# Patient Record
Sex: FEMALE | Race: WHITE | NOT HISPANIC OR LATINO | Employment: OTHER | URBAN - METROPOLITAN AREA
[De-identification: names, ages, dates, MRNs, and addresses within clinical notes are randomized per-mention and may not be internally consistent; named-entity substitution may affect disease eponyms.]

---

## 2017-08-07 ENCOUNTER — HOSPITAL ENCOUNTER (EMERGENCY)
Facility: HOSPITAL | Age: 82
Discharge: HOME | End: 2017-08-07
Attending: EMERGENCY MEDICINE | Admitting: EMERGENCY MEDICINE

## 2017-08-07 ENCOUNTER — APPOINTMENT (OUTPATIENT)
Dept: GENERAL RADIOLOGY | Facility: HOSPITAL | Age: 82
End: 2017-08-07

## 2017-08-07 ENCOUNTER — APPOINTMENT (OUTPATIENT)
Dept: CARDIOLOGY | Facility: HOSPITAL | Age: 82
End: 2017-08-07
Attending: EMERGENCY MEDICINE

## 2017-08-07 VITALS
RESPIRATION RATE: 16 BRPM | DIASTOLIC BLOOD PRESSURE: 74 MMHG | TEMPERATURE: 98.7 F | WEIGHT: 115 LBS | OXYGEN SATURATION: 95 % | SYSTOLIC BLOOD PRESSURE: 179 MMHG | BODY MASS INDEX: 21.71 KG/M2 | HEIGHT: 61 IN | HEART RATE: 44 BPM

## 2017-08-07 DIAGNOSIS — G89.29 CHRONIC PAIN OF BOTH KNEES: ICD-10-CM

## 2017-08-07 DIAGNOSIS — I49.1 PAC (PREMATURE ATRIAL CONTRACTION): ICD-10-CM

## 2017-08-07 DIAGNOSIS — M25.562 CHRONIC PAIN OF BOTH KNEES: ICD-10-CM

## 2017-08-07 DIAGNOSIS — R00.2 PALPITATIONS: Primary | ICD-10-CM

## 2017-08-07 DIAGNOSIS — M25.561 CHRONIC PAIN OF BOTH KNEES: ICD-10-CM

## 2017-08-07 DIAGNOSIS — I10 ESSENTIAL HYPERTENSION: ICD-10-CM

## 2017-08-07 LAB
ALBUMIN SERPL-MCNC: 4 G/DL (ref 3.5–5.2)
ALBUMIN/GLOB SERPL: 1.1 G/DL
ALP SERPL-CCNC: 41 U/L (ref 39–117)
ALT SERPL W P-5'-P-CCNC: 15 U/L (ref 1–33)
ANION GAP SERPL CALCULATED.3IONS-SCNC: 15.6 MMOL/L
AST SERPL-CCNC: 19 U/L (ref 1–32)
BASOPHILS # BLD AUTO: 0.05 10*3/MM3 (ref 0–0.2)
BASOPHILS NFR BLD AUTO: 0.5 % (ref 0–1.5)
BILIRUB SERPL-MCNC: 0.4 MG/DL (ref 0.1–1.2)
BUN BLD-MCNC: 15 MG/DL (ref 8–23)
BUN/CREAT SERPL: 30.6 (ref 7–25)
CALCIUM SPEC-SCNC: 9.4 MG/DL (ref 8.6–10.5)
CHLORIDE SERPL-SCNC: 102 MMOL/L (ref 98–107)
CO2 SERPL-SCNC: 23.4 MMOL/L (ref 22–29)
CREAT BLD-MCNC: 0.49 MG/DL (ref 0.57–1)
DEPRECATED RDW RBC AUTO: 52.8 FL (ref 37–54)
EOSINOPHIL # BLD AUTO: 0.07 10*3/MM3 (ref 0–0.7)
EOSINOPHIL NFR BLD AUTO: 0.7 % (ref 0.3–6.2)
ERYTHROCYTE [DISTWIDTH] IN BLOOD BY AUTOMATED COUNT: 14.6 % (ref 11.7–13)
GFR SERPL CREATININE-BSD FRML MDRD: 120 ML/MIN/1.73
GLOBULIN UR ELPH-MCNC: 3.6 GM/DL
GLUCOSE BLD-MCNC: 115 MG/DL (ref 65–99)
HCT VFR BLD AUTO: 43.7 % (ref 35.6–45.5)
HGB BLD-MCNC: 14.3 G/DL (ref 11.9–15.5)
HOLD SPECIMEN: NORMAL
HOLD SPECIMEN: NORMAL
IMM GRANULOCYTES # BLD: 0.03 10*3/MM3 (ref 0–0.03)
IMM GRANULOCYTES NFR BLD: 0.3 % (ref 0–0.5)
LYMPHOCYTES # BLD AUTO: 1.01 10*3/MM3 (ref 0.9–4.8)
LYMPHOCYTES NFR BLD AUTO: 10.7 % (ref 19.6–45.3)
MCH RBC QN AUTO: 32.1 PG (ref 26.9–32)
MCHC RBC AUTO-ENTMCNC: 32.7 G/DL (ref 32.4–36.3)
MCV RBC AUTO: 98.2 FL (ref 80.5–98.2)
MONOCYTES # BLD AUTO: 0.66 10*3/MM3 (ref 0.2–1.2)
MONOCYTES NFR BLD AUTO: 7 % (ref 5–12)
NEUTROPHILS # BLD AUTO: 7.6 10*3/MM3 (ref 1.9–8.1)
NEUTROPHILS NFR BLD AUTO: 80.8 % (ref 42.7–76)
PLATELET # BLD AUTO: 229 10*3/MM3 (ref 140–500)
PMV BLD AUTO: 11.1 FL (ref 6–12)
POTASSIUM BLD-SCNC: 4 MMOL/L (ref 3.5–5.2)
PROT SERPL-MCNC: 7.6 G/DL (ref 6–8.5)
RBC # BLD AUTO: 4.45 10*6/MM3 (ref 3.9–5.2)
SODIUM BLD-SCNC: 141 MMOL/L (ref 136–145)
TROPONIN T SERPL-MCNC: <0.01 NG/ML (ref 0–0.03)
WBC NRBC COR # BLD: 9.42 10*3/MM3 (ref 4.5–10.7)
WHOLE BLOOD HOLD SPECIMEN: NORMAL
WHOLE BLOOD HOLD SPECIMEN: NORMAL

## 2017-08-07 PROCEDURE — 80053 COMPREHEN METABOLIC PANEL: CPT

## 2017-08-07 PROCEDURE — 36415 COLL VENOUS BLD VENIPUNCTURE: CPT

## 2017-08-07 PROCEDURE — 0296T HC EXT ECG > 48HR TO 21 DAY RCRD W/CONECT INTL RCRD: CPT

## 2017-08-07 PROCEDURE — 93005 ELECTROCARDIOGRAM TRACING: CPT

## 2017-08-07 PROCEDURE — 71020 HC CHEST PA AND LATERAL: CPT

## 2017-08-07 PROCEDURE — 85025 COMPLETE CBC W/AUTO DIFF WBC: CPT

## 2017-08-07 PROCEDURE — 93005 ELECTROCARDIOGRAM TRACING: CPT | Performed by: EMERGENCY MEDICINE

## 2017-08-07 PROCEDURE — 99284 EMERGENCY DEPT VISIT MOD MDM: CPT

## 2017-08-07 PROCEDURE — 93010 ELECTROCARDIOGRAM REPORT: CPT | Performed by: INTERNAL MEDICINE

## 2017-08-07 PROCEDURE — 84484 ASSAY OF TROPONIN QUANT: CPT

## 2017-08-07 RX ORDER — LOSARTAN POTASSIUM 50 MG/1
50 TABLET ORAL 2 TIMES DAILY
Status: ON HOLD | COMMUNITY
End: 2020-01-01

## 2017-08-07 RX ORDER — ASPIRIN 325 MG
325 TABLET ORAL ONCE
Status: DISCONTINUED | OUTPATIENT
Start: 2017-08-07 | End: 2017-08-07 | Stop reason: HOSPADM

## 2017-08-07 RX ORDER — SODIUM CHLORIDE 0.9 % (FLUSH) 0.9 %
10 SYRINGE (ML) INJECTION AS NEEDED
Status: DISCONTINUED | OUTPATIENT
Start: 2017-08-07 | End: 2017-08-07 | Stop reason: HOSPADM

## 2017-08-07 RX ORDER — CLONIDINE HYDROCHLORIDE 0.1 MG/1
0.2 TABLET ORAL ONCE
Status: COMPLETED | OUTPATIENT
Start: 2017-08-07 | End: 2017-08-07

## 2017-08-07 RX ADMIN — CLONIDINE HYDROCHLORIDE 0.2 MG: 0.1 TABLET ORAL at 20:55

## 2017-08-07 NOTE — ED PROVIDER NOTES
EMERGENCY DEPARTMENT ENCOUNTER    CHIEF COMPLAINT  Chief Complaint: palpitations   History given by: patient   History limited by: nothing   Room Number: 22/22  PMD: Arian Moyer MD    HPI:  Pt is a 84 y.o. female who presents to be evaluated for A-fib. Pt saw her rheumatologist today because she has had bilateral knee pain for 1 week. Her rheumatologist thought pt was in A-fib when she listened to her heart, so she recommended further evaluation. Pt has complained of fatigue and intermittent palpitations for 1 year. Pt denies SOA and any other sx. Pt sees a cardiologist for palpitations, but she has never been diagnosed with A-fib.     Duration:  Intermittent palpitations for 1 year   Onset: abnormal rhythm heard earlier today  Timing: intermittent   Location: heart, legs   Radiation: does not radiate   Intensity/Severity: moderate   Progression: unable to specify  Associated Symptoms: knee pain, fatigue   Aggravating Factors: none specified   Alleviating Factors: none specified   Previous Episodes: Pt states she has had episodes for 1 year but has never been diagnosed   Treatment before arrival: none     PAST MEDICAL HISTORY  Active Ambulatory Problems     Diagnosis Date Noted   • No Active Ambulatory Problems     Resolved Ambulatory Problems     Diagnosis Date Noted   • No Resolved Ambulatory Problems     Past Medical History:   Diagnosis Date   • Arthritis    • Disease of thyroid gland    • Hypertension    • Osteoporosis        PAST SURGICAL HISTORY  Past Surgical History:   Procedure Laterality Date   • COLONOSCOPY         FAMILY HISTORY  History reviewed. No pertinent family history.    SOCIAL HISTORY  Social History     Social History   • Marital status:      Spouse name: N/A   • Number of children: N/A   • Years of education: N/A     Occupational History   • Not on file.     Social History Main Topics   • Smoking status: Never Smoker   • Smokeless tobacco: Not on file   • Alcohol use No    • Drug use: No   • Sexual activity: Not on file     Other Topics Concern   • Not on file     Social History Narrative   • No narrative on file       ALLERGIES  Marigold  [calendula officinalis (marigold)]; Sulfa antibiotics; and Sulfites    REVIEW OF SYSTEMS  Review of Systems   Constitutional: Positive for fatigue. Negative for fever.   HENT: Negative for sore throat.    Eyes: Negative.    Respiratory: Negative for cough and shortness of breath.    Cardiovascular: Positive for palpitations. Negative for chest pain.   Gastrointestinal: Negative for abdominal pain, diarrhea and vomiting.   Genitourinary: Negative for dysuria.   Musculoskeletal: Negative for neck pain.        Bilateral knee pain   Skin: Negative for rash.   Allergic/Immunologic: Negative.    Neurological: Negative for weakness, numbness and headaches.   Hematological: Negative.    Psychiatric/Behavioral: Negative.    All other systems reviewed and are negative.      PHYSICAL EXAM  ED Triage Vitals   Temp Heart Rate Resp BP SpO2   08/07/17 1721 08/07/17 1721 08/07/17 1721 08/07/17 1730 08/07/17 1721   98.7 °F (37.1 °C) 78 18 168/99 96 %      Temp src Heart Rate Source Patient Position BP Location FiO2 (%)   08/07/17 1721 08/07/17 1721 08/07/17 1730 -- --   Tympanic Monitor Sitting         Physical Exam   Constitutional: She is oriented to person, place, and time and well-developed, well-nourished, and in no distress. No distress.   HENT:   Head: Normocephalic and atraumatic.   Eyes: EOM are normal. Pupils are equal, round, and reactive to light.   Neck: Normal range of motion. Neck supple.   Cardiovascular: Normal rate, regular rhythm and normal heart sounds.    Pulmonary/Chest: Effort normal and breath sounds normal. No respiratory distress.   Abdominal: Soft. There is no tenderness. There is no rebound and no guarding.   Musculoskeletal: Normal range of motion. She exhibits no edema.        Right knee: She exhibits no erythema. Tenderness (mild)  found.        Left knee: She exhibits no erythema. Tenderness (mild) found.   Neurological: She is alert and oriented to person, place, and time. She has normal sensation and normal strength.   Skin: Skin is warm and dry. No rash noted.   Psychiatric: Mood and affect normal.   Nursing note and vitals reviewed.      LAB RESULTS  Lab Results (last 24 hours)     Procedure Component Value Units Date/Time    CBC & Differential [[id number]] Collected:  08/07/17 1748    Specimen:  Blood Updated:  08/07/17 1819    Narrative:       The following orders were created for panel order CBC & Differential.  Procedure                               Abnormality         Status                     ---------                               -----------         ------                     CBC Auto Differential[[id number]]        Abnormal            Final result                 Please view results for these tests on the individual orders.    Comprehensive Metabolic Panel [[id number]]  (Abnormal) Collected:  08/07/17 1748    Specimen:  Blood Updated:  08/07/17 1853     Glucose 115 (H) mg/dL      BUN 15 mg/dL      Creatinine 0.49 (L) mg/dL      Sodium 141 mmol/L      Potassium 4.0 mmol/L      Chloride 102 mmol/L      CO2 23.4 mmol/L      Calcium 9.4 mg/dL      Total Protein 7.6 g/dL      Albumin 4.00 g/dL      ALT (SGPT) 15 U/L      AST (SGOT) 19 U/L      Alkaline Phosphatase 41 U/L      Total Bilirubin 0.4 mg/dL      eGFR Non African Amer 120 mL/min/1.73      Globulin 3.6 gm/dL      A/G Ratio 1.1 g/dL      BUN/Creatinine Ratio 30.6 (H)     Anion Gap 15.6 mmol/L     Narrative:       The MDRD GFR formula is only valid for adults with stable renal function between ages 18 and 70.    Troponin [[id number]]  (Normal) Collected:  08/07/17 1748    Specimen:  Blood Updated:  08/07/17 1853     Troponin T <0.010 ng/mL     Narrative:       Troponin T Reference Ranges:  Less than 0.03 ng/mL:    Negative for AMI  0.03 to 0.09 ng/mL:      Indeterminant for  AMI  Greater than 0.09 ng/mL: Positive for AMI    CBC Auto Differential [[id number]]  (Abnormal) Collected:  08/07/17 1748    Specimen:  Blood Updated:  08/07/17 1819     WBC 9.42 10*3/mm3      RBC 4.45 10*6/mm3      Hemoglobin 14.3 g/dL      Hematocrit 43.7 %      MCV 98.2 fL      MCH 32.1 (H) pg      MCHC 32.7 g/dL      RDW 14.6 (H) %      RDW-SD 52.8 fl      MPV 11.1 fL      Platelets 229 10*3/mm3      Neutrophil % 80.8 (H) %      Lymphocyte % 10.7 (L) %      Monocyte % 7.0 %      Eosinophil % 0.7 %      Basophil % 0.5 %      Immature Grans % 0.3 %      Neutrophils, Absolute 7.60 10*3/mm3      Lymphocytes, Absolute 1.01 10*3/mm3      Monocytes, Absolute 0.66 10*3/mm3      Eosinophils, Absolute 0.07 10*3/mm3      Basophils, Absolute 0.05 10*3/mm3      Immature Grans, Absolute 0.03 10*3/mm3           I ordered the above labs and reviewed the results    RADIOLOGY  XR Chest 2 View   Final Result       CXR:  1. Stable negative acute chest.  2. Scoliosis, spondylosis, mild chronic pulmonary change.      I ordered the above noted radiological studies. Interpreted by radiologist.  Reviewed by me in PACS.       PROCEDURES  Procedures  EKG           EKG time: 1727  Rhythm/Rate: SR at 63 with PACs  P waves and MT: normal  QRS, axis: LAD, LBBB, LVH, anterior Q waves   ST and T waves: nonspecific T wave changes      Interpreted Contemporaneously by me, independently viewed  Unchanged compared to prior 03/11/15    EKG           EKG time: 1934  Rhythm/Rate: Sinus bradycardia at 56 with frequent PAC  P waves and MT: normal  QRS, axis: LAD, LBBB, LVH   ST and T waves: nonspecific T wave changes      Interpreted Contemporaneously by me, independently viewed  Rate slightly slower compared to prior        PROGRESS AND CONSULTS  ED Course     1728: CXR, EKG, and labs were ordered in triage.     1920: Ordered repeat EKG to evaluate for A-fib.     1947: Ordered ZIO patch placement.     2036: Rechecked pt. Pt is resting comfortably and  ZIO patch has been placed. I had ordered Clonidine here, but she refuses because she had not taken her afternoon dose of Losartan or Amlodipine. She plans to take these when she goes home. Discussed unremarkable workup and explained that pt has not been in A-fib while in ED. Pt will f/u with cardiologist regarding ZIO patch results. Pt understands and agrees with plan for discharge and all questions were addressed.     MEDICAL DECISION MAKING  Results were reviewed/discussed with the patient and they were also made aware of online access. Pt also made aware that some labs, such as cultures, will not be resulted during ER visit and follow up with PMD is necessary.     MDM  Number of Diagnoses or Management Options  Chronic pain of both knees:   Essential hypertension:   PAC (premature atrial contraction):   Palpitations:   Diagnosis management comments: Patient was sent to the ER for evaluation for possible atrial fibrillation.  The patient did complain of palpitations but denied dizziness, chest pain, or shortness of breath.  Serial EKGs done in the ER showed sinus rhythm with frequent PACs.  Her labs were unremarkable.  A ZIO patch was placed in the ER.  The patient was advised to follow-up with her cardiologist.       Amount and/or Complexity of Data Reviewed  Clinical lab tests: reviewed and ordered  Tests in the radiology section of CPT®: reviewed and ordered  Tests in the medicine section of CPT®: reviewed and ordered (EKG time: 1727  Rhythm/Rate: SR at 63 with PACs  P waves and DE: normal  QRS, axis: LAD, LBBB, LVH, anterior Q waves   ST and T waves: nonspecific T wave changes      Interpreted Contemporaneously by me, independently viewed  Unchanged compared to prior 03/11/15)  Decide to obtain previous medical records or to obtain history from someone other than the patient: yes  Review and summarize past medical records: yes (Pt was last admitted here in March 2015 for nausea, vomiting, and possible  medication withdrawal. )    Patient Progress  Patient progress: stable         DIAGNOSIS  Final diagnoses:   Palpitations   Essential hypertension   Chronic pain of both knees   PAC (premature atrial contraction)       DISPOSITION  DISCHARGE    Patient discharged in stable condition.    Reviewed implications of results, diagnosis, meds, responsibility to follow up, warning signs and symptoms of possible worsening, potential complications and reasons to return to ER.    Patient/Family voiced understanding of above instructions.    Discussed plan for discharge, as there is no emergent indication for admission.  Pt/family is agreeable and understands need for follow up and repeat testing.  Pt is aware that discharge does not mean that nothing is wrong but it indicates no emergency is present that requires admission and they must continue care with follow-up as given below or physician of their choice.     FOLLOW-UP  Karri Rodriguez MD  [address] [address]  [phone]    Call in 1 week           Medication List      Notice     No changes were made to your prescriptions during this visit.        Latest Documented Vital Signs:  As of 8:43 PM  BP- (!) 196/110 HR- 65 Temp- 98.7 °F (37.1 °C) (Tympanic) O2 sat- 94%    --  Documentation assistance provided by maia Holland for Gerson Hensley.  Information recorded by the scribe was done at my direction and has been verified and validated by me.            Radha Holland  08/07/17 8110       Chavez Hensley MD  08/07/17 0068

## 2017-08-07 NOTE — ED NOTES
"MD Hensley at bedside for eval. Pt reports she was seen by rheumatologist today today for RA flare up and was told to come to ER because when she listened to pt, she \"heard Afib.\" pt did not have EKG done at office. Pt reports palpitations recently and has been following with cardiologist who did not dx Afib. Pt denies chest pain or palpitations at this time. Reassurance given; call light in reach. Pts breathing even and unlabored. Pt appears in NAD at this time. Family at bedside.        Shana Barney, RN  08/07/17 1914    "

## 2017-08-20 PROCEDURE — 0298T ZIO PATCH: CPT | Performed by: INTERNAL MEDICINE

## 2017-12-08 ENCOUNTER — TRANSCRIBE ORDERS (OUTPATIENT)
Dept: PHYSICAL THERAPY | Facility: HOSPITAL | Age: 82
End: 2017-12-08

## 2017-12-08 DIAGNOSIS — M41.9 SCOLIOSIS, UNSPECIFIED SCOLIOSIS TYPE, UNSPECIFIED SPINAL REGION: Primary | ICD-10-CM

## 2017-12-27 ENCOUNTER — APPOINTMENT (OUTPATIENT)
Dept: PHYSICAL THERAPY | Facility: HOSPITAL | Age: 82
End: 2017-12-27

## 2018-01-02 ENCOUNTER — APPOINTMENT (OUTPATIENT)
Dept: PHYSICAL THERAPY | Facility: HOSPITAL | Age: 83
End: 2018-01-02

## 2018-01-04 ENCOUNTER — APPOINTMENT (OUTPATIENT)
Dept: PHYSICAL THERAPY | Facility: HOSPITAL | Age: 83
End: 2018-01-04

## 2018-01-11 ENCOUNTER — APPOINTMENT (OUTPATIENT)
Dept: PHYSICAL THERAPY | Facility: HOSPITAL | Age: 83
End: 2018-01-11

## 2018-01-16 ENCOUNTER — APPOINTMENT (OUTPATIENT)
Dept: PHYSICAL THERAPY | Facility: HOSPITAL | Age: 83
End: 2018-01-16

## 2018-07-09 ENCOUNTER — HOSPITAL ENCOUNTER (EMERGENCY)
Facility: HOSPITAL | Age: 83
Discharge: HOME | End: 2018-07-09
Admitting: EMERGENCY MEDICINE

## 2018-07-09 VITALS
HEIGHT: 60 IN | DIASTOLIC BLOOD PRESSURE: 75 MMHG | BODY MASS INDEX: 21.01 KG/M2 | HEART RATE: 79 BPM | RESPIRATION RATE: 18 BRPM | SYSTOLIC BLOOD PRESSURE: 153 MMHG | WEIGHT: 107 LBS | OXYGEN SATURATION: 94 % | TEMPERATURE: 98.2 F

## 2018-07-09 DIAGNOSIS — K59.03 DRUG-INDUCED CONSTIPATION: Primary | ICD-10-CM

## 2018-07-09 PROCEDURE — 96374 THER/PROPH/DIAG INJ IV PUSH: CPT

## 2018-07-09 PROCEDURE — 99284 EMERGENCY DEPT VISIT MOD MDM: CPT

## 2018-07-09 PROCEDURE — 96375 TX/PRO/DX INJ NEW DRUG ADDON: CPT

## 2018-07-09 PROCEDURE — 25010000002 HYDROMORPHONE PER 4 MG: Performed by: EMERGENCY MEDICINE

## 2018-07-09 PROCEDURE — 25010000002 ONDANSETRON PER 1 MG: Performed by: EMERGENCY MEDICINE

## 2018-07-09 RX ORDER — OXYCODONE AND ACETAMINOPHEN 10; 325 MG/1; MG/1
2 TABLET ORAL EVERY 6 HOURS PRN
COMMUNITY
End: 2018-10-12

## 2018-07-09 RX ORDER — SENNOSIDES 8.6 MG
2 TABLET ORAL 2 TIMES DAILY
COMMUNITY

## 2018-07-09 RX ORDER — POLYETHYLENE GLYCOL 3350 17 G/17G
17 POWDER, FOR SOLUTION ORAL DAILY
Qty: 100 EACH | Refills: 0 | Status: SHIPPED | OUTPATIENT
Start: 2018-07-09

## 2018-07-09 RX ORDER — SODIUM CHLORIDE 0.9 % (FLUSH) 0.9 %
10 SYRINGE (ML) INJECTION AS NEEDED
Status: DISCONTINUED | OUTPATIENT
Start: 2018-07-09 | End: 2018-07-09 | Stop reason: HOSPADM

## 2018-07-09 RX ORDER — ONDANSETRON 2 MG/ML
4 INJECTION INTRAMUSCULAR; INTRAVENOUS ONCE
Status: COMPLETED | OUTPATIENT
Start: 2018-07-09 | End: 2018-07-09

## 2018-07-09 RX ORDER — HYDROMORPHONE HYDROCHLORIDE 1 MG/ML
0.5 INJECTION, SOLUTION INTRAMUSCULAR; INTRAVENOUS; SUBCUTANEOUS ONCE
Status: COMPLETED | OUTPATIENT
Start: 2018-07-09 | End: 2018-07-09

## 2018-07-09 RX ADMIN — HYDROMORPHONE HYDROCHLORIDE 0.5 MG: 1 INJECTION, SOLUTION INTRAMUSCULAR; INTRAVENOUS; SUBCUTANEOUS at 18:43

## 2018-07-09 RX ADMIN — ONDANSETRON 4 MG: 2 INJECTION, SOLUTION INTRAMUSCULAR; INTRAVENOUS at 18:42

## 2018-07-09 NOTE — ED PROVIDER NOTES
EMERGENCY DEPARTMENT ENCOUNTER    Room Number:  41/41  Date seen:  7/10/2018  Time seen: 6:16 PM  PCP: Arian Moyer MD  Historian: Constipation      HPI:  Chief complaint: Constipation  Context: Yeni Jimenez is a 85 y.o. female who presents to the ED c/o constipation and abdominal fullness.  Pt has a h/o chronic constipation secondary to narcotic usage.  She was evaluated at [facility] on 7/6/18 for the same. She denies any fever or vomiting.      They discharged her with magnesium citrate.  Pt states that she had 3 large bowel movements that night but none since.  She feels like there is large obstructing stool in her rectum.        MEDICAL RECORD REVIEW   Reviewed previous visit from [facility] on 7/6/18. S he had a CT scan that showed a large volume stool throughout the colon associated with distention of the rectum in a pattern suggesting rectal fecal impaction and possible stercoral colitis. No abscess or signs of perforation.      ALLERGIES  Marigold  [calendula officinalis (marigold)]; Sulfa antibiotics; and Sulfites    PAST MEDICAL HISTORY  Active Ambulatory Problems     Diagnosis Date Noted   • No Active Ambulatory Problems     Resolved Ambulatory Problems     Diagnosis Date Noted   • No Resolved Ambulatory Problems     Past Medical History:   Diagnosis Date   • Arthritis    • Disease of thyroid gland    • Hypertension    • Osteoporosis    • Palpitation        PAST SURGICAL HISTORY  Past Surgical History:   Procedure Laterality Date   • COLONOSCOPY     • SIGMOIDOSCOPY         FAMILY HISTORY  History reviewed. No pertinent family history.    SOCIAL HISTORY  Social History     Social History   • Marital status:      Spouse name: N/A   • Number of children: N/A   • Years of education: N/A     Occupational History   • Not on file.     Social History Main Topics   • Smoking status: Never Smoker   • Smokeless tobacco: Not on file   • Alcohol use No   • Drug use: No   • Sexual activity: Not on file      Other Topics Concern   • Not on file     Social History Narrative   • No narrative on file           REVIEW OF SYSTEMS  Review of Systems   Constitutional: Negative for chills and fever.   Respiratory: Negative for shortness of breath.    Cardiovascular: Negative for chest pain.   Gastrointestinal: Positive for abdominal distention and constipation. Negative for abdominal pain and vomiting.   Genitourinary: Negative.    Musculoskeletal: Negative.    Neurological: Negative.    All other systems reviewed and are negative.          PHYSICAL EXAM  ED Triage Vitals   Temp Heart Rate Resp BP SpO2   07/09/18 1651 07/09/18 1651 07/09/18 1730 07/09/18 1731 07/09/18 1651   98.2 °F (36.8 °C) 70 18 167/73 93 %      Temp src Heart Rate Source Patient Position BP Location FiO2 (%)   -- 07/09/18 1731 07/09/18 1731 07/09/18 1731 --    Monitor Sitting Right arm      Physical Exam   Constitutional: She is oriented to person, place, and time and well-developed, well-nourished, and in no distress.   Eyes: EOM are normal.   Neck: Normal range of motion. Neck supple.   Cardiovascular: Normal rate and regular rhythm.    Pulmonary/Chest: Effort normal and breath sounds normal. No respiratory distress.   Abdominal: She exhibits no distension. There is tenderness (mild diffuse).   Genitourinary:   Genitourinary Comments: Nontender exam, no gross blood.  Large hard stool in rectal vault.     Musculoskeletal: Normal range of motion.   Neurological: She is alert and oriented to person, place, and time.   Skin: Skin is warm and dry.   Psychiatric: Affect and judgment normal.         LAB RESULTS  No results found for this or any previous visit (from the past 24 hour(s)).    I ordered the above labs and reviewed the results      EDICATIONS GIVEN IN ER  Medications   HYDROmorphone (DILAUDID) injection 0.5 mg (0.5 mg Intravenous Given 7/9/18 1843)   ondansetron (ZOFRAN) injection 4 mg (4 mg Intravenous Given 7/9/18 1842)  "    PROCEDURES  Procedures  Rectal disimpaction done by myself with female chaperone present.  Pt tolerated well.  Several large pieces of stool removed.  Enema ordered afterwards.        COURSE & MEDICAL DECISION MAKING  Pertinent Labs and Imaging studies that were ordered and reviewed are noted above.  Results were reviewed/discussed with the patient and they were also made aware of online assess.  Pt also made aware that some labs, such as cultures, will not be resulted during ER visit and follow up with PMD is necessary.         PROGRESS AND CONSULTS    Progress Notes:    1950 Reviewed pt's history and workup with Dr. Leung (ER physician).  After a bedside evaluation, Dr. Leung agrees with the plan of care    2000 The patient's history, physical exam, and lab findings were discussed with the physician, who also performed a face to face history and physical exam.  I discussed all results and noted any abnormalities with patient.  Discussed absoute need to recheck abnormalities with their family physician.  I answered any of the patient's questions.  Discussed plan for discharge, as there is no emergent indication for admission.  Pt is agreeable and understands need for follow up and repeat testing.  Pt is aware that discharge does not mean that nothing is wrong but it indicates no emergency is present and they must continue care with their family physician.  Pt is discharged with instructions to follow up with primary care doctor to have their blood pressure rechecked.       Disposition vitals:  /75 (BP Location: Right arm, Patient Position: Sitting)   Pulse 79   Temp 98.2 °F (36.8 °C)   Resp 18   Ht 152.4 cm (60\")   Wt 48.5 kg (107 lb)   SpO2 94%   BMI 20.90 kg/m²         DIAGNOSIS  Final diagnoses:   Drug-induced constipation         DISPOSITION  Discharged      FOLLOW UP   Lico Stanley MD  [address] [facility] [address] [phone]    Schedule an appointment as soon " as possible for a visit       Arian Moyer MD  [address] [address]  [phone]      As needed          RX     Medication List      New Prescriptions    polyethylene glycol packet  Commonly known as:  MIRALAX  Take 17 g by mouth Daily.        Stop    guaiFENesin-codeine 100-10 MG/5ML syrup  Commonly known as:  ROMILAR-AC     HYDROcodone-acetaminophen 5-325 MG per tablet  Commonly known as:  NORCO     ipratropium 17 MCG/ACT inhaler  Commonly known as:  ATROVENT HFA     olmesartan-hydrochlorothiazide 40-25 MG per tablet  Commonly known as:  BENICAR HCT     raloxifene 60 MG tablet  Commonly known as:  EVGUY Rodriguez  07/10/18 5260

## 2018-07-10 NOTE — ED PROVIDER NOTES
Pt states that she has hx of chronic constipation due to chronic oxycodone use. She reports that it has worsened over the past 2 days. It has not been alleviated with miralax (has been using it twice a week) or magnesium citrate. She has also had abd fullness. She denies N/V/D, pain and difficulty with urination, fevers, and chills.     Limited physical exam: lungs are CTAB, heart is regular rate and rhythm without murmur, abd is soft and nontender without distension after enema, normal active bowel sounds after enema    GUY Stewart performed fecal disimpaction and then ordered enema. After pt received enema in ED, she had a large bowel movement which provided significant sx relief. Instructed pt to use miralax twice daily for a few days after which she should take it once daily and to continue taking magnesium citrate for help with constipation. Advised pt to follow up with PMD and general surgeon for recheck. RTER warnings given.       MD ATTESTATION NOTE    The HANK and I have discussed this patient's history, physical exam, and treatment plan.  I have reviewed the documentation and personally had a face to face interaction with the patient. I affirm the documentation and agree with the treatment and plan.  The attached note describes my personal findings.      Documentation assistance provided by maia Cowan for Dr Leung. Information recorded by the scribe was done at my direction and has been verified and validated by me.      Robina Cowan  07/09/18 2667       Rafy Leung MD  07/09/18 8355

## 2018-07-16 ENCOUNTER — TELEPHONE (OUTPATIENT)
Dept: GASTROENTEROLOGY | Facility: CLINIC | Age: 83
End: 2018-07-16

## 2018-07-16 NOTE — TELEPHONE ENCOUNTER
----- Message from Lacey Spence sent at 7/16/2018  9:50 AM EDT -----  Regarding: appt  Contact: [phone]  Pt is wife of Dr Pa Jimenez and friend of Nikki Hopkins (friends of Dr Townsend). Pt  is worried about his wife and would like to talk with Dr Townsend if possible. She will be a new pt and scheduled for 7/26/18. She was in the ER over the weekend and has a fecal impaction. Pt is also on waiting list. Thanks

## 2018-07-19 ENCOUNTER — OFFICE VISIT (OUTPATIENT)
Dept: GASTROENTEROLOGY | Facility: CLINIC | Age: 83
End: 2018-07-19

## 2018-07-19 VITALS
DIASTOLIC BLOOD PRESSURE: 80 MMHG | WEIGHT: 99 LBS | TEMPERATURE: 98 F | BODY MASS INDEX: 19.44 KG/M2 | HEIGHT: 60 IN | SYSTOLIC BLOOD PRESSURE: 116 MMHG

## 2018-07-19 DIAGNOSIS — K59.03 DRUG-INDUCED CONSTIPATION: Primary | ICD-10-CM

## 2018-07-19 PROCEDURE — 99204 OFFICE O/P NEW MOD 45 MIN: CPT | Performed by: INTERNAL MEDICINE

## 2018-07-19 RX ORDER — CIPROFLOXACIN 500 MG/1
TABLET, FILM COATED ORAL
COMMUNITY
End: 2018-10-12

## 2018-07-19 NOTE — PROGRESS NOTES
Chief Complaint   Patient presents with   • Fecal Impaction     Yeni Jimenez is a 85 y.o. female who presents with a New onset's fecal impaction and obstruction   HPI     An 85-year-old female with history of hypothyroidism, hypertension, osteoporosis and rheumatoid arthritis who presents after being seen times to emergency room for colonic obstruction.  Patient found with fecal impaction requiring manual disimpaction.  Patient reports significant fear of eating and is been only tolerating baby foods.  Patient with no nausea vomiting no melena or bright red blood per rectum.  Patient given a prescription for Movantik but was afraid to start pending our discussion here today.  Patient denies any weight loss no fever or chills no chest pain or palpitations no myalgias no dysuria polyuria.  Patient does have significant arthralgias in her hands and her feet and knees.  Patient also with significant scoliosis of lower spine.    Past Medical History:   Diagnosis Date   • Arthritis    • Disease of thyroid gland    • Hypertension    • Osteoporosis    • Palpitation    • Rheumatoid arthritis (CMS/HCC)        Current Outpatient Prescriptions:   •  ALPRAZolam (XANAX) 1 MG tablet, Take  by mouth., Disp: , Rfl:   •  amLODIPine (NORVASC) 5 MG tablet, Take 5 mg by mouth 2 (Two) Times a Day., Disp: , Rfl:   •  ascorbic acid (VITAMIN C) 1000 MG tablet, Take  by mouth., Disp: , Rfl:   •  Cholecalciferol (VITAMIN D-3) 1000 UNITS capsule, Take  by mouth., Disp: , Rfl:   •  ciprofloxacin (CIPRO) 500 MG tablet, ciprofloxacin 500 mg tablet, Disp: , Rfl:   •  eszopiclone (LUNESTA) 2 MG tablet, Take  by mouth., Disp: , Rfl:   •  lansoprazole (PREVACID) 30 MG capsule, Take  by mouth., Disp: , Rfl:   •  leflunomide (ARAVA) 20 MG tablet, Take  by mouth., Disp: , Rfl:   •  levothyroxine (SYNTHROID, LEVOTHROID) 100 MCG tablet, Take  by mouth., Disp: , Rfl:   •  losartan (COZAAR) 50 MG tablet, Take 50 mg by mouth 2 (Two) Times a Day., Disp: ,  Rfl:   •  Magnesium Salicylate 500 MG tablet, Take  by mouth., Disp: , Rfl:   •  metoprolol succinate XL (TOPROL-XL) 25 MG 24 hr tablet, Take  by mouth., Disp: , Rfl:   •  Multiple Minerals-Vitamins (CALCIUM CITRATE +) tablet, Take  by mouth., Disp: , Rfl:   •  Multiple Vitamin (MULTI VITAMIN DAILY PO), Take  by mouth., Disp: , Rfl:   •  oxyCODONE-acetaminophen (PERCOCET)  MG per tablet, Take 2 tablets by mouth Every 6 (Six) Hours As Needed for Moderate Pain ., Disp: , Rfl:   •  PB-Hyoscy-Atropine-Scopolamine (DONNATAL) 16.2 MG per tablet, Take  by mouth., Disp: , Rfl:   •  polyethylene glycol (MIRALAX) packet, Take 17 g by mouth Daily., Disp: 100 each, Rfl: 0  •  predniSONE (DELTASONE) 1 MG tablet, Take 10 mg by mouth Daily., Disp: , Rfl:   •  senna (SENOKOT) 8.6 MG tablet tablet, Take 2 tablets by mouth Every Night., Disp: , Rfl:   •  Ubiquinol 100 MG capsule, Take  by mouth., Disp: , Rfl:   •  albuterol (PROVENTIL HFA;VENTOLIN HFA) 108 (90 BASE) MCG/ACT inhaler, ProAir  (90 Base) MCG/ACT Inhalation Aerosol Solution; Patient Sig: ProAir  (90 Base) MCG/ACT Inhalation Aerosol Solution ; 85; 0; 17-Jun-2014; Active, Disp: , Rfl:   •  albuterol (PROVENTIL HFA;VENTOLIN HFA) 108 (90 BASE) MCG/ACT inhaler, Inhale 2 puffs every 4 (four) hours as needed for wheezing., Disp: 1 inhaler, Rfl: 2  •  cycloSPORINE (RESTASIS) 0.05 % ophthalmic emulsion, Apply  to eye., Disp: , Rfl:   •  Naloxegol Oxalate (MOVANTIK) 25 MG tablet, Take 25 mg by mouth Daily., Disp: 30 tablet, Rfl: 0  Allergies   Allergen Reactions   • Marigold  [Calendula Officinalis (Marigold)]    • Sulfa Antibiotics    • Sulfites      Social History     Social History   • Marital status:      Spouse name: N/A   • Number of children: N/A   • Years of education: N/A     Occupational History   • Not on file.     Social History Main Topics   • Smoking status: Never Smoker   • Smokeless tobacco: Not on file   • Alcohol use No   • Drug use: No    • Sexual activity: Not on file     Other Topics Concern   • Not on file     Social History Narrative   • No narrative on file     Family History   Problem Relation Age of Onset   • Stomach cancer Paternal Grandmother      Review of Systems   Constitutional: Negative.    HENT: Negative.    Eyes: Negative.    Respiratory: Negative.    Cardiovascular: Negative.    Gastrointestinal: Positive for abdominal pain and constipation. Negative for abdominal distention, anal bleeding, blood in stool, diarrhea, nausea, rectal pain and vomiting.   Endocrine: Negative.    Musculoskeletal: Negative.    Skin: Negative.    Allergic/Immunologic: Negative.    Hematological: Negative.      Vitals:    07/19/18 1518   BP: 116/80   Temp: 98 °F (36.7 °C)     Physical Exam   Constitutional: She is oriented to person, place, and time. She appears well-developed and well-nourished.   HENT:   Head: Normocephalic and atraumatic.   Eyes: Pupils are equal, round, and reactive to light. No scleral icterus.   Cardiovascular: Normal rate, regular rhythm and normal heart sounds.  Exam reveals no gallop and no friction rub.    No murmur heard.  Pulmonary/Chest: Effort normal and breath sounds normal. She has no wheezes. She has no rales.   Abdominal: Soft. Bowel sounds are normal. She exhibits no shifting dullness, no distension, no pulsatile liver, no fluid wave, no abdominal bruit, no ascites, no pulsatile midline mass and no mass. There is no hepatosplenomegaly. There is no tenderness. There is no rigidity and no guarding. No hernia.   Musculoskeletal: Normal range of motion. She exhibits no edema.   Neurological: She is alert and oriented to person, place, and time. No cranial nerve deficit.   Skin: Skin is warm and dry. No rash noted.   Psychiatric: She has a normal mood and affect. Her behavior is normal. Thought content normal.   Nursing note and vitals reviewed.    Diagnoses and all orders for this visit:    Drug-induced constipation    Other  orders  -     ciprofloxacin (CIPRO) 500 MG tablet; ciprofloxacin 500 mg tablet  -     Magnesium Salicylate 500 MG tablet; Take  by mouth.  -     Naloxegol Oxalate (MOVANTIK) 25 MG tablet; Take 25 mg by mouth Daily.    Patient 85-year-old female with history of hypertension hyperlipidemia, osteoporosis and rheumatoid arthritis presenting with obstipation.  Patient status post fecal disimpaction in the ER due to narcotic use.  Patient unable to eat or drink due to the debilitating pain in the abdomen from the obstruction.  Patient reportedly given laxatives with no improvement.  Patient in the ER ×2 due to the fecal impaction.  Patient reports 2 admissions to the ER due to this pain.  At this point patient reports being given Movantik but new they were coming here versus a wanted to talk about it before filling the prescription.  Patient currently is afraid to eat and is only taking baby food.  This point would recommend high protein diet with low residue and slowly advance as the stools become normal.  Patient given several weeks supply of Movantik samples prior to filling the prescription.  Await physical response for further recommendations.

## 2018-07-26 ENCOUNTER — TELEPHONE (OUTPATIENT)
Dept: GASTROENTEROLOGY | Facility: CLINIC | Age: 83
End: 2018-07-26

## 2018-07-26 NOTE — TELEPHONE ENCOUNTER
----- Message from Sandy Tejada sent at 7/26/2018  1:19 PM EDT -----  Regarding: MEDICATION   Contact: [phone]  PT  MIGUEL SAID OUR OFFICE HAS TO CALL HER INSURANCE TO GET AN APPROVAL SO PT CAN GET THIS MEDICATION (MOVANTIK) 25 MG tablet.      [facility]   [phone]

## 2018-07-31 ENCOUNTER — PRIOR AUTHORIZATION (OUTPATIENT)
Dept: GASTROENTEROLOGY | Facility: CLINIC | Age: 83
End: 2018-07-31

## 2018-08-06 RX ORDER — LUBIPROSTONE 24 UG/1
24 CAPSULE ORAL 2 TIMES DAILY WITH MEALS
Qty: 60 CAPSULE | Refills: 3 | Status: SHIPPED | OUTPATIENT
Start: 2018-08-06 | End: 2019-08-19 | Stop reason: ALTCHOICE

## 2018-09-13 RX ORDER — NALOXEGOL OXALATE 25 MG/1
TABLET, FILM COATED ORAL
Qty: 30 TABLET | Refills: 5 | Status: SHIPPED | OUTPATIENT
Start: 2018-09-13 | End: 2019-04-05 | Stop reason: SDUPTHER

## 2018-09-24 ENCOUNTER — TRANSCRIBE ORDERS (OUTPATIENT)
Dept: PHYSICAL THERAPY | Facility: HOSPITAL | Age: 83
End: 2018-09-24

## 2018-09-24 DIAGNOSIS — M48.00 SPINAL STENOSIS, UNSPECIFIED SPINAL REGION: Primary | ICD-10-CM

## 2018-09-25 ENCOUNTER — APPOINTMENT (OUTPATIENT)
Dept: WOMENS IMAGING | Facility: HOSPITAL | Age: 83
End: 2018-09-25

## 2018-09-25 PROCEDURE — 77080 DXA BONE DENSITY AXIAL: CPT | Performed by: RADIOLOGY

## 2018-09-26 ENCOUNTER — APPOINTMENT (OUTPATIENT)
Dept: PHYSICAL THERAPY | Facility: HOSPITAL | Age: 83
End: 2018-09-26

## 2018-10-04 ENCOUNTER — HOSPITAL ENCOUNTER (OUTPATIENT)
Dept: PHYSICAL THERAPY | Facility: HOSPITAL | Age: 83
Setting detail: THERAPIES SERIES
Discharge: HOME | End: 2018-10-04
Attending: INTERNAL MEDICINE

## 2018-10-04 DIAGNOSIS — M54.9 SPINAL PAIN: ICD-10-CM

## 2018-10-04 DIAGNOSIS — R26.2 DIFFICULTY WALKING: Primary | ICD-10-CM

## 2018-10-04 DIAGNOSIS — R53.1 STRENGTH LOSS OF: ICD-10-CM

## 2018-10-04 PROCEDURE — G8979 MOBILITY GOAL STATUS: HCPCS | Performed by: PHYSICAL THERAPIST

## 2018-10-04 PROCEDURE — G8978 MOBILITY CURRENT STATUS: HCPCS | Performed by: PHYSICAL THERAPIST

## 2018-10-04 PROCEDURE — 97110 THERAPEUTIC EXERCISES: CPT | Performed by: PHYSICAL THERAPIST

## 2018-10-04 PROCEDURE — 97162 PT EVAL MOD COMPLEX 30 MIN: CPT | Performed by: PHYSICAL THERAPIST

## 2018-10-04 NOTE — THERAPY EVALUATION
Outpatient Physical Therapy Ortho Initial Evaluation  [facility]     Patient Name: Yeni Jimenez  : 1932  MRN: [id number]  Today's Date: 10/4/2018      Visit Date: 10/04/2018    There is no problem list on file for this patient.       Past Medical History:   Diagnosis Date   • Arthritis    • Disease of thyroid gland    • Hypertension    • Osteoporosis    • Palpitation    • Rheumatoid arthritis (CMS/HCC)         Past Surgical History:   Procedure Laterality Date   • COLON RESECTION WITH COLOSTOMY     • COLONOSCOPY     • COLOSTOMY CLOSURE     • SIGMOIDOSCOPY         Visit Dx:     ICD-10-CM ICD-9-CM   1. Difficulty walking R26.2 719.7   2. Strength loss of R53.1 780.79   3. Spinal pain M54.9 724.5             Patient History     Row Name 10/04/18 1500             History    Chief Complaint Difficulty Walking;Difficulty with daily activities;Numbness;Tinglings;Joint stiffness  -MP      Date Current Problem(s) Began 01/04/15  -MP      Brief Description of Current Complaint Yeni reports pain in the spine, especially the upper L cervical spine and R knee pain.  Pain peaks at 8/10 and she has pain at some level constantly.  Difficulties with ADLs currently walking, car transfers, sit to stand transfers are difficult and she is unable to do housework.  PMH: OA, RA, spinal stenosis, HTN controlled with medication, heart palpatations, cataract surgery on the R.     -MP      Patient/Caregiver Goals Relieve pain;Improve mobility;Improve strength;Know what to do to help the symptoms  -MP      Current Tobacco Use Zero  -MP      Smoking Status Never  -MP      Patient's Rating of General Health Good  -MP      Hand Dominance right-handed  -MP      Occupation/sports/leisure activities Retired educator.  She enjoys reading, socializing, she enjoys being with her husbanc  -MP         Pain     Is your sleep disturbed? Yes  -MP      Is medication used to assist with sleep? Yes  -MP      Total hours of sleep per night 8    Interuppted  -MP      What position do you sleep in? Right sidelying;Left sidelying  -MP         Fall Risk Assessment    Any falls in the past year: Yes  -MP      Number of falls reported in the last 12 months One  -MP      Factors that contributed to the fall: Other (comment)   Due to weakness after hospitalization  -MP      Does patient have a fear of falling Yes (comment)   She is fearful of falling for injury   -MP         Services    Are you currently receiving Home Health services No  -MP         Daily Activities    Primary Language English  -MP      Are you able to read Yes  -MP      Are you able to write Yes  -MP      How does patient learn best? Demonstration;Pictures/Video  -MP      Pt Participated in POC and Goals Yes  -MP         Safety    Are you being hurt, hit, or frightened by anyone at home or in your life? No  -MP      Are you being neglected by a caregiver No  -MP        User Key  (r) = Recorded By, (t) = Taken By, (c) = Cosigned By    Initials Name Provider Type    MP Ramon Gates, PT Physical Therapist                PT Ortho     Row Name 10/04/18 1500       Posture/Observations    Posture/Observations Comments In standing, posterior view of the spine, R shoulder, scapula and ilium is lower than the L.  There is a slight scoliosis in the spine with the lumbar spine having concavity on the R.  From a lateral view, she presents with forward head, increased thoracic kyphosis and decreased lumbar lordosis.    -MP       Quarter Clearing    Quarter Clearing Lower Quarter Clearing  -MP       DTR- Lower Quarter Clearing    Patellar tendon (L2-4) Bilateral:;1- Minimal response  -MP    Achilles tendon (S1-2) Bilateral:;1- Minimal response  -MP       Pathological Reflexes- Lower Quarter Clearing    Clonus Negative  -MP    Babinski Negative  -MP       Lumbar ROM Screen- Lower Quarter Clearing    Lumbar Flexion Impaired   Moderate loss  -MP    Lumbar Extension Impaired   Moderate to severe loss  -MP     Lumbar Lateral Flexion Impaired   B moderate loss  -MP       General ROM    GENERAL ROM COMMENTS B UEs were WFL, Hips B moderate decreases, B knee flexion/extension WNL and ankles were B WNL  -MP       MMT (Manual Muscle Testing)    General MMT Comments B UE 4-/5, Hips B 4/5, Knee flexion L 4/5, R 4-/5, Extension L 4/5, R 4-/5 B ankle 4+/5  -MP       Sensation    Sensation WNL? WNL   Perception to light touch B WNL in the extremities  -MP       Girth    Girth Measured? Right Lower Extremity;Left Lower Extremity  -MP       RLE Quick Girth (cm)    Mid patella 34.5 cm  -MP       LLE Quick Girth (cm)    Mid patella 34.1 cm  -MP       Gait/Stairs Assessment/Training    Comment (Gait/Stairs) Yeni ambulated 80 feet, level surface, using a standard cane in her L hand.  She had decreased stride length and had an unsteady and antlalgic gait pattern.  She required CGA-1 for safety.    -MP      User Key  (r) = Recorded By, (t) = Taken By, (c) = Cosigned By    Initials Name Provider Type    Ramon Pugh, PT Physical Therapist          Therapy Education  Education Details: Yeni's HEP was begun with hip marching, LAQs and ankle dorsiflexion in sitting.  Given: HEP, Symptoms/condition management  Program: New  How Provided: Written  Provided to: Patient  Level of Understanding: Teach back education performed           PT OP Goals     Row Name 10/04/18 1800          PT Short Term Goals    STG Date to Achieve 11/03/18  -MP     STG 1 Yeni begins using the NuStep to begin light repetitive motion of the extemities to improve ROM and endurance.  -MP     STG 1 Progress New  -MP     STG 2 Therapeutic exercises are progressed for postural improvement in hooklying.  -MP     STG 2 Progress New  -MP     STG 3 She is introduced to lumbar spine bracing for core strengthening/control in hooklying.  -MP     STG 3 Progress New  -MP        Long Term Goals    LTG Date to Achieve 01/02/19  -MP     LTG 1 Yeni is able to ambulate with an  assistive device for 1/12 of a mile, one lap around the wellness center.    -MP     LTG 1 Progress New  -MP     LTG 2 She is indpendent with a complete HEP and self care education.  -MP     LTG 2 Progress New  -MP     LTG 3 Modified Oswestry improves by 15%.  -MP     LTG 3 Progress New  -MP        Time Calculation    PT Goal Re-Cert Due Date 11/03/18  -MP       User Key  (r) = Recorded By, (t) = Taken By, (c) = Cosigned By    Initials Name Provider Type    Ramon Pugh, PT Physical Therapist                PT Assessment/Plan     Row Name 10/04/18 185 10/04/18 2384       PT Assessment    Functional Limitations  -- Impaired gait;Impaired locomotion;Limitations in community activities;Limitations in functional capacity and performance;Performance in leisure activities  -MP    Impairments  -- Range of motion;Pain;Muscle strength;Posture;Poor body mechanics;Locomotion;Endurance;Gait;Impaired muscle power;Joint mobility  -MP    Assessment Comments Due to co-morbidities of RA, OA, long term loss of fucntion with gradual decline and her spinal issues she presents as moderately difficult for rehab purposes.  -MP Ms. Jimenez presents with multiple issues contributing to pain and loss of function.  She has RA, OA, stenosis of the spine and has been noticing a loss of function over time which continues to evolve.    -MP    Please refer to paper survey for additional self-reported information Yes  -MP  --    Rehab Potential Good  -MP  --    Patient/caregiver participated in establishment of treatment plan and goals Yes  -MP  --    Patient would benefit from skilled therapy intervention Yes  -MP  --       PT Plan    PT Frequency --   1-2 x per week  -MP  --    Predicted Duration of Therapy Intervention (Therapy Eval) 8 weeks  -MP  --    Planned CPT's? PT EVAL MOD COMPLELITY: 97591;PT RE-EVAL: 18697;PT THER ACT EA 15 MIN: 24669;PT THER PROC EA 15 MIN: 78614;PT MANUAL THERAPY EA 15 MIN: 92292;PT AQUATIC THERAPY EA 15 MIN:  21576;PT SELF CARE/HOME MGMT/TRAIN EA 15: 84076;PT ELECTRICAL STIM UNATTEND: ;PT ULTRASOUND EA 15 MIN: 32211;PT NEUROMUSC RE-EDUCATION EA 15 MIN: 42231  -MP  --    PT Plan Comments Yeni is to be progressed with the NuStep and begin lumbar stabilaization exercises and postural exercises in hooklying.  -MP  --      User Key  (r) = Recorded By, (t) = Taken By, (c) = Cosigned By    Initials Name Provider Type    Ramon Pugh PT Physical Therapist                  Exercises     Row Name 10/04/18 1800             Total Minutes    43563 - PT Therapeutic Exercise Minutes 10  -MP         Exercise 1    Exercise Name 1 In sitting, hip marching x 10 B, LAQs x 10 B and ankle dorsiflexion B x 10  -MP        User Key  (r) = Recorded By, (t) = Taken By, (c) = Cosigned By    Initials Name Provider Type    Ramon Pugh, PT Physical Therapist           Outcome Measure Options: Modifed Owestry  Modified Oswestry  Modified Oswestry Score/Comments: 37/50, 74% disability      Time Calculation:     Therapy Suggested Charges     Code   Minutes Charges    47793 (CPT®) Hc Pt Neuromusc Re Education Ea 15 Min      90355 (CPT®) Hc Pt Ther Proc Ea 15 Min 10 1    84207 (CPT®) Hc Gait Training Ea 15 Min      81557 (CPT®) Hc Pt Therapeutic Act Ea 15 Min      22866 (CPT®) Hc Pt Manual Therapy Ea 15 Min      44179 (CPT®) Hc Pt Ther Massage- Per 15 Min      59702 (CPT®) Hc Pt Iontophoresis Ea 15 Min      93591 (CPT®) Hc Pt Elec Stim Ea-Per 15 Min      87258 (CPT®) Hc Pt Ultrasound Ea 15 Min      46964 (CPT®) Hc Pt Self Care/Mgmt/Train Ea 15 Min      66550 (CPT®) Hc Pt Prosthetic (S) Train Initial Encounter, Each 15 Min      58172 (CPT®) Hc Orthotic(S) Mgmt/Train Initial Encounter, Each 15min      97573 (CPT®) Hc Pt Aquatic Therapy Ea 15 Min      67047 (CPT®) Hc Pt Orthotic(S)/Prosthetic(S) Encounter, Each 15 Min       (CPT®) Hc Pt Electrical Stim Unattended      Total  10 1          Start Time: 1530  Stop Time: 1615  Time  Calculation (min): 45 min     Therapy Charges for Today     Code Description Service Date Service Provider Modifiers Qty    15804631881 HC PT THER PROC EA 15 MIN 10/4/2018 Ramon Gates, PT GP 1    02348657932 HC PT EVAL MOD COMPLEXITY 2 10/4/2018 Ramon Gates, PT GP 1    73156421555 HC PT MOBILITY CURRENT 10/4/2018 Ramon Gates, PT GP, CL 1    85067267599 HC PT MOBILITY PROJECTED 10/4/2018 Ramon Gates, PT GP, CK 1          PT G-Codes  Outcome Measure Options: Modifed Owestry  Modified Oswestry Score/Comments: 37/50, 74% disability  Functional Limitation: Mobility: Walking and moving around  Mobility: Walking and Moving Around Current Status (): At least 60 percent but less than 80 percent impaired, limited or restricted  Mobility: Walking and Moving Around Goal Status (): At least 40 percent but less than 60 percent impaired, limited or restricted         Ramon Gates PT  10/4/2018

## 2018-10-12 ENCOUNTER — OFFICE VISIT (OUTPATIENT)
Dept: CARDIOLOGY | Facility: CLINIC | Age: 83
End: 2018-10-12

## 2018-10-12 VITALS
SYSTOLIC BLOOD PRESSURE: 116 MMHG | HEART RATE: 65 BPM | DIASTOLIC BLOOD PRESSURE: 76 MMHG | HEIGHT: 60 IN | WEIGHT: 106 LBS | BODY MASS INDEX: 20.81 KG/M2

## 2018-10-12 DIAGNOSIS — R00.2 PALPITATIONS: Primary | ICD-10-CM

## 2018-10-12 DIAGNOSIS — I45.4 IVCD (INTRAVENTRICULAR CONDUCTION DEFECT): ICD-10-CM

## 2018-10-12 PROCEDURE — 99204 OFFICE O/P NEW MOD 45 MIN: CPT | Performed by: INTERNAL MEDICINE

## 2018-10-12 PROCEDURE — 93000 ELECTROCARDIOGRAM COMPLETE: CPT | Performed by: INTERNAL MEDICINE

## 2018-10-25 ENCOUNTER — APPOINTMENT (OUTPATIENT)
Dept: PHYSICAL THERAPY | Facility: HOSPITAL | Age: 83
End: 2018-10-25

## 2018-10-29 ENCOUNTER — HOSPITAL ENCOUNTER (OUTPATIENT)
Dept: PHYSICAL THERAPY | Facility: HOSPITAL | Age: 83
Setting detail: THERAPIES SERIES
Discharge: HOME | End: 2018-10-29

## 2018-10-29 DIAGNOSIS — R26.2 DIFFICULTY WALKING: Primary | ICD-10-CM

## 2018-10-29 DIAGNOSIS — M54.9 SPINAL PAIN: ICD-10-CM

## 2018-10-29 DIAGNOSIS — R53.1 STRENGTH LOSS OF: ICD-10-CM

## 2018-10-29 PROCEDURE — 97110 THERAPEUTIC EXERCISES: CPT | Performed by: PHYSICAL THERAPIST

## 2018-10-29 NOTE — THERAPY TREATMENT NOTE
Outpatient Physical Therapy Ortho Treatment Note  [facility]     Patient Name: Yeni Jimenez  : 1932  MRN: [id number]  Today's Date: 10/29/2018      Visit Date: 10/29/2018    Visit Dx:    ICD-10-CM ICD-9-CM   1. Difficulty walking R26.2 719.7   2. Strength loss of R53.1 780.79   3. Spinal pain M54.9 724.5       There is no problem list on file for this patient.       Past Medical History:   Diagnosis Date   • Anxiety    • Arthritis    • Chronic back pain    • Disease of thyroid gland    • Hypertension    • Hypothyroidism    • Left bundle branch block    • Osteoarthritis    • Osteoporosis    • Palpitation    • Rheumatoid arthritis (CMS/HCC)    • 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HCC)         Past Surgical History:   Procedure Laterality Date   • COLON RESECTION WITH COLOSTOMY     • COLONOSCOPY     • COLOSTOMY CLOSURE     • SIGMOIDOSCOPY     • TONSILLECTOMY                 PT Assessment/Plan     Row Name 10/29/18 1808          PT Assessment    Assessment Comments Yeni arrived late due to traffic issues and seemed hesitant to do much secondary to pain being an issue.  We were able to meet two STGs despite that.  -MP        PT Plan    PT Plan Comments Monitor the effects of today's treatment and progress as she is able.  -MP       User Key  (r) = Recorded By, (t) = Taken By, (c) = Cosigned By    Initials Name Provider Type    Ramon Pugh PT Physical Therapist                Exercises     Row Name 10/29/18 1800             Total Minutes    80123 - PT Therapeutic Exercise Minutes 25  -MP         Exercise 1    Exercise Name 1 Refer to land flow sheet  -MP      Additional Comments Began posterior pelvic tilts x 10 and the NuStep x 3 minutes with a workload of 3.  -MP        User Key  (r) = Recorded By, (t) = Taken By, (c) = Cosigned By    Initials Name Provider Type    Ramon Pugh PT Physical Therapist                  PT OP Goals     Row Name 10/29/18 1800          PT Short Term Goals    STG Date to  Achieve 11/03/18  -MP     STG 1 Yeni begins using the NuStep to begin light repetitive motion of the extemities to improve ROM and endurance.  -MP     STG 1 Progress Met  -MP     STG 2 Therapeutic exercises are progressed for postural improvement in hooklying.  -MP     STG 2 Progress Met  -MP     STG 3 She is introduced to lumbar spine bracing for core strengthening/control in hooklying.  -MP     STG 3 Progress Ongoing  -MP        Long Term Goals    LTG Date to Achieve 01/02/19  -MP     LTG 1 Yeni is able to ambulate with an assistive device for 1/12 of a mile, one lap around the wellness center.    -MP     LTG 1 Progress Ongoing  -MP     LTG 2 She is indpendent with a complete HEP and self care education.  -MP     LTG 2 Progress Ongoing  -MP     LTG 3 Modified Oswestry improves by 15%.  -MP     LTG 3 Progress Ongoing  -MP       User Key  (r) = Recorded By, (t) = Taken By, (c) = Cosigned By    Initials Name Provider Type    Ramon Pugh PT Physical Therapist          Therapy Education  Education Details: Posterior pelvic tilts were added to her HEP.  Given: HEP  Program: New  How Provided: Written  Provided to: Patient  Level of Understanding: Teach back education performed     Time Calculation:   Start Time: 1620  Stop Time: 1645  Time Calculation (min): 25 min  Therapy Suggested Charges     Code   Minutes Charges    24245 (CPT®) Hc Pt Neuromusc Re Education Ea 15 Min      88743 (CPT®) Hc Pt Ther Proc Ea 15 Min 25 2    97288 (CPT®) Hc Gait Training Ea 15 Min      93918 (CPT®) Hc Pt Therapeutic Act Ea 15 Min      21767 (CPT®) Hc Pt Manual Therapy Ea 15 Min      78257 (CPT®) Hc Pt Ther Massage- Per 15 Min      67945 (CPT®) Hc Pt Iontophoresis Ea 15 Min      85144 (CPT®) Hc Pt Elec Stim Ea-Per 15 Min      14135 (CPT®) Hc Pt Ultrasound Ea 15 Min      97011 (CPT®) Hc Pt Self Care/Mgmt/Train Ea 15 Min      69131 (CPT®) Hc Pt Prosthetic (S) Train Initial Encounter, Each 15 Min      58061 (CPT®) Hc Orthotic(S)  Mgmt/Train Initial Encounter, Each 15min      60655 (CPT®) Hc Pt Aquatic Therapy Ea 15 Min      20782 (CPT®) Hc Pt Orthotic(S)/Prosthetic(S) Encounter, Each 15 Min       (CPT®) Hc Pt Electrical Stim Unattended      Total  25 2        Therapy Charges for Today     Code Description Service Date Service Provider Modifiers Qty    95667086899 HC PT THER PROC EA 15 MIN 10/29/2018 Ramon Gates, PT GP 2            Ramon Gates, PT  10/29/2018

## 2018-11-01 ENCOUNTER — HOSPITAL ENCOUNTER (OUTPATIENT)
Dept: PHYSICAL THERAPY | Facility: HOSPITAL | Age: 83
Setting detail: THERAPIES SERIES
Discharge: HOME | End: 2018-11-01

## 2018-11-01 DIAGNOSIS — M54.9 SPINAL PAIN: ICD-10-CM

## 2018-11-01 DIAGNOSIS — R26.2 DIFFICULTY WALKING: Primary | ICD-10-CM

## 2018-11-01 DIAGNOSIS — R53.1 STRENGTH LOSS OF: ICD-10-CM

## 2018-11-01 PROCEDURE — G8980 MOBILITY D/C STATUS: HCPCS | Performed by: PHYSICAL THERAPIST

## 2018-11-01 PROCEDURE — 97110 THERAPEUTIC EXERCISES: CPT | Performed by: PHYSICAL THERAPIST

## 2018-11-01 PROCEDURE — G8979 MOBILITY GOAL STATUS: HCPCS | Performed by: PHYSICAL THERAPIST

## 2018-11-01 NOTE — THERAPY DISCHARGE NOTE
Outpatient Physical Therapy Ortho Treatment Note/Discharge Summary  [facility]     Patient Name: Yeni Jimenez  : 1932  MRN: [id number]  Today's Date: 2018      Visit Date: 2018    Visit Dx:    ICD-10-CM ICD-9-CM   1. Difficulty walking R26.2 719.7   2. Strength loss of R53.1 780.79   3. Spinal pain M54.9 724.5       There is no problem list on file for this patient.       Past Medical History:   Diagnosis Date   • Anxiety    • Arthritis    • Chronic back pain    • Disease of thyroid gland    • Hypertension    • Hypothyroidism    • Left bundle branch block    • Osteoarthritis    • Osteoporosis    • Palpitation    • Rheumatoid arthritis (CMS/HCC)    • 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HCC)         Past Surgical History:   Procedure Laterality Date   • COLON RESECTION WITH COLOSTOMY     • COLONOSCOPY     • COLOSTOMY CLOSURE     • SIGMOIDOSCOPY     • TONSILLECTOMY                 Exercises     Row Name 18 1700             Subjective Comments    Subjective Comments Yeni arrived 20 minutes late for treatment.  She reports that the act of getting ready and coming to therapy wears her out too, much and feels that it is not benefitting her currently.  She feels that she would benefit further from home health PT.  -MP         Total Minutes    35258 - PT Therapeutic Exercise Minutes 25  -MP         Exercise 1    Exercise Name 1 Refer to land flow sheet  -MP        User Key  (r) = Recorded By, (t) = Taken By, (c) = Cosigned By    Initials Name Provider Type    Ramon Pugh, PT Physical Therapist                PT OP Goals     Row Name 18 1700          PT Short Term Goals    STG Date to Achieve 18  -MP     STG 1 Yeni begins using the NuStep to begin light repetitive motion of the extemities to improve ROM and endurance.  -MP     STG 1 Progress Met  -MP     STG 2 Therapeutic exercises are progressed for postural improvement in hooklying.  -MP     STG 2 Progress Met  -MP     STG 3 She is  introduced to lumbar spine bracing for core strengthening/control in hooklying.  -MP     STG 3 Progress Not Met  -MP        Long Term Goals    LTG Date to Achieve 01/02/19  -MP     LTG 1 Yeni is able to ambulate with an assistive device for 1/12 of a mile, one lap around the wellness center.    -MP     LTG 1 Progress Not Met  -MP     LTG 2 She is indpendent with a complete HEP and self care education.  -MP     LTG 2 Progress Not Met  -MP     LTG 3 Modified Oswestry improves by 15%.  -MP     LTG 3 Progress Not Met  -MP       User Key  (r) = Recorded By, (t) = Taken By, (c) = Cosigned By    Initials Name Provider Type    Ramon Pugh, PT Physical Therapist          Therapy Education  Given: HEP, Symptoms/condition management  Program: Reinforced  How Provided: Verbal  Provided to: Patient  Level of Understanding: Verbalized    Outcome Measure Options: Modifed Paulinoestry  Modified Oswestry  Modified Oswestry Score/Comments: 37/50, no change from evaluation      Time Calculation:   Start Time: 1540  Stop Time: 1605  Time Calculation (min): 25 min  Therapy Suggested Charges     Code   Minutes Charges    72460 (CPT®) Hc Pt Neuromusc Re Education Ea 15 Min      23426 (CPT®) Hc Pt Ther Proc Ea 15 Min 25 2    96147 (CPT®) Hc Gait Training Ea 15 Min      39595 (CPT®) Hc Pt Therapeutic Act Ea 15 Min      84782 (CPT®) Hc Pt Manual Therapy Ea 15 Min      85886 (CPT®) Hc Pt Ther Massage- Per 15 Min      01058 (CPT®) Hc Pt Iontophoresis Ea 15 Min      13273 (CPT®) Hc Pt Elec Stim Ea-Per 15 Min      92986 (CPT®) Hc Pt Ultrasound Ea 15 Min      62097 (CPT®) Hc Pt Self Care/Mgmt/Train Ea 15 Min      46188 (CPT®) Hc Pt Prosthetic (S) Train Initial Encounter, Each 15 Min      16517 (CPT®) Hc Orthotic(S) Mgmt/Train Initial Encounter, Each 15min      12053 (CPT®) Hc Pt Aquatic Therapy Ea 15 Min      41441 (CPT®) Hc Pt Orthotic(S)/Prosthetic(S) Encounter, Each 15 Min       (CPT®) Hc Pt Electrical Stim Unattended      Total  25 2         Therapy Charges for Today     Code Description Service Date Service Provider Modifiers Qty    88580035744 HC PT THER PROC EA 15 MIN 11/1/2018 Ramon Gates, PT GP 2    29794295123 HC PT MOBILITY PROJECTED 11/1/2018 Ramon Gates, PT GP, CJ 1    35715133271 HC PT MOBILITY DISCHARGE 11/1/2018 Ramon Gates, PT GP, CL 1    74140974081 HC PT THER PROC EA 15 MIN 11/1/2018 Ramon Gates, PT GP 2          PT G-Codes  Outcome Measure Options: Modifed Owestry  Modified Oswestry Score/Comments: 37/50, no change from evaluation  Functional Limitation: Mobility: Walking and moving around  Mobility: Walking and Moving Around Goal Status (): At least 20 percent but less than 40 percent impaired, limited or restricted  Mobility: Walking and Moving Around Discharge Status (): At least 60 percent but less than 80 percent impaired, limited or restricted     OP PT Discharge Summary  Date of Discharge: 11/01/18  Reason for Discharge: Patient/Caregiver request  Outcomes Achieved: Unable to make functional progress toward goals at this time  Discharge Destination: Home health services  Discharge Instructions/Additional Comments: Due to her current functional status and the difficulty in getting here for outpatient skilled PT I feel she is a better candidate for home health PT.      Ramon Gates, PT  11/1/2018

## 2018-11-05 ENCOUNTER — APPOINTMENT (OUTPATIENT)
Dept: PHYSICAL THERAPY | Facility: HOSPITAL | Age: 83
End: 2018-11-05

## 2018-11-08 ENCOUNTER — APPOINTMENT (OUTPATIENT)
Dept: PHYSICAL THERAPY | Facility: HOSPITAL | Age: 83
End: 2018-11-08

## 2018-11-13 ENCOUNTER — APPOINTMENT (OUTPATIENT)
Dept: PHYSICAL THERAPY | Facility: HOSPITAL | Age: 83
End: 2018-11-13

## 2018-11-15 ENCOUNTER — APPOINTMENT (OUTPATIENT)
Dept: PHYSICAL THERAPY | Facility: HOSPITAL | Age: 83
End: 2018-11-15

## 2018-11-19 ENCOUNTER — APPOINTMENT (OUTPATIENT)
Dept: PHYSICAL THERAPY | Facility: HOSPITAL | Age: 83
End: 2018-11-19

## 2018-11-26 ENCOUNTER — APPOINTMENT (OUTPATIENT)
Dept: PHYSICAL THERAPY | Facility: HOSPITAL | Age: 83
End: 2018-11-26

## 2018-11-29 ENCOUNTER — APPOINTMENT (OUTPATIENT)
Dept: PHYSICAL THERAPY | Facility: HOSPITAL | Age: 83
End: 2018-11-29

## 2018-12-03 ENCOUNTER — APPOINTMENT (OUTPATIENT)
Dept: PHYSICAL THERAPY | Facility: HOSPITAL | Age: 83
End: 2018-12-03

## 2018-12-06 ENCOUNTER — APPOINTMENT (OUTPATIENT)
Dept: PHYSICAL THERAPY | Facility: HOSPITAL | Age: 83
End: 2018-12-06

## 2018-12-11 ENCOUNTER — APPOINTMENT (OUTPATIENT)
Dept: PHYSICAL THERAPY | Facility: HOSPITAL | Age: 83
End: 2018-12-11

## 2018-12-13 ENCOUNTER — APPOINTMENT (OUTPATIENT)
Dept: PHYSICAL THERAPY | Facility: HOSPITAL | Age: 83
End: 2018-12-13

## 2018-12-27 ENCOUNTER — DOCUMENTATION (OUTPATIENT)
Dept: PHYSICAL THERAPY | Facility: HOSPITAL | Age: 83
End: 2018-12-27

## 2018-12-27 NOTE — THERAPY DISCHARGE NOTE
Outpatient Physical Therapy Ortho Discharge       Patient Name: Yeni Jmienez  : 1932  MRN: [id number]  Today's Date: 2018      Visit Date: 2018    There is no problem list on file for this patient.       Past Medical History:   Diagnosis Date   • Anxiety    • Arthritis    • Chronic back pain    • Disease of thyroid gland    • Hypertension    • Hypothyroidism    • Left bundle branch block    • Osteoarthritis    • Osteoporosis    • Palpitation    • Rheumatoid arthritis (CMS/HCC)    • 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HCC)         Past Surgical History:   Procedure Laterality Date   • COLON RESECTION WITH COLOSTOMY     • COLONOSCOPY     • COLOSTOMY CLOSURE     • SIGMOIDOSCOPY     • TONSILLECTOMY           Visit Dx:   No diagnosis found.      PT OP Goals     Row Name 18 1300          PT Short Term Goals    STG Date to Achieve  18  -MP     STG 1  Yeni begins using the NuStep to begin light repetitive motion of the extemities to improve ROM and endurance.  -MP     STG 1 Progress  Met  -MP     STG 2  Therapeutic exercises are progressed for postural improvement in hooklying.  -MP     STG 2 Progress  Met  -MP     STG 3  She is introduced to lumbar spine bracing for core strengthening/control in hooklying.  -MP     STG 3 Progress  Not Met  -MP        Long Term Goals    LTG Date to Achieve  19  -MP     LTG 1  Yeni is able to ambulate with an assistive device for 1/12 of a mile, one lap around the wellness center.    -MP     LTG 1 Progress  Not Met  -MP     LTG 2  She is indpendent with a complete HEP and self care education.  -MP     LTG 2 Progress  Not Met  -MP     LTG 3  Modified Oswestry improves by 15%.  -MP     LTG 3 Progress  Not Met  -MP       User Key  (r) = Recorded By, (t) = Taken By, (c) = Cosigned By    Initials Name Provider Type    Ramon Pugh, PT Physical Therapist             Therapy Suggested Charges     Code   Minutes Charges    None                     OP PT  Discharge Summary  Date of Discharge: 12/27/18  Reason for Discharge: other (comment)(Stopped attending PT for unknow reasons)  Outcomes Achieved: Patient able to partially acheive established goals  Discharge Destination: Unknown  Discharge Instructions/Additional Comments: Ms. Jimenez attended skilled PT for 3 visits and then did not come back.          Ramon Gates, PT  12/27/2018

## 2019-04-09 RX ORDER — NALOXEGOL OXALATE 25 MG/1
TABLET, FILM COATED ORAL
Qty: 30 TABLET | Refills: 2 | Status: SHIPPED | OUTPATIENT
Start: 2019-04-09 | End: 2019-08-19

## 2019-08-19 ENCOUNTER — TELEPHONE (OUTPATIENT)
Dept: CARDIOLOGY | Facility: CLINIC | Age: 84
End: 2019-08-19

## 2019-08-19 ENCOUNTER — HOSPITAL ENCOUNTER (OUTPATIENT)
Facility: HOSPITAL | Age: 84
Setting detail: OBSERVATION
Discharge: HOME HEALTH | End: 2019-08-21
Attending: EMERGENCY MEDICINE | Admitting: HOSPITALIST

## 2019-08-19 DIAGNOSIS — I10 ESSENTIAL HYPERTENSION: ICD-10-CM

## 2019-08-19 DIAGNOSIS — E83.51 HYPOCALCEMIA: ICD-10-CM

## 2019-08-19 DIAGNOSIS — E87.6 HYPOKALEMIA: Primary | ICD-10-CM

## 2019-08-19 DIAGNOSIS — R53.1 GENERALIZED WEAKNESS: ICD-10-CM

## 2019-08-19 DIAGNOSIS — M05.79 RHEUMATOID ARTHRITIS INVOLVING MULTIPLE SITES WITH POSITIVE RHEUMATOID FACTOR (HCC): ICD-10-CM

## 2019-08-19 DIAGNOSIS — I10 HYPERTENSION, UNSPECIFIED TYPE: ICD-10-CM

## 2019-08-19 PROBLEM — E83.42 HYPOMAGNESEMIA: Status: ACTIVE | Noted: 2019-08-19

## 2019-08-19 PROBLEM — M06.9 RHEUMATOID ARTHRITIS (HCC): Status: ACTIVE | Noted: 2019-08-19

## 2019-08-19 LAB
25(OH)D3 SERPL-MCNC: 49.8 NG/ML (ref 30–100)
ALBUMIN SERPL-MCNC: 2.4 G/DL (ref 3.5–5.2)
ALBUMIN/GLOB SERPL: 1 G/DL
ALP SERPL-CCNC: 45 U/L (ref 39–117)
ALT SERPL W P-5'-P-CCNC: 25 U/L (ref 1–33)
ANION GAP SERPL CALCULATED.3IONS-SCNC: 6.7 MMOL/L (ref 5–15)
AST SERPL-CCNC: 32 U/L (ref 1–32)
BASOPHILS # BLD AUTO: 0.05 10*3/MM3 (ref 0–0.2)
BASOPHILS NFR BLD AUTO: 0.6 % (ref 0–1.5)
BILIRUB SERPL-MCNC: 0.2 MG/DL (ref 0.2–1.2)
BILIRUB UR QL STRIP: NEGATIVE
BUN BLD-MCNC: 12 MG/DL (ref 8–23)
BUN/CREAT SERPL: 41.4 (ref 7–25)
CA-I BLD-MCNC: 3.7 MG/DL (ref 4.6–5.4)
CA-I SERPL ISE-MCNC: 0.92 MMOL/L (ref 1.15–1.35)
CALCIUM SPEC-SCNC: 6.3 MG/DL (ref 8.6–10.5)
CHLORIDE SERPL-SCNC: 115 MMOL/L (ref 98–107)
CLARITY UR: CLEAR
CO2 SERPL-SCNC: 21.3 MMOL/L (ref 22–29)
COLOR UR: YELLOW
CREAT BLD-MCNC: 0.29 MG/DL (ref 0.57–1)
DEPRECATED RDW RBC AUTO: 51.4 FL (ref 37–54)
EOSINOPHIL # BLD AUTO: 0.12 10*3/MM3 (ref 0–0.4)
EOSINOPHIL NFR BLD AUTO: 1.4 % (ref 0.3–6.2)
ERYTHROCYTE [DISTWIDTH] IN BLOOD BY AUTOMATED COUNT: 14.2 % (ref 12.3–15.4)
GFR SERPL CREATININE-BSD FRML MDRD: >150 ML/MIN/1.73
GLOBULIN UR ELPH-MCNC: 2.3 GM/DL
GLUCOSE BLD-MCNC: 77 MG/DL (ref 65–99)
GLUCOSE UR STRIP-MCNC: NEGATIVE MG/DL
HCT VFR BLD AUTO: 42.2 % (ref 34–46.6)
HGB BLD-MCNC: 13.3 G/DL (ref 12–15.9)
HGB UR QL STRIP.AUTO: NEGATIVE
IMM GRANULOCYTES # BLD AUTO: 0.09 10*3/MM3 (ref 0–0.05)
IMM GRANULOCYTES NFR BLD AUTO: 1.1 % (ref 0–0.5)
KETONES UR QL STRIP: NEGATIVE
LEUKOCYTE ESTERASE UR QL STRIP.AUTO: NEGATIVE
LYMPHOCYTES # BLD AUTO: 0.74 10*3/MM3 (ref 0.7–3.1)
LYMPHOCYTES NFR BLD AUTO: 8.7 % (ref 19.6–45.3)
MAGNESIUM SERPL-MCNC: 1.5 MG/DL (ref 1.6–2.4)
MCH RBC QN AUTO: 30.6 PG (ref 26.6–33)
MCHC RBC AUTO-ENTMCNC: 31.5 G/DL (ref 31.5–35.7)
MCV RBC AUTO: 97.2 FL (ref 79–97)
MONOCYTES # BLD AUTO: 0.62 10*3/MM3 (ref 0.1–0.9)
MONOCYTES NFR BLD AUTO: 7.3 % (ref 5–12)
NEUTROPHILS # BLD AUTO: 6.85 10*3/MM3 (ref 1.7–7)
NEUTROPHILS NFR BLD AUTO: 80.9 % (ref 42.7–76)
NITRITE UR QL STRIP: NEGATIVE
NRBC BLD AUTO-RTO: 0 /100 WBC (ref 0–0.2)
PH UR STRIP.AUTO: 7.5 [PH] (ref 5–8)
PHOSPHATE SERPL-MCNC: 1.8 MG/DL (ref 2.5–4.5)
PLATELET # BLD AUTO: 206 10*3/MM3 (ref 140–450)
PMV BLD AUTO: 10.5 FL (ref 6–12)
POTASSIUM BLD-SCNC: 2.7 MMOL/L (ref 3.5–5.2)
PROT SERPL-MCNC: 4.7 G/DL (ref 6–8.5)
PROT UR QL STRIP: NEGATIVE
RBC # BLD AUTO: 4.34 10*6/MM3 (ref 3.77–5.28)
SODIUM BLD-SCNC: 143 MMOL/L (ref 136–145)
SP GR UR STRIP: 1.01 (ref 1–1.03)
TROPONIN T SERPL-MCNC: <0.01 NG/ML (ref 0–0.03)
UROBILINOGEN UR QL STRIP: NORMAL
WBC NRBC COR # BLD: 8.47 10*3/MM3 (ref 3.4–10.8)

## 2019-08-19 PROCEDURE — 96374 THER/PROPH/DIAG INJ IV PUSH: CPT

## 2019-08-19 PROCEDURE — 93005 ELECTROCARDIOGRAM TRACING: CPT | Performed by: EMERGENCY MEDICINE

## 2019-08-19 PROCEDURE — 82306 VITAMIN D 25 HYDROXY: CPT | Performed by: INTERNAL MEDICINE

## 2019-08-19 PROCEDURE — 63710000001 DIPHENHYDRAMINE PER 50 MG: Performed by: INTERNAL MEDICINE

## 2019-08-19 PROCEDURE — 84100 ASSAY OF PHOSPHORUS: CPT | Performed by: INTERNAL MEDICINE

## 2019-08-19 PROCEDURE — G0378 HOSPITAL OBSERVATION PER HR: HCPCS

## 2019-08-19 PROCEDURE — 81003 URINALYSIS AUTO W/O SCOPE: CPT | Performed by: EMERGENCY MEDICINE

## 2019-08-19 PROCEDURE — 83735 ASSAY OF MAGNESIUM: CPT | Performed by: INTERNAL MEDICINE

## 2019-08-19 PROCEDURE — 99285 EMERGENCY DEPT VISIT HI MDM: CPT

## 2019-08-19 PROCEDURE — 25810000003 SODIUM CHLORIDE 0.9 % WITH KCL 20 MEQ 20-0.9 MEQ/L-% SOLUTION: Performed by: INTERNAL MEDICINE

## 2019-08-19 PROCEDURE — 82330 ASSAY OF CALCIUM: CPT | Performed by: EMERGENCY MEDICINE

## 2019-08-19 PROCEDURE — 25010000002 CALCIUM GLUCONATE PER 10 ML: Performed by: INTERNAL MEDICINE

## 2019-08-19 PROCEDURE — 93010 ELECTROCARDIOGRAM REPORT: CPT | Performed by: INTERNAL MEDICINE

## 2019-08-19 PROCEDURE — 85025 COMPLETE CBC W/AUTO DIFF WBC: CPT | Performed by: EMERGENCY MEDICINE

## 2019-08-19 PROCEDURE — 84484 ASSAY OF TROPONIN QUANT: CPT | Performed by: EMERGENCY MEDICINE

## 2019-08-19 PROCEDURE — 96365 THER/PROPH/DIAG IV INF INIT: CPT

## 2019-08-19 PROCEDURE — 80053 COMPREHEN METABOLIC PANEL: CPT | Performed by: EMERGENCY MEDICINE

## 2019-08-19 RX ORDER — ASCORBIC ACID 500 MG
1000 TABLET ORAL DAILY
Status: DISCONTINUED | OUTPATIENT
Start: 2019-08-20 | End: 2019-08-21 | Stop reason: HOSPADM

## 2019-08-19 RX ORDER — LIDOCAINE 50 MG/G
1 PATCH TOPICAL DAILY PRN
Status: ON HOLD | COMMUNITY
End: 2020-01-01

## 2019-08-19 RX ORDER — LEVOTHYROXINE SODIUM 112 UG/1
112 TABLET ORAL DAILY
Status: ON HOLD | COMMUNITY
End: 2020-01-01

## 2019-08-19 RX ORDER — POTASSIUM CHLORIDE 1.5 G/1.77G
40 POWDER, FOR SOLUTION ORAL AS NEEDED
Status: DISCONTINUED | OUTPATIENT
Start: 2019-08-19 | End: 2019-08-21 | Stop reason: HOSPADM

## 2019-08-19 RX ORDER — PANTOPRAZOLE SODIUM 40 MG/1
40 TABLET, DELAYED RELEASE ORAL
Status: DISCONTINUED | OUTPATIENT
Start: 2019-08-19 | End: 2019-08-21 | Stop reason: HOSPADM

## 2019-08-19 RX ORDER — POTASSIUM CHLORIDE 750 MG/1
40 CAPSULE, EXTENDED RELEASE ORAL ONCE
Status: COMPLETED | OUTPATIENT
Start: 2019-08-19 | End: 2019-08-19

## 2019-08-19 RX ORDER — PHENOBARBITAL, HYOSCYAMINE SULFATE, ATROPINE SULFATE AND SCOPOLAMINE HYDROBROMIDE .0194; .1037; 16.2; .0065 MG/1; MG/1; MG/1; MG/1
1 TABLET ORAL EVERY 8 HOURS PRN
Status: DISCONTINUED | OUTPATIENT
Start: 2019-08-19 | End: 2019-08-21 | Stop reason: HOSPADM

## 2019-08-19 RX ORDER — IPRATROPIUM BROMIDE AND ALBUTEROL SULFATE 2.5; .5 MG/3ML; MG/3ML
3 SOLUTION RESPIRATORY (INHALATION) EVERY 4 HOURS PRN
Status: DISCONTINUED | OUTPATIENT
Start: 2019-08-19 | End: 2019-08-21 | Stop reason: HOSPADM

## 2019-08-19 RX ORDER — DIPHENHYDRAMINE HCL 25 MG
25 CAPSULE ORAL EVERY 6 HOURS PRN
COMMUNITY

## 2019-08-19 RX ORDER — CLONIDINE HYDROCHLORIDE 0.1 MG/1
0.1 TABLET ORAL ONCE
Status: COMPLETED | OUTPATIENT
Start: 2019-08-19 | End: 2019-08-19

## 2019-08-19 RX ORDER — ACETAMINOPHEN 650 MG/1
650 SUPPOSITORY RECTAL EVERY 4 HOURS PRN
Status: DISCONTINUED | OUTPATIENT
Start: 2019-08-19 | End: 2019-08-21 | Stop reason: HOSPADM

## 2019-08-19 RX ORDER — MAGNESIUM SULFATE HEPTAHYDRATE 40 MG/ML
2 INJECTION, SOLUTION INTRAVENOUS AS NEEDED
Status: DISCONTINUED | OUTPATIENT
Start: 2019-08-19 | End: 2019-08-21 | Stop reason: HOSPADM

## 2019-08-19 RX ORDER — SENNOSIDES 8.6 MG
2 TABLET ORAL 2 TIMES DAILY
Status: DISCONTINUED | OUTPATIENT
Start: 2019-08-19 | End: 2019-08-21 | Stop reason: HOSPADM

## 2019-08-19 RX ORDER — AMLODIPINE BESYLATE 5 MG/1
5 TABLET ORAL 2 TIMES DAILY
Status: DISCONTINUED | OUTPATIENT
Start: 2019-08-19 | End: 2019-08-21 | Stop reason: HOSPADM

## 2019-08-19 RX ORDER — MELATONIN
1000 DAILY
Status: DISCONTINUED | OUTPATIENT
Start: 2019-08-20 | End: 2019-08-21 | Stop reason: HOSPADM

## 2019-08-19 RX ORDER — POTASSIUM CHLORIDE 7.45 MG/ML
10 INJECTION INTRAVENOUS
Status: DISCONTINUED | OUTPATIENT
Start: 2019-08-19 | End: 2019-08-21 | Stop reason: HOSPADM

## 2019-08-19 RX ORDER — ALPRAZOLAM 0.5 MG/1
1 TABLET ORAL
Status: DISCONTINUED | OUTPATIENT
Start: 2019-08-19 | End: 2019-08-21 | Stop reason: HOSPADM

## 2019-08-19 RX ORDER — ACETAMINOPHEN 325 MG/1
650 TABLET ORAL EVERY 4 HOURS PRN
Status: DISCONTINUED | OUTPATIENT
Start: 2019-08-19 | End: 2019-08-21 | Stop reason: HOSPADM

## 2019-08-19 RX ORDER — LOSARTAN POTASSIUM 50 MG/1
50 TABLET ORAL EVERY 12 HOURS SCHEDULED
Status: DISCONTINUED | OUTPATIENT
Start: 2019-08-19 | End: 2019-08-21 | Stop reason: HOSPADM

## 2019-08-19 RX ORDER — SODIUM CHLORIDE 0.9 % (FLUSH) 0.9 %
3 SYRINGE (ML) INJECTION EVERY 12 HOURS SCHEDULED
Status: DISCONTINUED | OUTPATIENT
Start: 2019-08-19 | End: 2019-08-21 | Stop reason: HOSPADM

## 2019-08-19 RX ORDER — DIPHENHYDRAMINE HCL 25 MG
25 CAPSULE ORAL EVERY 6 HOURS PRN
Status: DISCONTINUED | OUTPATIENT
Start: 2019-08-19 | End: 2019-08-21 | Stop reason: HOSPADM

## 2019-08-19 RX ORDER — HYDRALAZINE HYDROCHLORIDE 25 MG/1
25 TABLET, FILM COATED ORAL EVERY 6 HOURS PRN
Status: DISCONTINUED | OUTPATIENT
Start: 2019-08-19 | End: 2019-08-21 | Stop reason: HOSPADM

## 2019-08-19 RX ORDER — PREDNISONE 1 MG/1
10 TABLET ORAL DAILY
COMMUNITY
End: 2019-10-01

## 2019-08-19 RX ORDER — MULTIPLE VITAMINS W/ MINERALS TAB 9MG-400MCG
1 TAB ORAL DAILY
COMMUNITY

## 2019-08-19 RX ORDER — ACETAMINOPHEN AND CODEINE PHOSPHATE 300; 30 MG/1; MG/1
2 TABLET ORAL ONCE
Status: COMPLETED | OUTPATIENT
Start: 2019-08-19 | End: 2019-08-19

## 2019-08-19 RX ORDER — TRAZODONE HYDROCHLORIDE 50 MG/1
50 TABLET ORAL NIGHTLY
Status: DISCONTINUED | OUTPATIENT
Start: 2019-08-19 | End: 2019-08-21 | Stop reason: HOSPADM

## 2019-08-19 RX ORDER — SODIUM CHLORIDE AND POTASSIUM CHLORIDE 150; 900 MG/100ML; MG/100ML
100 INJECTION, SOLUTION INTRAVENOUS CONTINUOUS
Status: DISCONTINUED | OUTPATIENT
Start: 2019-08-19 | End: 2019-08-20

## 2019-08-19 RX ORDER — CODEINE SULFATE 30 MG/1
60 TABLET ORAL EVERY 4 HOURS PRN
COMMUNITY
End: 2019-10-01

## 2019-08-19 RX ORDER — ACETAMINOPHEN 160 MG/5ML
640 SOLUTION ORAL EVERY 4 HOURS PRN
Status: ON HOLD | COMMUNITY
End: 2020-01-01

## 2019-08-19 RX ORDER — LEFLUNOMIDE 20 MG/1
20 TABLET ORAL DAILY
Status: DISCONTINUED | OUTPATIENT
Start: 2019-08-20 | End: 2019-08-21 | Stop reason: HOSPADM

## 2019-08-19 RX ORDER — NITROGLYCERIN 0.4 MG/1
0.4 TABLET SUBLINGUAL
Status: DISCONTINUED | OUTPATIENT
Start: 2019-08-19 | End: 2019-08-21 | Stop reason: HOSPADM

## 2019-08-19 RX ORDER — SODIUM CHLORIDE 0.9 % (FLUSH) 0.9 %
3-10 SYRINGE (ML) INJECTION AS NEEDED
Status: DISCONTINUED | OUTPATIENT
Start: 2019-08-19 | End: 2019-08-21 | Stop reason: HOSPADM

## 2019-08-19 RX ORDER — POTASSIUM CHLORIDE 750 MG/1
40 CAPSULE, EXTENDED RELEASE ORAL AS NEEDED
Status: DISCONTINUED | OUTPATIENT
Start: 2019-08-19 | End: 2019-08-21 | Stop reason: HOSPADM

## 2019-08-19 RX ORDER — TRAZODONE HYDROCHLORIDE 50 MG/1
50 TABLET ORAL NIGHTLY
COMMUNITY
End: 2020-01-01 | Stop reason: HOSPADM

## 2019-08-19 RX ORDER — ONDANSETRON 2 MG/ML
4 INJECTION INTRAMUSCULAR; INTRAVENOUS EVERY 6 HOURS PRN
Status: DISCONTINUED | OUTPATIENT
Start: 2019-08-19 | End: 2019-08-21 | Stop reason: HOSPADM

## 2019-08-19 RX ORDER — ACETAMINOPHEN 160 MG/5ML
650 SOLUTION ORAL EVERY 4 HOURS PRN
Status: DISCONTINUED | OUTPATIENT
Start: 2019-08-19 | End: 2019-08-21 | Stop reason: HOSPADM

## 2019-08-19 RX ORDER — PREDNISONE 10 MG/1
10 TABLET ORAL DAILY
Status: DISCONTINUED | OUTPATIENT
Start: 2019-08-20 | End: 2019-08-21 | Stop reason: HOSPADM

## 2019-08-19 RX ORDER — METOPROLOL SUCCINATE 25 MG/1
25 TABLET, EXTENDED RELEASE ORAL NIGHTLY
Status: DISCONTINUED | OUTPATIENT
Start: 2019-08-19 | End: 2019-08-21 | Stop reason: HOSPADM

## 2019-08-19 RX ORDER — BISACODYL 5 MG/1
5 TABLET, DELAYED RELEASE ORAL DAILY PRN
Status: DISCONTINUED | OUTPATIENT
Start: 2019-08-19 | End: 2019-08-21 | Stop reason: HOSPADM

## 2019-08-19 RX ORDER — SALSALATE 500 MG
1000 TABLET ORAL EVERY 6 HOURS PRN
Status: ON HOLD | COMMUNITY
End: 2020-01-01

## 2019-08-19 RX ORDER — ACETAMINOPHEN AND CODEINE PHOSPHATE 300; 30 MG/1; MG/1
2 TABLET ORAL EVERY 6 HOURS PRN
Status: DISCONTINUED | OUTPATIENT
Start: 2019-08-19 | End: 2019-08-21 | Stop reason: HOSPADM

## 2019-08-19 RX ORDER — LIDOCAINE 50 MG/G
1 PATCH TOPICAL DAILY PRN
Status: DISCONTINUED | OUTPATIENT
Start: 2019-08-19 | End: 2019-08-21 | Stop reason: HOSPADM

## 2019-08-19 RX ORDER — MAGNESIUM SULFATE HEPTAHYDRATE 40 MG/ML
4 INJECTION, SOLUTION INTRAVENOUS AS NEEDED
Status: DISCONTINUED | OUTPATIENT
Start: 2019-08-19 | End: 2019-08-21 | Stop reason: HOSPADM

## 2019-08-19 RX ORDER — LEVOTHYROXINE SODIUM 112 UG/1
112 TABLET ORAL
Status: DISCONTINUED | OUTPATIENT
Start: 2019-08-20 | End: 2019-08-21 | Stop reason: HOSPADM

## 2019-08-19 RX ADMIN — SODIUM CHLORIDE, PRESERVATIVE FREE 3 ML: 5 INJECTION INTRAVENOUS at 23:55

## 2019-08-19 RX ADMIN — POTASSIUM CHLORIDE AND SODIUM CHLORIDE 100 ML/HR: 900; 150 INJECTION, SOLUTION INTRAVENOUS at 23:58

## 2019-08-19 RX ADMIN — SENNOSIDES 8.6 MG: 8.6 TABLET, FILM COATED ORAL at 23:54

## 2019-08-19 RX ADMIN — ACETAMINOPHEN AND CODEINE PHOSPHATE 2 TABLET: 300; 30 TABLET ORAL at 23:17

## 2019-08-19 RX ADMIN — DIPHENHYDRAMINE HYDROCHLORIDE 25 MG: 25 CAPSULE ORAL at 22:57

## 2019-08-19 RX ADMIN — METOPROLOL SUCCINATE 25 MG: 25 TABLET, FILM COATED, EXTENDED RELEASE ORAL at 22:56

## 2019-08-19 RX ADMIN — ALPRAZOLAM 1 MG: 0.5 TABLET ORAL at 22:49

## 2019-08-19 RX ADMIN — TRAZODONE HYDROCHLORIDE 50 MG: 50 TABLET ORAL at 22:57

## 2019-08-19 RX ADMIN — POTASSIUM CHLORIDE 40 MEQ: 750 CAPSULE, EXTENDED RELEASE ORAL at 17:13

## 2019-08-19 RX ADMIN — CLONIDINE HYDROCHLORIDE 0.1 MG: 0.1 TABLET ORAL at 19:46

## 2019-08-19 RX ADMIN — ACETAMINOPHEN AND CODEINE PHOSPHATE 2 TABLET: 300; 30 TABLET ORAL at 18:35

## 2019-08-19 RX ADMIN — LOSARTAN POTASSIUM 50 MG: 50 TABLET, FILM COATED ORAL at 22:56

## 2019-08-19 RX ADMIN — CLONIDINE HYDROCHLORIDE 0.1 MG: 0.1 TABLET ORAL at 15:44

## 2019-08-19 RX ADMIN — AMLODIPINE BESYLATE 5 MG: 5 TABLET ORAL at 22:55

## 2019-08-19 RX ADMIN — CALCIUM GLUCONATE 1 G: 98 INJECTION, SOLUTION INTRAVENOUS at 22:55

## 2019-08-19 NOTE — TELEPHONE ENCOUNTER
Noted. I have never seen patient before. She is an established patient of Dr. Woodruff. I agree that she needs to go to ED. FYI- Dr. Woodruff. Thanks.

## 2019-08-19 NOTE — ED NOTES
Pt arrives from home with c/o high blood pressure today. Pt missed her BP medication yesterday. He also has chronic back pain.      Adry Schneider RN  08/19/19 0467

## 2019-08-19 NOTE — PROGRESS NOTES
Clinical Pharmacy Services: Medication History    Yeni Jimenez is a 86 y.o. female presenting to [facility] for   Chief Complaint   Patient presents with   • Hypertension       She  has a past medical history of Anxiety, Arthritis, Chronic back pain, Disease of thyroid gland, Hypertension, Hypothyroidism, Left bundle branch block, Osteoarthritis, Osteoporosis, Palpitation, Rheumatoid arthritis (CMS/HCC), and 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[facility]).    Allergies as of 08/19/2019 - Reviewed 08/19/2019   Allergen Reaction Noted   • Marigold  [calendula officinalis (marigold)]  03/11/2016   • Sulfa antibiotics  03/11/2016   • Sulfites Swelling 03/11/2016       Medication information was obtained from: Spouse  Pharmacy and Phone Number: [facility] [phone]    Prior to Admission Medications     Prescriptions Last Dose Informant Patient Reported? Taking?    acetaminophen (TYLENOL) 160 MG/5ML solution  Spouse/Significant Other Yes Yes    Take 640 mg by mouth Every 4 (Four) Hours As Needed for Mild Pain  (Takes with Codeine). PATIENT TOLERATES CHERRY FLAVOR ONLY    ALPRAZolam (XANAX) 1 MG tablet  Spouse/Significant Other Yes Yes    Take 1 mg by mouth 5 (Five) Times a Day As Needed for Anxiety.    amLODIPine (NORVASC) 5 MG tablet  Spouse/Significant Other Yes Yes    Take 5 mg by mouth 2 (Two) Times a Day.    ascorbic acid (VITAMIN C) 1000 MG tablet  Spouse/Significant Other Yes Yes    Take 1,000 mg by mouth Daily.    Cholecalciferol (VITAMIN D-3) 1000 UNITS capsule  Spouse/Significant Other Yes Yes    Take 1,000 Units by mouth Daily.    codeine 30 MG tablet  Spouse/Significant Other Yes Yes    Take 60 mg by mouth Every 4 (Four) Hours As Needed for Moderate Pain .    diphenhydrAMINE (BENADRYL) 25 mg capsule  Spouse/Significant Other Yes Yes    Take 25 mg by mouth Every 6 (Six) Hours As Needed (Difficulty swallowing).    ipratropium-albuterol (DUO-NEB) 0.5-2.5 mg/3 ml nebulizer  Spouse/Significant Other Yes Yes     Inhale 3 mL 3 (Three) Times a Day As Needed.    lansoprazole (PREVACID) 30 MG capsule  Spouse/Significant Other Yes Yes    Take 30 mg by mouth 2 (Two) Times a Day.    leflunomide (ARAVA) 20 MG tablet  Spouse/Significant Other Yes Yes    Take 20 mg by mouth Daily.    levothyroxine (SYNTHROID, LEVOTHROID) 112 MCG tablet  Spouse/Significant Other Yes Yes    Take 112 mcg by mouth Daily.    lidocaine (LIDODERM) 5 %  Spouse/Significant Other Yes Yes    Place 1 patch on the skin as directed by provider Daily As Needed for Mild Pain . Remove & Discard patch within 12 hours or as directed by MD    losartan (COZAAR) 50 MG tablet  Spouse/Significant Other Yes Yes    Take 50 mg by mouth 2 (Two) Times a Day.    metoprolol succinate XL (TOPROL-XL) 25 MG 24 hr tablet  Spouse/Significant Other Yes Yes    Take 25 mg by mouth Every Evening.    Multiple Vitamins-Minerals (MULTIVITAMIN WITH MINERALS) tablet tablet  Spouse/Significant Other Yes Yes    Take 1 tablet by mouth Daily.    Multiple Vitamins-Minerals (PRESERVISION/LUTEIN) capsule  Spouse/Significant Other Yes Yes    Take 1 capsule by mouth Daily.    Naloxegol Oxalate (MOVANTIK) 25 MG tablet  Spouse/Significant Other Yes Yes    Take 25 mg by mouth Daily.    PB-Hyoscy-Atropine-Scopolamine (DONNATAL) 16.2 MG per tablet  Spouse/Significant Other Yes Yes    Take 1 tablet by mouth Every 8 (Eight) Hours As Needed.    polyethylene glycol (MIRALAX) packet  Spouse/Significant Other No Yes    Take 17 g by mouth Daily.    Patient taking differently:  Take 17 g by mouth Every Other Day.    predniSONE (DELTASONE) 5 MG tablet  Spouse/Significant Other Yes Yes    Take 10 mg by mouth Daily.    salsalate (DISALCID) 500 MG tablet  Spouse/Significant Other Yes Yes    Take 1,000 mg by mouth 3 (Three) Times a Day As Needed.    senna (SENOKOT) 8.6 MG tablet tablet  Spouse/Significant Other Yes Yes    Take 2 tablets by mouth 2 (Two) Times a Day.    traZODone (DESYREL) 50 MG tablet  Spouse/Significant  Other Yes Yes    Take 50 mg by mouth Every Night.    Mirabegron ER (MYRBETRIQ) 50 MG tablet sustained-release 24 hour 24 hr tablet  Spouse/Significant Other Yes No    Take 50 mg by mouth Daily. PATIENT HAS NOT STARTED THIS MEDICATION            Medication notes: Removed per spouse: Ventolin, Restasis, Lunesta, FML suspension, Amitiza, Depo-Medrol, Nystatin cream, Patanol solution, Percocet, Promethazine, Evista, Ubiquinol    Added: Benadryl, Codeine, Tylenol    This medication list is complete to the best of my knowledge as of 8/19/2019    Please call if questions.    Madison Jo, Medication History Technician  8/19/2019 7:12 PM

## 2019-08-19 NOTE — ED PROVIDER NOTES
EMERGENCY DEPARTMENT ENCOUNTER    CHIEF COMPLAINT  Chief Complaint: hypertension  History given by: pt, spouse  History limited by: nothing  Room Number: S423/1  PMD: Arian Moyer MD Dr Jesse Adams, cardiology    HPI:  Pt is a 86 y.o. female who presents complaining of hypertension (systolics 200s) that started this morning. Pt states she 'was not feeing right' this morning. She c/o generalized weakness and dizziness, stating she had serious difficulty getting out of bed this morning. She c/o blurred vision for which she has seen several eye doctors. She denies cough, fever, CP, SOA and all other complaints at this time. Pt takes Amlodipine, Losartan, and Metoprolol for her BP. She has taken Amlodipine and Losartan as scheduled this morning. She took an early dose of Metoprolol in an attempt to relieve her hypertensive symptoms. Pt's spouse is a retired general surgeon; he is at bedside assisting with history. Pt has a hx of rheumatoid arthritis. She takes codeine and Tylenol for her arthritic pain. Pt ambulates with a walker. She is in PT.     Duration:  One day  Onset: gradual  Timing: constant  Location: generalized  Radiation: none  Quality: systolic 200s  Intensity/Severity: mild  Progression: unchanged  Associated Symptoms: generalized weakness, dizziness, blurred vision  Aggravating Factors: none  Alleviating Factors: none  Previous Episodes: hx of HTN  Treatment before arrival: She has taken Amlodipine and Losartan as scheduled this morning. She took an early dose of Metoprolol in an attempt to relieve her hypertensive symptoms.    PAST MEDICAL HISTORY  Active Ambulatory Problems     Diagnosis Date Noted   • No Active Ambulatory Problems     Resolved Ambulatory Problems     Diagnosis Date Noted   • No Resolved Ambulatory Problems     Past Medical History:   Diagnosis Date   • Anxiety    • Arthritis    • Chronic back pain    • Disease of thyroid gland    • Hypertension    • Hypothyroidism    •  Left bundle branch block    • Osteoarthritis    • Osteoporosis    • Palpitation    • Rheumatoid arthritis (CMS/HCC)    • 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HCC)        PAST SURGICAL HISTORY  Past Surgical History:   Procedure Laterality Date   • COLON RESECTION WITH COLOSTOMY     • COLONOSCOPY     • COLOSTOMY CLOSURE     • SIGMOIDOSCOPY     • TONSILLECTOMY         FAMILY HISTORY  Family History   Problem Relation Age of Onset   • Stomach cancer Paternal Grandmother    • Cancer Mother    • Stroke Father    • Aneurysm Father    • Diabetes Sister        SOCIAL HISTORY  Social History     Socioeconomic History   • Marital status:      Spouse name: Not on file   • Number of children: Not on file   • Years of education: Not on file   • Highest education level: Not on file   Tobacco Use   • Smoking status: Never Smoker   • Smokeless tobacco: Never Used   • Tobacco comment: caff use   Substance and Sexual Activity   • Alcohol use: No   • Drug use: No       ALLERGIES  Marigold  [calendula officinalis (marigold)]; Sulfa antibiotics; and Sulfites    REVIEW OF SYSTEMS  Review of Systems   Constitutional: Negative for fever.   HENT: Negative for sore throat.    Eyes: Positive for visual disturbance (blurred vision).   Respiratory: Negative for cough and shortness of breath.    Cardiovascular: Negative for chest pain.   Gastrointestinal: Negative for abdominal pain, diarrhea and vomiting.   Genitourinary: Negative for dysuria.   Musculoskeletal: Negative for neck pain.   Skin: Negative for rash.   Allergic/Immunologic: Negative.    Neurological: Positive for dizziness and weakness. Negative for numbness and headaches.   Hematological: Negative.    Psychiatric/Behavioral: Negative.    All other systems reviewed and are negative.      PHYSICAL EXAM  ED Triage Vitals   Temp Heart Rate Resp BP SpO2   08/19/19 1448 08/19/19 1448 08/19/19 1448 08/19/19 1448 08/19/19 1448   98.9 °F (37.2 °C) 73 20 (!) 198/98 91 %       Physical Exam    Constitutional: She is oriented to person, place, and time. No distress.   HENT:   Head: Normocephalic and atraumatic.   Eyes: EOM are normal. Pupils are equal, round, and reactive to light.   Neck: Normal range of motion. Neck supple.   Cardiovascular: Normal rate, regular rhythm and normal heart sounds.   Pulmonary/Chest: Effort normal and breath sounds normal. No respiratory distress.   Abdominal: Soft. There is no tenderness. There is no rebound and no guarding.   Musculoskeletal: Normal range of motion. She exhibits no edema.   Neurological: She is alert and oriented to person, place, and time. She has normal sensation and normal strength.   Normal FNF. Normal heel-shin-heel of left leg, but could not perform on right leg due to chronic problems. Normal speech. No facial droop.   Skin: Skin is warm and dry. No rash noted.   Psychiatric: Mood and affect normal.   Nursing note and vitals reviewed.      LAB RESULTS  Lab Results (last 24 hours)     Procedure Component Value Units Date/Time    CBC & Differential [[id number]] Collected:  08/19/19 1545    Specimen:  Blood Updated:  08/19/19 1625    Narrative:       The following orders were created for panel order CBC & Differential.  Procedure                               Abnormality         Status                     ---------                               -----------         ------                     CBC Auto Differential[[id number]]        Abnormal            Final result                 Please view results for these tests on the individual orders.    Comprehensive Metabolic Panel [[id number]]  (Abnormal) Collected:  08/19/19 1545    Specimen:  Blood Updated:  08/19/19 164     Glucose 77 mg/dL      BUN 12 mg/dL      Creatinine 0.29 mg/dL      Sodium 143 mmol/L      Potassium 2.7 mmol/L      Chloride 115 mmol/L      CO2 21.3 mmol/L      Calcium 6.3 mg/dL      Total Protein 4.7 g/dL      Albumin 2.40 g/dL      ALT (SGPT) 25 U/L      AST (SGOT) 32 U/L      Alkaline  Phosphatase 45 U/L      Total Bilirubin 0.2 mg/dL      eGFR Non African Amer >150 mL/min/1.73      Globulin 2.3 gm/dL      A/G Ratio 1.0 g/dL      BUN/Creatinine Ratio 41.4     Anion Gap 6.7 mmol/L     Narrative:       GFR Normal >60  Chronic Kidney Disease <60  Kidney Failure <15    Troponin [[id number]]  (Normal) Collected:  08/19/19 1545    Specimen:  Blood Updated:  08/19/19 1644     Troponin T <0.010 ng/mL     Narrative:       Troponin T Reference Range:  <= 0.03 ng/mL-   Negative for AMI  >0.03 ng/mL-     Abnormal for myocardial necrosis.  Clinicians would have to utilize clinical acumen, EKG, Troponin and serial changes to determine if it is an Acute Myocardial Infarction or myocardial injury due to an underlying chronic condition.     CBC Auto Differential [[id number]]  (Abnormal) Collected:  08/19/19 1545    Specimen:  Blood Updated:  08/19/19 1625     WBC 8.47 10*3/mm3      RBC 4.34 10*6/mm3      Hemoglobin 13.3 g/dL      Hematocrit 42.2 %      MCV 97.2 fL      MCH 30.6 pg      MCHC 31.5 g/dL      RDW 14.2 %      RDW-SD 51.4 fl      MPV 10.5 fL      Platelets 206 10*3/mm3      Neutrophil % 80.9 %      Lymphocyte % 8.7 %      Monocyte % 7.3 %      Eosinophil % 1.4 %      Basophil % 0.6 %      Immature Grans % 1.1 %      Neutrophils, Absolute 6.85 10*3/mm3      Lymphocytes, Absolute 0.74 10*3/mm3      Monocytes, Absolute 0.62 10*3/mm3      Eosinophils, Absolute 0.12 10*3/mm3      Basophils, Absolute 0.05 10*3/mm3      Immature Grans, Absolute 0.09 10*3/mm3      nRBC 0.0 /100 WBC     Urinalysis With Microscopic If Indicated (No Culture) - Urine, Clean Catch [[id number]]  (Normal) Collected:  08/19/19 1612    Specimen:  Urine, Clean Catch Updated:  08/19/19 1628     Color, UA Yellow     Appearance, UA Clear     pH, UA 7.5     Specific Gravity, UA 1.009     Glucose, UA Negative     Ketones, UA Negative     Bilirubin, UA Negative     Blood, UA Negative     Protein, UA Negative     Leuk Esterase, UA Negative      Nitrite, UA Negative     Urobilinogen, UA 0.2 E.U./dL    Narrative:       Urine microscopic not indicated.    Calcium, Ionized [[id number]]  (Abnormal) Collected:  08/19/19 1705    Specimen:  Blood Updated:  08/19/19 1819     Ionized Calcium 0.92 mmol/L      Ionized Calcium 3.7 mg/dL           I ordered the above labs and reviewed the results    RADIOLOGY  No orders to display        I ordered the above noted radiological studies. Interpreted by radiologist. Reviewed by me in PACS.       PROCEDURES  Procedures    EKG          EKG time: 1628  Rhythm/Rate: SR 66  P waves and VT: nml  QRS, axis: LAD, LVH  ST and T waves: non-specific ST changes laterally     Interpreted Contemporaneously by me, independently viewed  unchanged compared to prior 8/7/17      PROGRESS AND CONSULTS  ED Course as of Aug 19 2115   Mon Aug 19, 2019   1650 9.1 one yr ago Calcium: (!) 6.3 []      ED Course User Index  [] Chavez Hensley MD     1535. Discussed plan to control BP in ED and order labwork for workup. Pt agreeable to plan.     1540. Ordered Catapres tablet. Ordered labwork and EKG for workup.     1651. Ordered Micro-K for hypokalemia. Ordered ionized calcium for further workup.     1654. Updated spouse on labwork which shows low potassium and calcuim. Spouse would like pt admitted because he struggles to take care of pt at home. Informed spouse of plan to order ionized calcium. Spouse agreeable to plan.     1819. DAISHA Lara states pt would like children's Tylenol and codeine. Will inquire about dosing.     1823. Placed call to A. Ordered Tylenol.     1833. BP- 154/71 HR- 65 Temp- 98.9 °F (37.2 °C) (Tympanic) O2 sat- 95%  Rechecked the patient who is in NAD and is resting comfortably. Informed pt of low ionized calcium and plan to consult [facility] prior to admission. Pt understands and agrees with the plan, all questions answered.    1850. Discussed case with Dr Chowdary, [facility], who agrees to admit the pt.       MEDICAL DECISION  MAKING  Results were reviewed/discussed with the patient and they were also made aware of online access. Pt also made aware that some labs, such as cultures, will not be resulted during ER visit and follow up with PMD is necessary.     MDM  Number of Diagnoses or Management Options  Generalized weakness:   Hypertension, unspecified type:   Hypocalcemia:   Hypokalemia:   Diagnosis management comments: Patient's EKG was unchanged.  She was hypertensive but blood pressure improved with clonidine.  Patient was found to be hypokalemic and hypocalcemic.  She was given oral potassium.  Case was discussed with Dr. Chowdary and he agreed to admit the patient.       Amount and/or Complexity of Data Reviewed  Clinical lab tests: reviewed and ordered (Potassium 2.7)  Tests in the medicine section of CPT®: reviewed and ordered (See EKG procedure note.)  Decide to obtain previous medical records or to obtain history from someone other than the patient: yes (Epic)  Review and summarize past medical records: yes (Seen at  7/3/2019 for closed head contusion and chest wall contusion)  Discuss the patient with other providers: yes (Dr Chowdary, [facility])    Patient Progress  Patient progress: stable         DIAGNOSIS  Final diagnoses:   Hypokalemia   Hypocalcemia   Generalized weakness   Hypertension, unspecified type       DISPOSITION  ADMISSION    Discussed treatment plan and reason for admission with pt/family and admitting physician.  Pt/family voiced understanding of the plan for admission for further testing/treatment as needed.       Latest Documented Vital Signs:  As of 9:15 PM  BP- 134/84 HR- 61 Temp- 97.7 °F (36.5 °C) (Oral) O2 sat- 94%    --  Documentation assistance provided by maia King for Dr Ayden MD.  Information recorded by the maia was done at my direction and has been verified and validated by me.     Tali King  08/19/19 1929       Chavez Hensley MD  08/19/19 2117

## 2019-08-19 NOTE — TELEPHONE ENCOUNTER
08/19/19  1:45 PM  Yeni Jimenez  11/6/1932  Home Phone [phone]   Mobile [phone]       Yeni Jimenez is a patient of Dr Woodruff who's  called office stating that patient felt generalized weakness more than normal so she had her ( Dr Jimenez)take her BP which systolic was consistently above 200's. Patient has blurry vision but otherwise no other symptoms.  Instructed patient and  to go to ER immediately that BP was dangerously high.    Elisha Ordonez RN  [facility] Triage Nurse

## 2019-08-20 LAB
ALBUMIN SERPL-MCNC: 3.4 G/DL (ref 3.5–5.2)
ALP SERPL-CCNC: 54 U/L (ref 39–117)
ALT SERPL W P-5'-P-CCNC: 26 U/L (ref 1–33)
ANION GAP SERPL CALCULATED.3IONS-SCNC: 10.6 MMOL/L (ref 5–15)
AST SERPL-CCNC: 22 U/L (ref 1–32)
BASOPHILS # BLD AUTO: 0.05 10*3/MM3 (ref 0–0.2)
BASOPHILS NFR BLD AUTO: 0.6 % (ref 0–1.5)
BILIRUB CONJ SERPL-MCNC: <0.2 MG/DL (ref 0.2–0.3)
BILIRUB INDIRECT SERPL-MCNC: ABNORMAL MG/DL
BILIRUB SERPL-MCNC: 0.2 MG/DL (ref 0.2–1.2)
BUN BLD-MCNC: 23 MG/DL (ref 8–23)
BUN/CREAT SERPL: 38.3 (ref 7–25)
CA-I BLD-MCNC: 5.5 MG/DL (ref 4.6–5.4)
CA-I SERPL ISE-MCNC: 1.37 MMOL/L (ref 1.15–1.35)
CALCIUM SPEC-SCNC: 9.3 MG/DL (ref 8.6–10.5)
CHLORIDE SERPL-SCNC: 107 MMOL/L (ref 98–107)
CO2 SERPL-SCNC: 23.4 MMOL/L (ref 22–29)
CREAT BLD-MCNC: 0.6 MG/DL (ref 0.57–1)
DEPRECATED RDW RBC AUTO: 51.5 FL (ref 37–54)
EOSINOPHIL # BLD AUTO: 0.11 10*3/MM3 (ref 0–0.4)
EOSINOPHIL NFR BLD AUTO: 1.3 % (ref 0.3–6.2)
ERYTHROCYTE [DISTWIDTH] IN BLOOD BY AUTOMATED COUNT: 14.2 % (ref 12.3–15.4)
GFR SERPL CREATININE-BSD FRML MDRD: 95 ML/MIN/1.73
GLUCOSE BLD-MCNC: 97 MG/DL (ref 65–99)
HCT VFR BLD AUTO: 36.6 % (ref 34–46.6)
HGB BLD-MCNC: 11.7 G/DL (ref 12–15.9)
IMM GRANULOCYTES # BLD AUTO: 0.1 10*3/MM3 (ref 0–0.05)
IMM GRANULOCYTES NFR BLD AUTO: 1.2 % (ref 0–0.5)
LYMPHOCYTES # BLD AUTO: 1.36 10*3/MM3 (ref 0.7–3.1)
LYMPHOCYTES NFR BLD AUTO: 16.4 % (ref 19.6–45.3)
MAGNESIUM SERPL-MCNC: 2.2 MG/DL (ref 1.6–2.4)
MCH RBC QN AUTO: 31.5 PG (ref 26.6–33)
MCHC RBC AUTO-ENTMCNC: 32 G/DL (ref 31.5–35.7)
MCV RBC AUTO: 98.4 FL (ref 79–97)
MONOCYTES # BLD AUTO: 1.01 10*3/MM3 (ref 0.1–0.9)
MONOCYTES NFR BLD AUTO: 12.2 % (ref 5–12)
NEUTROPHILS # BLD AUTO: 5.68 10*3/MM3 (ref 1.7–7)
NEUTROPHILS NFR BLD AUTO: 68.3 % (ref 42.7–76)
NRBC BLD AUTO-RTO: 0 /100 WBC (ref 0–0.2)
PHOSPHATE SERPL-MCNC: 3 MG/DL (ref 2.5–4.5)
PLATELET # BLD AUTO: 209 10*3/MM3 (ref 140–450)
PMV BLD AUTO: 9.8 FL (ref 6–12)
POTASSIUM BLD-SCNC: 4.6 MMOL/L (ref 3.5–5.2)
PROT SERPL-MCNC: 5.7 G/DL (ref 6–8.5)
PTH-INTACT SERPL-MCNC: 25.7 PG/ML (ref 15–65)
RBC # BLD AUTO: 3.72 10*6/MM3 (ref 3.77–5.28)
SODIUM BLD-SCNC: 141 MMOL/L (ref 136–145)
TSH SERPL DL<=0.05 MIU/L-ACNC: 0.27 MIU/ML (ref 0.27–4.2)
WBC NRBC COR # BLD: 8.31 10*3/MM3 (ref 3.4–10.8)

## 2019-08-20 PROCEDURE — 83970 ASSAY OF PARATHORMONE: CPT | Performed by: INTERNAL MEDICINE

## 2019-08-20 PROCEDURE — 96361 HYDRATE IV INFUSION ADD-ON: CPT

## 2019-08-20 PROCEDURE — 80076 HEPATIC FUNCTION PANEL: CPT | Performed by: INTERNAL MEDICINE

## 2019-08-20 PROCEDURE — 99213 OFFICE O/P EST LOW 20 MIN: CPT | Performed by: NURSE PRACTITIONER

## 2019-08-20 PROCEDURE — G0378 HOSPITAL OBSERVATION PER HR: HCPCS

## 2019-08-20 PROCEDURE — 63710000001 PREDNISONE PER 5 MG: Performed by: INTERNAL MEDICINE

## 2019-08-20 PROCEDURE — 84443 ASSAY THYROID STIM HORMONE: CPT | Performed by: INTERNAL MEDICINE

## 2019-08-20 PROCEDURE — 82330 ASSAY OF CALCIUM: CPT | Performed by: INTERNAL MEDICINE

## 2019-08-20 PROCEDURE — 96366 THER/PROPH/DIAG IV INF ADDON: CPT

## 2019-08-20 PROCEDURE — 97110 THERAPEUTIC EXERCISES: CPT

## 2019-08-20 PROCEDURE — 80048 BASIC METABOLIC PNL TOTAL CA: CPT | Performed by: INTERNAL MEDICINE

## 2019-08-20 PROCEDURE — 83735 ASSAY OF MAGNESIUM: CPT | Performed by: INTERNAL MEDICINE

## 2019-08-20 PROCEDURE — 84100 ASSAY OF PHOSPHORUS: CPT | Performed by: INTERNAL MEDICINE

## 2019-08-20 PROCEDURE — 97161 PT EVAL LOW COMPLEX 20 MIN: CPT

## 2019-08-20 PROCEDURE — 85025 COMPLETE CBC W/AUTO DIFF WBC: CPT | Performed by: INTERNAL MEDICINE

## 2019-08-20 RX ADMIN — LEVOTHYROXINE SODIUM 112 MCG: 112 TABLET ORAL at 08:41

## 2019-08-20 RX ADMIN — LOSARTAN POTASSIUM 50 MG: 50 TABLET, FILM COATED ORAL at 08:37

## 2019-08-20 RX ADMIN — POTASSIUM & SODIUM PHOSPHATES POWDER PACK 280-160-250 MG 2 PACKET: 280-160-250 PACK at 04:04

## 2019-08-20 RX ADMIN — ALPRAZOLAM 1 MG: 0.5 TABLET ORAL at 21:39

## 2019-08-20 RX ADMIN — PANTOPRAZOLE SODIUM 40 MG: 40 TABLET, DELAYED RELEASE ORAL at 17:31

## 2019-08-20 RX ADMIN — CALCIUM CARBONATE-VITAMIN D TAB 500 MG-200 UNIT 1000 MG: 500-200 TAB at 21:29

## 2019-08-20 RX ADMIN — PANTOPRAZOLE SODIUM 40 MG: 40 TABLET, DELAYED RELEASE ORAL at 05:16

## 2019-08-20 RX ADMIN — SENNOSIDES 17.2 MG: 8.6 TABLET, FILM COATED ORAL at 21:29

## 2019-08-20 RX ADMIN — CALCIUM CARBONATE-VITAMIN D TAB 500 MG-200 UNIT 1000 MG: 500-200 TAB at 08:41

## 2019-08-20 RX ADMIN — SODIUM CHLORIDE, PRESERVATIVE FREE 3 ML: 5 INJECTION INTRAVENOUS at 21:29

## 2019-08-20 RX ADMIN — AMLODIPINE BESYLATE 5 MG: 5 TABLET ORAL at 08:37

## 2019-08-20 RX ADMIN — ACETAMINOPHEN AND CODEINE PHOSPHATE 2 TABLET: 300; 30 TABLET ORAL at 13:51

## 2019-08-20 RX ADMIN — CALCIUM CARBONATE-VITAMIN D TAB 500 MG-200 UNIT 1000 MG: 500-200 TAB at 17:29

## 2019-08-20 RX ADMIN — SENNOSIDES 17.2 MG: 8.6 TABLET, FILM COATED ORAL at 08:37

## 2019-08-20 RX ADMIN — SODIUM CHLORIDE, PRESERVATIVE FREE 3 ML: 5 INJECTION INTRAVENOUS at 08:38

## 2019-08-20 RX ADMIN — PREDNISONE 10 MG: 10 TABLET ORAL at 08:38

## 2019-08-20 RX ADMIN — NALOXEGOL OXALATE 25 MG: 25 TABLET, FILM COATED ORAL at 08:39

## 2019-08-20 RX ADMIN — OXYCODONE HYDROCHLORIDE AND ACETAMINOPHEN 1000 MG: 500 TABLET ORAL at 08:37

## 2019-08-20 RX ADMIN — LEFLUNOMIDE 20 MG: 20 TABLET ORAL at 08:38

## 2019-08-20 RX ADMIN — ALPRAZOLAM 1 MG: 0.5 TABLET ORAL at 04:05

## 2019-08-20 RX ADMIN — ACETAMINOPHEN AND CODEINE PHOSPHATE 2 TABLET: 300; 30 TABLET ORAL at 21:28

## 2019-08-20 RX ADMIN — METOPROLOL SUCCINATE 25 MG: 25 TABLET, FILM COATED, EXTENDED RELEASE ORAL at 21:29

## 2019-08-20 RX ADMIN — ACETAMINOPHEN AND CODEINE PHOSPHATE 2 TABLET: 300; 30 TABLET ORAL at 04:06

## 2019-08-20 RX ADMIN — POTASSIUM CHLORIDE 40 MEQ: 750 CAPSULE, EXTENDED RELEASE ORAL at 04:04

## 2019-08-20 RX ADMIN — VITAMIN D, TAB 1000IU (100/BT) 1000 UNITS: 25 TAB at 08:37

## 2019-08-20 RX ADMIN — LOSARTAN POTASSIUM 50 MG: 50 TABLET, FILM COATED ORAL at 21:29

## 2019-08-20 RX ADMIN — AMLODIPINE BESYLATE 5 MG: 5 TABLET ORAL at 21:29

## 2019-08-20 RX ADMIN — TRAZODONE HYDROCHLORIDE 50 MG: 50 TABLET ORAL at 21:29

## 2019-08-20 NOTE — H&P
Patient Name:  Yeni Jimenez  YOB: 1932  MRN:  [id number]  Admit Date:  8/19/2019  Patient Care Team:  Arian Moyer MD as PCP - General  Arian oMyer MD as PCP - Family Medicine  Rosalina, Irena Hunt MD as PCP - Claims Attributed      Subjective   History Present Illness     Chief Complaint   Patient presents with   • Hypertension       Ms. Jimenez is a 86 y.o. with a history of RA, HTN that presents to [facility] complaining of generalized weakness. This morning she felt not quite herself and was fatigued had generalized weakness. Her  who is a retired physician had checked her blood pressure and it was in the 200s so they decided to come to the ER. Given agents for HTN in the ER but was also found to have significant hypokalemia as well as hypocalcemia and has been admitted. Feels a little better. She does state that about a week ago she started taking an abx for UTI but no other medication changes    History of Present Illness  Review of Systems   Constitutional: Positive for activity change. Negative for fever.   HENT: Negative.    Eyes: Positive for visual disturbance (blurry vision for a couple of months being evaluated by eye doctors as an outpatient). Negative for pain.   Respiratory: Negative.    Cardiovascular: Negative for chest pain, palpitations and leg swelling.   Gastrointestinal: Negative.    Endocrine: Negative.    Genitourinary: Positive for dysuria (a little a week ago) and frequency.   Musculoskeletal: Positive for arthralgias and back pain.   Skin: Negative.    Allergic/Immunologic: Negative.    Neurological: Positive for weakness. Negative for syncope.   Hematological: Negative.    Psychiatric/Behavioral: Negative.         Personal History     Past Medical History:   Diagnosis Date   • Anxiety    • Arthritis    • Chronic back pain    • Disease of thyroid gland    • Hypertension    • Hypothyroidism    • Left bundle branch block     • Osteoarthritis    • Osteoporosis    • Palpitation    • Rheumatoid arthritis (CMS/HCC)    • 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HCC)      Past Surgical History:   Procedure Laterality Date   • COLON RESECTION WITH COLOSTOMY     • COLONOSCOPY     • COLOSTOMY CLOSURE     • SIGMOIDOSCOPY     • TONSILLECTOMY       Family History   Problem Relation Age of Onset   • Stomach cancer Paternal Grandmother    • Cancer Mother    • Stroke Father    • Aneurysm Father    • Diabetes Sister      Social History     Tobacco Use   • Smoking status: Never Smoker   • Smokeless tobacco: Never Used   • Tobacco comment: caff use   Substance Use Topics   • Alcohol use: No   • Drug use: No     Medications Prior to Admission   Medication Sig Dispense Refill Last Dose   • acetaminophen (TYLENOL) 160 MG/5ML solution Take 640 mg by mouth Every 4 (Four) Hours As Needed for Mild Pain  (Takes with Codeine). PATIENT TOLERATES ROSSI FLAVOR ONLY      • ALPRAZolam (XANAX) 1 MG tablet Take 1 mg by mouth 5 (Five) Times a Day As Needed for Anxiety.   Taking   • amLODIPine (NORVASC) 5 MG tablet Take 5 mg by mouth 2 (Two) Times a Day.   Taking   • ascorbic acid (VITAMIN C) 1000 MG tablet Take 1,000 mg by mouth Daily.   Taking   • Cholecalciferol (VITAMIN D-3) 1000 UNITS capsule Take 1,000 Units by mouth Daily.   Taking   • codeine 30 MG tablet Take 60 mg by mouth Every 4 (Four) Hours As Needed for Moderate Pain .      • diphenhydrAMINE (BENADRYL) 25 mg capsule Take 25 mg by mouth Every 6 (Six) Hours As Needed (Difficulty swallowing).      • ipratropium-albuterol (DUO-NEB) 0.5-2.5 mg/3 ml nebulizer Inhale 3 mL 3 (Three) Times a Day As Needed.      • lansoprazole (PREVACID) 30 MG capsule Take 30 mg by mouth 2 (Two) Times a Day.   Taking   • leflunomide (ARAVA) 20 MG tablet Take 20 mg by mouth Daily.   Taking   • levothyroxine (SYNTHROID, LEVOTHROID) 112 MCG tablet Take 112 mcg by mouth Daily.      • lidocaine (LIDODERM) 5 % Place 1 patch on the skin as directed by  provider Daily As Needed for Mild Pain . Remove & Discard patch within 12 hours or as directed by MD      • losartan (COZAAR) 50 MG tablet Take 50 mg by mouth 2 (Two) Times a Day.   Taking   • metoprolol succinate XL (TOPROL-XL) 25 MG 24 hr tablet Take 25 mg by mouth Every Evening.   Taking   • Multiple Vitamins-Minerals (MULTIVITAMIN WITH MINERALS) tablet tablet Take 1 tablet by mouth Daily.      • Multiple Vitamins-Minerals (PRESERVISION/LUTEIN) capsule Take 1 capsule by mouth Daily.      • Naloxegol Oxalate (MOVANTIK) 25 MG tablet Take 25 mg by mouth Daily.      • PB-Hyoscy-Atropine-Scopolamine (DONNATAL) 16.2 MG per tablet Take 1 tablet by mouth Every 8 (Eight) Hours As Needed.   Taking   • polyethylene glycol (MIRALAX) packet Take 17 g by mouth Daily. (Patient taking differently: Take 17 g by mouth Every Other Day.) 100 each 0 Taking   • predniSONE (DELTASONE) 5 MG tablet Take 10 mg by mouth Daily.      • salsalate (DISALCID) 500 MG tablet Take 1,000 mg by mouth 3 (Three) Times a Day As Needed.      • senna (SENOKOT) 8.6 MG tablet tablet Take 2 tablets by mouth 2 (Two) Times a Day.   Taking   • traZODone (DESYREL) 50 MG tablet Take 50 mg by mouth Every Night.      • Mirabegron ER (MYRBETRIQ) 50 MG tablet sustained-release 24 hour 24 hr tablet Take 50 mg by mouth Daily. PATIENT HAS NOT STARTED THIS MEDICATION        Allergies:    Allergies   Allergen Reactions   • Marigold  [Calendula Officinalis (Marigold)]    • Sulfa Antibiotics    • Sulfites Swelling     Made her face swells and caused her not to breath       Objective    Objective     Vital Signs  Temp:  [97.7 °F (36.5 °C)-98.9 °F (37.2 °C)] 97.7 °F (36.5 °C)  Heart Rate:  [61-81] 61  Resp:  [16-20] 16  BP: (110-202)/(71-98) 134/84  SpO2:  [91 %-95 %] 94 %  on   ;   Device (Oxygen Therapy): room air  Body mass index is 23.92 kg/m².    Physical Exam   Constitutional: She is oriented to person, place, and time. She appears well-developed and well-nourished. No  distress.   HENT:   Head: Normocephalic and atraumatic.   Mouth/Throat: Oropharynx is clear and moist.   Eyes: EOM are normal. Pupils are equal, round, and reactive to light. No scleral icterus.   Neck: Normal range of motion. Neck supple.   Cardiovascular: Normal rate and regular rhythm.   Pulmonary/Chest: Effort normal and breath sounds normal.   Abdominal: Soft. Bowel sounds are normal. She exhibits no distension. There is no tenderness.   Genitourinary:   Genitourinary Comments: Deferred    Musculoskeletal: She exhibits no edema or deformity.   Neurological: She is alert and oriented to person, place, and time. No cranial nerve deficit.   Skin: Skin is warm and dry. No rash noted.   Psychiatric: She has a normal mood and affect. Her behavior is normal. Thought content normal.   Nursing note and vitals reviewed.      Results Review:  I reviewed the patient's new clinical results.  I reviewed the patient's new imaging results and agree with the interpretation.  I reviewed the patient's other test results and agree with the interpretation  I personally viewed and interpreted the patient's EKG/Telemetry data  Discussed with ED provider.    Lab Results (last 24 hours)     Procedure Component Value Units Date/Time    CBC & Differential [[id number]] Collected:  08/19/19 1545    Specimen:  Blood Updated:  08/19/19 1625    Narrative:       The following orders were created for panel order CBC & Differential.  Procedure                               Abnormality         Status                     ---------                               -----------         ------                     CBC Auto Differential[[id number]]        Abnormal            Final result                 Please view results for these tests on the individual orders.    Comprehensive Metabolic Panel [[id number]]  (Abnormal) Collected:  08/19/19 1545    Specimen:  Blood Updated:  08/19/19 1646     Glucose 77 mg/dL      BUN 12 mg/dL      Creatinine 0.29 mg/dL       Sodium 143 mmol/L      Potassium 2.7 mmol/L      Chloride 115 mmol/L      CO2 21.3 mmol/L      Calcium 6.3 mg/dL      Total Protein 4.7 g/dL      Albumin 2.40 g/dL      ALT (SGPT) 25 U/L      AST (SGOT) 32 U/L      Alkaline Phosphatase 45 U/L      Total Bilirubin 0.2 mg/dL      eGFR Non African Amer >150 mL/min/1.73      Globulin 2.3 gm/dL      A/G Ratio 1.0 g/dL      BUN/Creatinine Ratio 41.4     Anion Gap 6.7 mmol/L     Narrative:       GFR Normal >60  Chronic Kidney Disease <60  Kidney Failure <15    Troponin [[id number]]  (Normal) Collected:  08/19/19 1545    Specimen:  Blood Updated:  08/19/19 1644     Troponin T <0.010 ng/mL     Narrative:       Troponin T Reference Range:  <= 0.03 ng/mL-   Negative for AMI  >0.03 ng/mL-     Abnormal for myocardial necrosis.  Clinicians would have to utilize clinical acumen, EKG, Troponin and serial changes to determine if it is an Acute Myocardial Infarction or myocardial injury due to an underlying chronic condition.     CBC Auto Differential [[id number]]  (Abnormal) Collected:  08/19/19 1545    Specimen:  Blood Updated:  08/19/19 1625     WBC 8.47 10*3/mm3      RBC 4.34 10*6/mm3      Hemoglobin 13.3 g/dL      Hematocrit 42.2 %      MCV 97.2 fL      MCH 30.6 pg      MCHC 31.5 g/dL      RDW 14.2 %      RDW-SD 51.4 fl      MPV 10.5 fL      Platelets 206 10*3/mm3      Neutrophil % 80.9 %      Lymphocyte % 8.7 %      Monocyte % 7.3 %      Eosinophil % 1.4 %      Basophil % 0.6 %      Immature Grans % 1.1 %      Neutrophils, Absolute 6.85 10*3/mm3      Lymphocytes, Absolute 0.74 10*3/mm3      Monocytes, Absolute 0.62 10*3/mm3      Eosinophils, Absolute 0.12 10*3/mm3      Basophils, Absolute 0.05 10*3/mm3      Immature Grans, Absolute 0.09 10*3/mm3      nRBC 0.0 /100 WBC     Magnesium [[id number]]  (Abnormal) Collected:  08/19/19 1545    Specimen:  Blood Updated:  08/19/19 2206     Magnesium 1.5 mg/dL     Phosphorus [[id number]]  (Abnormal) Collected:  08/19/19 1477    Specimen:   Blood Updated:  08/19/19 2216     Phosphorus 1.8 mg/dL     Vitamin D 25 Hydroxy [[id number]]  (Normal) Collected:  08/19/19 1545    Specimen:  Blood Updated:  08/19/19 2223     25 Hydroxy, Vitamin D 49.8 ng/ml     Narrative:       Reference Range for Total Vitamin D 25(OH)     Deficiency <20.0 ng/mL   Insufficiency 21-29 ng/mL   Sufficiency  ng/mL  Toxicity >100 ng/ml    Urinalysis With Microscopic If Indicated (No Culture) - Urine, Clean Catch [[id number]]  (Normal) Collected:  08/19/19 1612    Specimen:  Urine, Clean Catch Updated:  08/19/19 1628     Color, UA Yellow     Appearance, UA Clear     pH, UA 7.5     Specific Gravity, UA 1.009     Glucose, UA Negative     Ketones, UA Negative     Bilirubin, UA Negative     Blood, UA Negative     Protein, UA Negative     Leuk Esterase, UA Negative     Nitrite, UA Negative     Urobilinogen, UA 0.2 E.U./dL    Narrative:       Urine microscopic not indicated.    Calcium, Ionized [[id number]]  (Abnormal) Collected:  08/19/19 1705    Specimen:  Blood Updated:  08/19/19 1819     Ionized Calcium 0.92 mmol/L      Ionized Calcium 3.7 mg/dL           Imaging Results (last 24 hours)     ** No results found for the last 24 hours. **          Results for orders placed in visit on 12/20/13   SCANNED - ECHOCARDIOGRAM       ECG 12 Lead   Preliminary Result   HEART RATE= 66  bpm   RR Interval= 908  ms   AZ Interval= 174  ms   P Horizontal Axis= -17  deg   P Front Axis= 67  deg   QRSD Interval= 118  ms   QT Interval= 448  ms   QRS Axis= 7  deg   T Wave Axis= 132  deg   - ABNORMAL ECG -   Sinus rhythm   LVH with secondary repolarization abnormality   Anterior infarct, old   Electronically Signed By:    Date and Time of Study: 2019-08-19 16:28:59           Assessment/Plan     Active Hospital Problems    Diagnosis POA   • **Hypokalemia [E87.6] Yes   • Rheumatoid arthritis (CMS/HCC) [M06.9] Yes   • Hypertension [I10] Yes   • Hypocalcemia [E83.51] Yes   • Generalized weakness [R53.1] Yes   •  Hypomagnesemia [E83.42] Yes         · Replace Mag/K/Phos/Calcium and monitor closely.  · Check vitd, pth, tsh  · IVFs  · Agents for HTN  · PT consultation  · Continue majority of home medications  · I discussed the patients findings and my recommendations with patient and nursing staff.    VTE Prophylaxis - SCDs.  Code Status - Full code.       Cameron Chowdary MD  [facility]  08/19/19  10:58 PM

## 2019-08-20 NOTE — CONSULTS
Patient Name: Yeni Jimenez  :1932  86 y.o.    Date of Admission: 2019  Date of Consultation:  19  Encounter Provider: FABIANO Das  Place of Service: [facility]  Referring Provider: Cameron Chowdary MD  Patient Care Team:  Arian Moyer MD as PCP - General  Arian Moyer MD as PCP - Family Medicine  Finley, Irena Hunt MD as PCP - Claims Attributed      Chief complaint: weakness    History of Present Illness:  Mrs. Jimenez is an 86 year old female who was previously followed by Dr. Rodriguez, however was seen by Dr. Woodruff in 2018 to establish care. She has a history of palpitations and wore an event monitor in  that was largely unremarkable. She has a baseline intraventricular conduction delay that was unchanged. Patient had an appointment in April that she missed and was rescheduled for September. She had an echocardiogram performed in 2013 and this demonstrated normal biventricular size and function    She was in her normal state of health until yesterday she didn't feel right and felt weak. Her  checked her blood pressure and found SBP to be over 200. She called our office and was instructed to go to the emergency room. Upon arrival she was noted to have a blood pressure of 198/98. Potassium was low 2.7. She received on dose of clonidine oral. Potassium and calcium were replaced. She was admitted to [facility] for further evaluation. She has been restarted on her home regimen. BP has improved (BP much better with manual cuff over automatic). Last check per manual cuff 's. She is feeling better and hopes to go home.     She has not had any chest pain or pressure. She denies shortness of breath, PND, orthopnea. She has blurred vision that is chronic. She follows with an opthalmologist very closely. She had some dizziness. This as resolved. She denies fever, diarrhea, nausea, vomiting. She  denies syncope or near syncope.     Past Medical History:   Diagnosis Date   • Anxiety    • Arthritis    • Chronic back pain    • Disease of thyroid gland    • Hypertension    • Hypothyroidism    • Left bundle branch block    • Osteoarthritis    • Osteoporosis    • Palpitation    • Rheumatoid arthritis (CMS/HCC)    • 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HCC)        Past Surgical History:   Procedure Laterality Date   • COLON RESECTION WITH COLOSTOMY     • COLONOSCOPY     • COLOSTOMY CLOSURE     • SIGMOIDOSCOPY     • TONSILLECTOMY           Prior to Admission medications    Medication Sig Start Date End Date Taking? Authorizing Provider   acetaminophen (TYLENOL) 160 MG/5ML solution Take 640 mg by mouth Every 4 (Four) Hours As Needed for Mild Pain  (Takes with Codeine). PATIENT TOLERATES ROSSI FLAVOR ONLY   Yes Daniel Gottlieb MD   ALPRAZolam (XANAX) 1 MG tablet Take 1 mg by mouth 5 (Five) Times a Day As Needed for Anxiety. 11/11/14  Yes Nurse Renzo Pulido RN   amLODIPine (NORVASC) 5 MG tablet Take 5 mg by mouth 2 (Two) Times a Day. 1/12/15  Yes Nurse Renzo Pulido RN   ascorbic acid (VITAMIN C) 1000 MG tablet Take 1,000 mg by mouth Daily. 1/12/15  Yes Nurse Renzo Pulido RN   Cholecalciferol (VITAMIN D-3) 1000 UNITS capsule Take 1,000 Units by mouth Daily. 1/12/15  Yes Nurse Renzo Pulido RN   codeine 30 MG tablet Take 60 mg by mouth Every 4 (Four) Hours As Needed for Moderate Pain .   Yes Daniel Gottlieb MD   diphenhydrAMINE (BENADRYL) 25 mg capsule Take 25 mg by mouth Every 6 (Six) Hours As Needed (Difficulty swallowing).   Yes ProviderDaniel MD   ipratropium-albuterol (DUO-NEB) 0.5-2.5 mg/3 ml nebulizer Inhale 3 mL 3 (Three) Times a Day As Needed. 2/5/18  Yes Nurse Renzo Pulido RN   lansoprazole (PREVACID) 30 MG capsule Take 30 mg by mouth 2 (Two) Times a Day. 1/12/15  Yes Nurse Renzo Pulido RN   leflunomide (ARAVA) 20 MG tablet Take 20 mg by mouth Daily. 1/12/15  Yes Emergency, Nurse  Epic, RN   levothyroxine (SYNTHROID, LEVOTHROID) 112 MCG tablet Take 112 mcg by mouth Daily.   Yes Provider, MD Daniel   lidocaine (LIDODERM) 5 % Place 1 patch on the skin as directed by provider Daily As Needed for Mild Pain . Remove & Discard patch within 12 hours or as directed by MD   Yes Provider, MD Daniel   losartan (COZAAR) 50 MG tablet Take 50 mg by mouth 2 (Two) Times a Day.   Yes Provider, MD Daniel   metoprolol succinate XL (TOPROL-XL) 25 MG 24 hr tablet Take 25 mg by mouth Every Evening. 11/4/14  Yes Emergency, Nurse Epic, RN   Multiple Vitamins-Minerals (MULTIVITAMIN WITH MINERALS) tablet tablet Take 1 tablet by mouth Daily.   Yes ProviderDaniel MD   Multiple Vitamins-Minerals (PRESERVISION/LUTEIN) capsule Take 1 capsule by mouth Daily.   Yes Emergency, Nurse Renzo RN   Naloxegol Oxalate (MOVANTIK) 25 MG tablet Take 25 mg by mouth Daily.   Yes Provider, MD Daniel   PB-Hyoscy-Atropine-Scopolamine (DONNATAL) 16.2 MG per tablet Take 1 tablet by mouth Every 8 (Eight) Hours As Needed. 1/12/15  Yes Emergency, Nurse Epic, RN   polyethylene glycol (MIRALAX) packet Take 17 g by mouth Daily.  Patient taking differently: Take 17 g by mouth Every Other Day. 7/9/18  Yes Gumaro Stewart PA   predniSONE (DELTASONE) 5 MG tablet Take 10 mg by mouth Daily.   Yes ProviderDaniel MD   salsalate (DISALCID) 500 MG tablet Take 1,000 mg by mouth 3 (Three) Times a Day As Needed.   Yes Provider, MD Daniel   senna (SENOKOT) 8.6 MG tablet tablet Take 2 tablets by mouth 2 (Two) Times a Day.   Yes Provider, MD Daniel   traZODone (DESYREL) 50 MG tablet Take 50 mg by mouth Every Night.   Yes ProviderDaniel MD   Mirabegron ER (MYRBETRIQ) 50 MG tablet sustained-release 24 hour 24 hr tablet Take 50 mg by mouth Daily. PATIENT HAS NOT STARTED THIS MEDICATION 6/14/19   Emergency, Nurse DAISHA Muller       Allergies   Allergen Reactions   • Marigold  [Calendula Officinalis (Marigold)]    • Sulfa  "Antibiotics    • Sulfites Swelling     Made her face swells and caused her not to breath       Social History     Socioeconomic History   • Marital status:      Spouse name: Not on file   • Number of children: Not on file   • Years of education: Not on file   • Highest education level: Not on file   Tobacco Use   • Smoking status: Never Smoker   • Smokeless tobacco: Never Used   • Tobacco comment: caff use   Substance and Sexual Activity   • Alcohol use: No   • Drug use: No   • Sexual activity: Defer       Family History   Problem Relation Age of Onset   • Stomach cancer Paternal Grandmother    • Cancer Mother    • Stroke Father    • Aneurysm Father    • Diabetes Sister        REVIEW OF SYSTEMS:   All systems reviewed.  Pertinent positives identified in HPI.  All other systems are negative.      Objective:     Vitals:    08/19/19 2048 08/19/19 2343 08/20/19 0824 08/20/19 1300   BP: 134/84 138/68 157/64 173/83   BP Location: Left arm Left arm Right arm Right arm   Patient Position: Lying Lying Lying Lying   Pulse: 61 59 60 60   Resp: 16 16 16 16   Temp: 97.7 °F (36.5 °C) 97.8 °F (36.6 °C) 97.9 °F (36.6 °C) 98.1 °F (36.7 °C)   TempSrc: Oral Oral Oral Oral   SpO2: 94% 94% 94% 95%   Weight: 57.4 kg (126 lb 9.6 oz)      Height: 154.9 cm (61\")        Body mass index is 23.92 kg/m².    Physical Exam:  Constitutional: She is oriented to person, place, and time. She appears well-developed. She does not appear ill.   HENT:   Head: Normocephalic and atraumatic. Head is without contusion.   Right Ear: Hearing normal. No drainage.   Left Ear: Hearing normal. No drainage.   Nose: No nasal deformity. No epistaxis.   Eyes: Lids are normal. Right eye exhibits no exudate. Left eye exhibits no exudate.  Neck: No JVD present. Carotid bruit is not present. No tracheal deviation present. No thyroid mass and no thyromegaly present.   Cardiovascular: Normal rate, regular rhythm and normal heart sounds.    Pulses:       Posterior " tibial pulses are 2+ on the right side, and 2+ on the left side.   Pulmonary/Chest: Effort normal and breath sounds normal.   Abdominal: Soft. Normal appearance and bowel sounds are normal. There is no tenderness.   Musculoskeletal: Normal range of motion.        Right shoulder: She exhibits no deformity.        Left shoulder: She exhibits no deformity.   Neurological: She is alert and oriented to person, place, and time. She has normal strength.   Skin: Skin is warm, dry and intact. No rash noted.   Psychiatric: She has a normal mood and affect. Her behavior is normal. Thought content normal.   Vitals reviewed      Lab Review:     Results from last 7 days   Lab Units 08/20/19  0344   SODIUM mmol/L 141   POTASSIUM mmol/L 4.6   CHLORIDE mmol/L 107   CO2 mmol/L 23.4   BUN mg/dL 23   CREATININE mg/dL 0.60   CALCIUM mg/dL 9.3   BILIRUBIN mg/dL 0.2   ALK PHOS U/L 54   ALT (SGPT) U/L 26   AST (SGOT) U/L 22   GLUCOSE mg/dL 97     Results from last 7 days   Lab Units 08/19/19  1545   TROPONIN T ng/mL <0.010     Results from last 7 days   Lab Units 08/20/19  0344   WBC 10*3/mm3 8.31   HEMOGLOBIN g/dL 11.7*   HEMATOCRIT % 36.6   PLATELETS 10*3/mm3 209         Results from last 7 days   Lab Units 08/20/19  0344   MAGNESIUM mg/dL 2.2                   Current Facility-Administered Medications:   •  acetaminophen (TYLENOL) tablet 650 mg, 650 mg, Oral, Q4H PRN **OR** acetaminophen (TYLENOL) 160 MG/5ML solution 650 mg, 650 mg, Oral, Q4H PRN **OR** acetaminophen (TYLENOL) suppository 650 mg, 650 mg, Rectal, Q4H PRN, Cameron Chowdary MD  •  acetaminophen-codeine (TYLENOL #3) 300-30 MG per tablet 2 tablet, 2 tablet, Oral, Q6H PRN, Cameron Chowdary MD, 2 tablet at 08/20/19 1351  •  ALPRAZolam (XANAX) tablet 1 mg, 1 mg, Oral, 5x Daily PRN, Cameron Chowdary MD, 1 mg at 08/20/19 0405  •  amLODIPine (NORVASC) tablet 5 mg, 5 mg, Oral, BID, Cameron Chowdary MD, 5 mg at 08/20/19 0837  •  bisacodyl (DULCOLAX) EC tablet 5 mg, 5  mg, Oral, Daily PRN, Cameron Chowdary MD  •  calcium-vitamin D 500-200 MG-UNIT tablet 1,000 mg, 1,000 mg, Oral, TID, Cameron Chowdary MD, 1,000 mg at 08/20/19 0841  •  cholecalciferol (VITAMIN D3) tablet 1,000 Units, 1,000 Units, Oral, Daily, Cameron Chowdary MD, 1,000 Units at 08/20/19 0837  •  diphenhydrAMINE (BENADRYL) capsule 25 mg, 25 mg, Oral, Q6H PRN, Cameron Chowdary MD, 25 mg at 08/19/19 2257  •  hydrALAZINE (APRESOLINE) tablet 25 mg, 25 mg, Oral, Q6H PRN, Cameron Chowdary MD  •  ipratropium-albuterol (DUO-NEB) nebulizer solution 3 mL, 3 mL, Nebulization, Q4H PRN, Cameron Chowdary MD  •  leflunomide (ARAVA) tablet 20 mg, 20 mg, Oral, Daily, Cameron Chowdary MD, 20 mg at 08/20/19 0838  •  levothyroxine (SYNTHROID, LEVOTHROID) tablet 112 mcg, 112 mcg, Oral, Q AM, Cameron Chowdary MD, 112 mcg at 08/20/19 0841  •  lidocaine (LIDODERM) 5 % 1 patch, 1 patch, Transdermal, Daily PRN, Cameron Chowdary MD  •  losartan (COZAAR) tablet 50 mg, 50 mg, Oral, Q12H, Cameron Chowdary MD, 50 mg at 08/20/19 0837  •  Magnesium Sulfate 2 gram Bolus, followed by 8 gram infusion (total Mg dose 10 grams)- Mg less than or equal to 1mg/dL, 2 g, Intravenous, PRN **OR** Magnesium Sulfate 2 gram / 50mL Infusion (GIVE X 3 BAGS TO EQUAL 6GM TOTAL DOSE) - Mg 1.1 - 1.5 mg/dl, 2 g, Intravenous, PRN **OR** Magnesium Sulfate 4 gram infusion- Mg 1.6-1.9 mg/dL, 4 g, Intravenous, PRN, Cameron Chowdary MD  •  metoprolol succinate XL (TOPROL-XL) 24 hr tablet 25 mg, 25 mg, Oral, Nightly, Cameron Chowdary MD, 25 mg at 08/19/19 9806  •  Naloxegol Oxalate (MOVANTIK) tablet 25 mg, 25 mg, Oral, Daily, Cameron Chowdary MD, 25 mg at 08/20/19 0839  •  nitroglycerin (NITROSTAT) SL tablet 0.4 mg, 0.4 mg, Sublingual, Q5 Min PRN, Cameron Chowdary MD  •  ondansetron (ZOFRAN) injection 4 mg, 4 mg, Intravenous, Q6H PRN, Cameron Chowdary MD  •  pantoprazole (PROTONIX) EC tablet 40 mg, 40 mg, Oral, BID AC,  Cameron Chowdary MD, 40 mg at 08/20/19 0516  •  PB-Hyoscy-Atropine-Scopolamine (DONNATAL) per tablet 16.2 mg, 1 tablet, Oral, Q8H PRN, Cameron Chowdary MD  •  polyethylene glycol 3350 powder (packet), 17 g, Oral, Every Other Day, Cameron Chowdary MD  •  potassium & sodium phosphates (PHOS-NAK) 280-160-250 MG packet - for Phosphorus less than 1.25 mg/dL, 2 packet, Oral, Q6H PRN **OR** potassium & sodium phosphates (PHOS-NAK) 280-160-250 MG packet - for Phosphorus 1.25 - 2.5 mg/dL, 2 packet, Oral, Q6H PRN, Cameron Chowdary MD, 2 packet at 08/20/19 0404  •  potassium chloride (MICRO-K) CR capsule 40 mEq, 40 mEq, Oral, PRN, 40 mEq at 08/20/19 0404 **OR** potassium chloride (KLOR-CON) packet 40 mEq, 40 mEq, Oral, PRN **OR** potassium chloride 10 mEq in 100 mL IVPB, 10 mEq, Intravenous, Q1H PRN, Cameron Chowdary MD  •  predniSONE (DELTASONE) tablet 10 mg, 10 mg, Oral, Daily, Cameron Chowdary MD, 10 mg at 08/20/19 0838  •  senna (SENOKOT) tablet 17.2 mg, 2 tablet, Oral, BID, Cameron Chowdary MD, 17.2 mg at 08/20/19 0837  •  sodium chloride 0.9 % flush 3 mL, 3 mL, Intravenous, Q12H, Cameron Chowdary MD, 3 mL at 08/20/19 0838  •  sodium chloride 0.9 % flush 3-10 mL, 3-10 mL, Intravenous, PRN, Cameron Chowdary MD  •  traZODone (DESYREL) tablet 50 mg, 50 mg, Oral, Nightly, Cameron Chowdary MD, 50 mg at 08/19/19 2258  •  vitamin C (ASCORBIC ACID) tablet 1,000 mg, 1,000 mg, Oral, Daily, Cameron Chowdary MD, 1,000 mg at 08/20/19 0837    Assessment and Plan:       1. Hypertensive urgency - improved with reinstitution of home regimen. She feels better today.   2. Hypokalemia - denies GI loss. It was replaced and is stable today.   3. Hypothyroidism - TSH is mildly low. Defer to internal medicine.   4.  History of palpitations - event monitor in 2017 largely unremarkable.   5. RA - on chronic steroids.      Not much to add from cardiac standpoint. They have an appointment with Dr. Woodruff  9/24.  asked if she could be seen sooner. I will reach out to schedulers to see if there is any thing available. They have cancelled their last several appointments due to patient's limited mobility and pain due to RA.     Marine Ashby, APRN  08/20/19  3:50 PM

## 2019-08-20 NOTE — PROGRESS NOTES
"   LOS: 0 days   Patient Care Team:  Arian Moyer MD as PCP - General  Arian Moyer MD as PCP - Family Medicine  [place], Irena Hunt MD as PCP - Claims Attributed    Chief Complaint: wants to go home    Subjective     Feeling better this AM. Wants to go home.         Subjective:  Symptoms:  Improved.  No shortness of breath, malaise, cough, chest pain, weakness, headache, chest pressure, anorexia, diarrhea or anxiety.    Diet:  Adequate intake.  No nausea or vomiting.    Activity level: Normal.    Pain:  She reports no pain.        History taken from: patient chart family RN    Objective     Vital Signs  Temp:  [97.7 °F (36.5 °C)-98.9 °F (37.2 °C)] 97.9 °F (36.6 °C)  Heart Rate:  [59-81] 60  Resp:  [16-20] 16  BP: (110-202)/(64-98) 157/64    Objective:  General Appearance:  Comfortable and in no acute distress.    Vital signs: (most recent): Blood pressure 157/64, pulse 60, temperature 97.9 °F (36.6 °C), temperature source Oral, resp. rate 16, height 154.9 cm (61\"), weight 57.4 kg (126 lb 9.6 oz), SpO2 94 %.  Vital signs are normal.  No fever.    Output: Producing urine.    HEENT: Normal HEENT exam.    Lungs:  Normal effort and normal respiratory rate.  Breath sounds clear to auscultation.    Heart: Normal rate.  Regular rhythm.    Abdomen: Abdomen is soft.  Bowel sounds are normal.   There is no abdominal tenderness.     Extremities: There is no dependent edema.    Pulses: Distal pulses are intact.    Neurological: Patient is alert and oriented to person, place and time.    Skin:  Warm and dry.  No rash.             Results Review:     I reviewed the patient's new clinical results.  I reviewed the patient's other test results and agree with the interpretation  I personally viewed and interpreted the patient's EKG/Telemetry data  Discussed with pt, , and RN    Medication Review: reviewed    Assessment/Plan       Hypokalemia    Rheumatoid arthritis (CMS/HCC)    Hypertension    " Hypocalcemia    Generalized weakness    Hypomagnesemia          Plan:   (Labs have normalized today  TSH, VitD, iPTH, Mg++, Phos, Cl all okay  BPs improved on home regimen  Will ask pt's Cardiologist to see (had appointment next week anyway)  PT dontrell noted  D/w pt's  (89yo retired gen surgeon)  Hopefully home tomorrow).       Mat Long MD  08/20/19  11:27 AM    Time: 20min

## 2019-08-20 NOTE — DISCHARGE PLACEMENT REQUEST
"Bhaskar Jimenez (86 y.o. Female)     Date of Birth Social Security Number Address Home Phone MRN    11/06/1932  [address] [facility] [address] [phone] [id number]    Muslim Marital Status          P[employer]alism        Admission Date Admission Type Admitting Provider Attending Provider Department, Room/Bed    8/19/19 Emergency Cameron Chowdary MD Benton, John B, MD [address], [address]    Discharge Date Discharge Disposition Discharge Destination                       Attending Provider:  Mat Long MD    Allergies:  Marigold  [Calendula Officinalis (Marigold)], Sulfa Antibiotics, Sulfites    Isolation:  None   Infection:  None   Code Status:  CPR    Ht:  154.9 cm (61\")   Wt:  57.4 kg (126 lb 9.6 oz)    Admission Cmt:  None   Principal Problem:  Hypokalemia [E87.6]                 Active Insurance as of 8/19/2019     Primary Coverage     Payor Plan Insurance Group Employer/Plan Group    MEDICARE MEDICARE A & B      Payor Plan Address Payor Plan Phone Number Payor Plan Fax Number Effective Dates    [address] [phone]  11/1/1997 - None Entered    [facility] [id number]       Subscriber Name Subscriber Birth Date Member ID       BHASKAR JIMENEZ 11/6/1932 [id number]           Secondary Coverage     Payor Plan Insurance Group Employer/Plan Group    AARP MED SUPP AARP HEALTH CARE OPTIONS      Payor Plan Address Payor Plan Phone Number Payor Plan Fax Number Effective Dates    [facility] [phone]  1/1/2017 - None Entered    [address]       [facility] [address]       Subscriber Name Subscriber Birth Date Member ID       BHASKAR JIMENEZ 11/6/1932 [id number]                 Emergency Contacts      (Rel.) Home Phone Work Phone Mobile Phone    Sherman Hopkinsn (Daughter) [phone] -- [phone]    Pa Jimenez (Spouse) [phone] -- [phone]            "

## 2019-08-20 NOTE — PLAN OF CARE
Problem: Skin Injury Risk (Adult)  Goal: Identify Related Risk Factors and Signs and Symptoms  Outcome: Ongoing (interventions implemented as appropriate)    Goal: Skin Health and Integrity  Outcome: Ongoing (interventions implemented as appropriate)      Problem: Patient Care Overview  Goal: Plan of Care Review  Outcome: Ongoing (interventions implemented as appropriate)   08/20/19 1440   OTHER   Outcome Summary Pt c/o pain, pain medication given. Vitals stable, Will continue to monitor.     Goal: Individualization and Mutuality  Outcome: Ongoing (interventions implemented as appropriate)    Goal: Discharge Needs Assessment  Outcome: Ongoing (interventions implemented as appropriate)    Goal: Interprofessional Rounds/Family Conf  Outcome: Ongoing (interventions implemented as appropriate)      Problem: Fall Risk (Adult)  Goal: Identify Related Risk Factors and Signs and Symptoms  Outcome: Ongoing (interventions implemented as appropriate)    Goal: Absence of Fall  Outcome: Ongoing (interventions implemented as appropriate)      Problem: Pain, Chronic (Adult)  Goal: Identify Related Risk Factors and Signs and Symptoms  Outcome: Ongoing (interventions implemented as appropriate)    Goal: Acceptable Pain/Comfort Level and Functional Ability  Outcome: Ongoing (interventions implemented as appropriate)

## 2019-08-20 NOTE — PLAN OF CARE
Problem: Patient Care Overview  Goal: Plan of Care Review  Outcome: Ongoing (interventions implemented as appropriate)   08/20/19 8542   Coping/Psychosocial   Plan of Care Reviewed With patient   Plan of Care Review   Progress no change   OTHER   Outcome Summary new admit to  423 for low potassium , several abn electrolytes , - they are currently being replaced per mar , pt takes xanax , and tylenol 3 PRN , caregiver at bedside , pt currently sleeping

## 2019-08-20 NOTE — PLAN OF CARE
Problem: Patient Care Overview  Goal: Plan of Care Review   08/20/19 1023   Coping/Psychosocial   Plan of Care Reviewed With patient   OTHER   Outcome Summary Pt. will benefit from skilled inpt. P.T. to address her functional deficits and to assist pt. in regaining her maximum level of independence with functional mobility.

## 2019-08-20 NOTE — THERAPY EVALUATION
Patient Name: Yeni Jimenez  : 1932    MRN: [id number]                              Today's Date: 2019       Admit Date: 2019    Visit Dx:     ICD-10-CM ICD-9-CM   1. Hypokalemia E87.6 276.8   2. Hypocalcemia E83.51 275.41   3. Generalized weakness R53.1 780.79   4. Hypertension, unspecified type I10 401.9     Patient Active Problem List   Diagnosis   • Hypokalemia   • Rheumatoid arthritis (CMS/HCC)   • Hypertension   • Hypocalcemia   • Generalized weakness   • Hypomagnesemia     Past Medical History:   Diagnosis Date   • Anxiety    • Arthritis    • Chronic back pain    • Disease of thyroid gland    • Hypertension    • Hypothyroidism    • Left bundle branch block    • Osteoarthritis    • Osteoporosis    • Palpitation    • Rheumatoid arthritis (CMS/HCC)    • 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HCC)      Past Surgical History:   Procedure Laterality Date   • COLON RESECTION WITH COLOSTOMY     • COLONOSCOPY     • COLOSTOMY CLOSURE     • SIGMOIDOSCOPY     • TONSILLECTOMY       General Information     Row Name 19 1018          PT Evaluation Time/Intention    Document Type  evaluation Pt. admitted with Hypokalemia; HTN  -MS     Mode of Treatment  physical therapy;individual therapy  -MS     Row Name 19 1018          General Information    Patient Profile Reviewed?  yes  -MS     Prior Level of Function  independent: Occ. use of Rwx just P.T.A.  -MS     Existing Precautions/Restrictions  fall  (Significant)   -MS     Barriers to Rehab  none identified  -MS     Row Name 19 1018          Relationship/Environment    Lives With  spouse  -MS     Row Name 19 1018          Cognitive Assessment/Intervention- PT/OT    Orientation Status (Cognition)  oriented to;place;person  -MS     Row Name 19 1018          Safety Issues, Functional Mobility    Comment, Safety Issues/Impairments (Mobility)  Gait belt used for safety  -MS       User Key  (r) = Recorded By, (t) = Taken By, (c) = Cosigned By     Initials Name Provider Type    Karri Yo ROBERTH, PT Physical Therapist        Mobility     Row Name 08/20/19 1019          Bed Mobility Assessment/Treatment    Bed Mobility Assessment/Treatment  supine-sit;sit-supine  -MS     Supine-Sit [place] (Bed Mobility)  contact guard  -MS     Sit-Supine [place] (Bed Mobility)  contact guard  -MS     Assistive Device (Bed Mobility)  bed rails  -MS     Row Name 08/20/19 1019          Sit-Stand Transfer    Sit-Stand [place] (Transfers)  contact guard;2 person assist  -MS     Assistive Device (Sit-Stand Transfers)  walker, front-wheeled  -MS     Row Name 08/20/19 1019          Gait/Stairs Assessment/Training    [place] Level (Gait)  contact guard  -MS     Assistive Device (Gait)  walker, front-wheeled  -MS     Distance in Feet (Gait)  30 feet  -MS     Bilateral Gait Deviations  forward flexed posture  -MS     Comment (Gait/Stairs)  Verbal/tactile cues for posture correction and Rwx guidance. Pt. refuses to ambulate a longer distance this day due to fatigue, weakness.   -MS       User Key  (r) = Recorded By, (t) = Taken By, (c) = Cosigned By    Initials Name Provider Type    Kaylin Yoartur LEPE, PT Physical Therapist        Obj/Interventions     Row Name 08/20/19 1020          General ROM    GENERAL ROM COMMENTS  BUE/LE (WFL's)  -MS     Row Name 08/20/19 1020          MMT (Manual Muscle Testing)    General MMT Comments  BUE/LE (3+/5)  -MS       User Key  (r) = Recorded By, (t) = Taken By, (c) = Cosigned By    Initials Name Provider Type    MS Ford Karri LEPE, PT Physical Therapist        Goals/Plan     Row Name 08/20/19 1021          Bed Mobility Goal 1 (PT)    Activity/Assistive Device (Bed Mobility Goal 1, PT)  bed mobility activities, all  -MS     [place] Level/Cues Needed (Bed Mobility Goal 1, PT)  independent  -MS     Time Frame (Bed Mobility Goal 1, PT)  long term goal (LTG);5 days  -MS     Row Name 08/20/19 1021          Transfer Goal 1 (PT)     Activity/Assistive Device (Transfer Goal 1, PT)  transfers, all With or without AAD  -MS     [place] Level/Cues Needed (Transfer Goal 1, PT)  independent  -MS     Time Frame (Transfer Goal 1, PT)  long term goal (LTG);5 days  -MS     Row Name 08/20/19 1021          Gait Training Goal 1 (PT)    Activity/Assistive Device (Gait Training Goal 1, PT)  gait (walking locomotion) With or without AAD  -MS     [place] Level (Gait Training Goal 1, PT)  independent  -MS     Distance (Gait Goal 1, PT)  100 feet  -MS     Time Frame (Gait Training Goal 1, PT)  long term goal (LTG);5 days  -MS       User Key  (r) = Recorded By, (t) = Taken By, (c) = Cosigned By    Initials Name Provider Type    Karri Yo, PT Physical Therapist        Clinical Impression     Row Name 08/20/19 1021          Pain Assessment    Additional Documentation  Pain Scale: Numbers Pre/Post-Treatment (Group)  -MS     Row Name 08/20/19 1021          Pain Scale: Numbers Pre/Post-Treatment    Pain Scale: Numbers, Pretreatment  0/10 - no pain  -MS     Pain Scale: Numbers, Post-Treatment  0/10 - no pain  -MS     Row Name 08/20/19 1023 08/20/19 0458       Plan of Care Review    Plan of Care Reviewed With  patient  -MS  patient  -EG    Row Name 08/19/19 2317          Plan of Care Review    Plan of Care Reviewed With  patient  -EG     Row Name 08/20/19 1021          Physical Therapy Clinical Impression    Criteria for Skilled Interventions Met (PT Clinical Impression)  yes  -MS     Rehab Potential (PT Clinical Summary)  good, to achieve stated therapy goals  -MS     Row Name 08/20/19 1021          Positioning and Restraints    Pre-Treatment Position  in bed  -MS     Post Treatment Position  bed  -MS     In Bed  notified nsg;supine;call light within reach;encouraged to call for assist;exit alarm on;with family/caregiver All lines intact.  -MS       User Key  (r) = Recorded By, (t) = Taken By, (c) = Cosigned By    Initials Name Provider Type    FABIO  Lorraine Rodriguez, RN Registered Nurse    MS Karri Ford, PT Physical Therapist        Outcome Measures     Row Name 08/20/19 1023          How much help from another person do you currently need...    Turning from your back to your side while in flat bed without using bedrails?  4  -MS     Moving from lying on back to sitting on the side of a flat bed without bedrails?  3  -MS     Moving to and from a bed to a chair (including a wheelchair)?  3  -MS     Standing up from a chair using your arms (e.g., wheelchair, bedside chair)?  3  -MS     Climbing 3-5 steps with a railing?  3  -MS     To walk in hospital room?  3  -MS     AM-PAC 6 Clicks Score (PT)  19  -MS     Row Name 08/20/19 1023          Functional Assessment    Outcome Measure Options  AM-PAC 6 Clicks Basic Mobility (PT)  -MS       User Key  (r) = Recorded By, (t) = Taken By, (c) = Cosigned By    Initials Name Provider Type    MS Karri Ford, PT Physical Therapist        Physical Therapy Education     Title: PT OT SLP Therapies (Done)     Topic: Physical Therapy (Done)     Point: Mobility training (Done)     Learning Progress Summary           Patient Acceptance, E,D, VU,NR by MS at 8/20/2019 10:22 AM                   Point: Home exercise program (Done)     Learning Progress Summary           Patient Acceptance, E,D, VU,NR by MS at 8/20/2019 10:22 AM                   Point: Body mechanics (Done)     Learning Progress Summary           Patient Acceptance, E,D, VU,NR by MS at 8/20/2019 10:22 AM                   Point: Precautions (Done)     Learning Progress Summary           Patient Acceptance, E,D, VU,NR by MS at 8/20/2019 10:22 AM                               User Key     Initials Effective Dates Name Provider Type Discipline    MS 04/03/18 -  Karri Ford PT Physical Therapist PT              PT Recommendation and Plan  Planned Therapy Interventions (PT Eval): balance training, bed mobility training, gait training, home exercise  program, patient/family education, postural re-education, strengthening, transfer training  Outcome Summary/Treatment Plan (PT)  Anticipated Discharge Disposition (PT): home with assist, home with home health  Plan of Care Reviewed With: patient  Outcome Summary: Pt. will benefit from skilled inpt. P.T. to address her functional deficits and to assist pt. in regaining her maximum level of independence with functional mobility.      Time Calculation:   PT Charges     Row Name 08/20/19 1023             Time Calculation    Start Time  0925  -MS      Stop Time  0940  -MS      Time Calculation (min)  15 min  -MS      PT Received On  08/20/19  -MS      PT - Next Appointment  08/21/19  -MS      PT Goal Re-Cert Due Date  08/25/19  -MS         Time Calculation- PT    Total Timed Code Minutes- PT  13 minute(s)  -MS        User Key  (r) = Recorded By, (t) = Taken By, (c) = Cosigned By    Initials Name Provider Type    Karri Yo, PT Physical Therapist        Therapy Charges for Today     Code Description Service Date Service Provider Modifiers Qty    99018650464 HC PT EVAL LOW COMPLEXITY 1 8/20/2019 Karri Ford, PT GP 1    97571642505 HC PT THER PROC EA 15 MIN 8/20/2019 Karri Ford, PT GP 1    01464597002 HC PT THER SUPP EA 15 MIN 8/20/2019 Karri Ford, PT GP 1          PT G-Codes  Outcome Measure Options: AM-PAC 6 Clicks Basic Mobility (PT)  AM-PAC 6 Clicks Score (PT): 19    Karri Ford PT  8/20/2019

## 2019-08-20 NOTE — PROGRESS NOTES
Discharge Planning Assessment  [facility]     Patient Name: Yeni Jimenez  MRN: [id number]  Today's Date: 8/20/2019    Admit Date: 8/19/2019    Discharge Needs Assessment     Row Name 08/20/19 1716       Living Environment    Lives With  spouse    Current Living Arrangements  home/apartment/condo    Potentially Unsafe Housing Conditions  other (see comments) no concerns     Primary Care Provided by  self    Provides Primary Care For  no one    Family Caregiver if Needed  spouse;other (see comments) caregiver     Quality of Family Relationships  helpful;involved;supportive    Able to Return to Prior Arrangements  yes       Resource/Environmental Concerns    Resource/Environmental Concerns  none    Transportation Concerns  car, none       Transition Planning    Patient/Family Anticipates Transition to  home with help/services    Patient/Family Anticipated Services at Transition  none    Transportation Anticipated  family or friend will provide       Discharge Needs Assessment    Readmission Within the Last 30 Days  no previous admission in last 30 days    Concerns to be Addressed  no discharge needs identified;denies needs/concerns at this time    Equipment Currently Used at Home  walker, rolling    Anticipated Changes Related to Illness  none    Equipment Needed After Discharge  none    Outpatient/Agency/Support Group Needs  homecare agency    Discharge Facility/Level of Care Needs  home with home health        Discharge Plan     Row Name 08/20/19 1717       Plan    Plan  Home with caregivers and [facility]     Patient/Family in Agreement with Plan  yes    Plan Comments  CCP met with patient and patient's spouse at bedside. CCP role explained and discharge planning discussed. Face sheet verified and MATIAS noted. Patient's PCP is Dr. Moyer. Patient lives with her spouse, with chair lift access to the upstairs. Patient's bedroom and bathroom are on the main level. Patient has a walker she uses for mobility. Patient and  spouse have in home caregivers for 8-10 hours and day and some at night time. Patient has been to [facility] in the past. Patient's preferred pharmacy is [facility] in [place]. CCP discussed PT recommendations of home health; patient and spouse agreeable to [facility]. CCP will follow for any needs to arise. Jen Ermias CSW         Destination      No service coordination in this encounter.      Durable Medical Equipment      No service coordination in this encounter.      Dialysis/Infusion      No service coordination in this encounter.      Home Medical Care      Service Provider Request Status Selected Services Address Phone Number Fax Number    [facility] Pending - Request Sent N/A [address] [address] [phone]      Therapy      No service coordination in this encounter.      Community Resources      No service coordination in this encounter.          Demographic Summary     Row Name 08/20/19 1716       General Information    Admission Type  observation    Arrived From  emergency department    Required Notices Provided  Observation Status Notice    Referral Source  admission list    Reason for Consult  discharge planning    Preferred Language  English     Used During This Interaction  no        Functional Status     Row Name 08/20/19 1716       Functional Status    Usual Activity Tolerance  good    Current Activity Tolerance  good       Functional Status, IADL    Medications  assistive equipment    Meal Preparation  assistive equipment    Housekeeping  assistive equipment    Laundry  assistive equipment    Shopping  assistive equipment       Mental Status    General Appearance WDL  WDL       Mental Status Summary    Recent Changes in Mental Status/Cognitive Functioning  no changes        Psychosocial    No documentation.       Abuse/Neglect    No documentation.       Legal    No documentation.       Substance Abuse    No  documentation.       Patient Forms    No documentation.           SERINA Becerril

## 2019-08-21 VITALS
TEMPERATURE: 97.4 F | BODY MASS INDEX: 23.9 KG/M2 | HEIGHT: 61 IN | DIASTOLIC BLOOD PRESSURE: 79 MMHG | WEIGHT: 126.6 LBS | OXYGEN SATURATION: 94 % | RESPIRATION RATE: 18 BRPM | SYSTOLIC BLOOD PRESSURE: 142 MMHG | HEART RATE: 58 BPM

## 2019-08-21 LAB
ALBUMIN SERPL-MCNC: 3.7 G/DL (ref 3.5–5.2)
ALBUMIN/GLOB SERPL: 1.5 G/DL
ALP SERPL-CCNC: 52 U/L (ref 39–117)
ALT SERPL W P-5'-P-CCNC: 21 U/L (ref 1–33)
ANION GAP SERPL CALCULATED.3IONS-SCNC: 11.2 MMOL/L (ref 5–15)
AST SERPL-CCNC: 22 U/L (ref 1–32)
BASOPHILS # BLD AUTO: 0.06 10*3/MM3 (ref 0–0.2)
BASOPHILS NFR BLD AUTO: 0.8 % (ref 0–1.5)
BILIRUB SERPL-MCNC: 0.3 MG/DL (ref 0.2–1.2)
BUN BLD-MCNC: 14 MG/DL (ref 8–23)
BUN/CREAT SERPL: 28 (ref 7–25)
CALCIUM SPEC-SCNC: 9.2 MG/DL (ref 8.6–10.5)
CHLORIDE SERPL-SCNC: 103 MMOL/L (ref 98–107)
CO2 SERPL-SCNC: 25.8 MMOL/L (ref 22–29)
CREAT BLD-MCNC: 0.5 MG/DL (ref 0.57–1)
DEPRECATED RDW RBC AUTO: 51.6 FL (ref 37–54)
EOSINOPHIL # BLD AUTO: 0.2 10*3/MM3 (ref 0–0.4)
EOSINOPHIL NFR BLD AUTO: 2.6 % (ref 0.3–6.2)
ERYTHROCYTE [DISTWIDTH] IN BLOOD BY AUTOMATED COUNT: 14.3 % (ref 12.3–15.4)
GFR SERPL CREATININE-BSD FRML MDRD: 117 ML/MIN/1.73
GLOBULIN UR ELPH-MCNC: 2.4 GM/DL
GLUCOSE BLD-MCNC: 86 MG/DL (ref 65–99)
HCT VFR BLD AUTO: 40.4 % (ref 34–46.6)
HGB BLD-MCNC: 12.7 G/DL (ref 12–15.9)
IMM GRANULOCYTES # BLD AUTO: 0.09 10*3/MM3 (ref 0–0.05)
IMM GRANULOCYTES NFR BLD AUTO: 1.2 % (ref 0–0.5)
LYMPHOCYTES # BLD AUTO: 1.95 10*3/MM3 (ref 0.7–3.1)
LYMPHOCYTES NFR BLD AUTO: 25.7 % (ref 19.6–45.3)
MAGNESIUM SERPL-MCNC: 2 MG/DL (ref 1.6–2.4)
MCH RBC QN AUTO: 30.6 PG (ref 26.6–33)
MCHC RBC AUTO-ENTMCNC: 31.4 G/DL (ref 31.5–35.7)
MCV RBC AUTO: 97.3 FL (ref 79–97)
MONOCYTES # BLD AUTO: 0.87 10*3/MM3 (ref 0.1–0.9)
MONOCYTES NFR BLD AUTO: 11.4 % (ref 5–12)
NEUTROPHILS # BLD AUTO: 4.43 10*3/MM3 (ref 1.7–7)
NEUTROPHILS NFR BLD AUTO: 58.3 % (ref 42.7–76)
NRBC BLD AUTO-RTO: 0 /100 WBC (ref 0–0.2)
PLATELET # BLD AUTO: 213 10*3/MM3 (ref 140–450)
PMV BLD AUTO: 10.1 FL (ref 6–12)
POTASSIUM BLD-SCNC: 3.8 MMOL/L (ref 3.5–5.2)
PROT SERPL-MCNC: 6.1 G/DL (ref 6–8.5)
RBC # BLD AUTO: 4.15 10*6/MM3 (ref 3.77–5.28)
SODIUM BLD-SCNC: 140 MMOL/L (ref 136–145)
WBC NRBC COR # BLD: 7.6 10*3/MM3 (ref 3.4–10.8)

## 2019-08-21 PROCEDURE — 63710000001 DIPHENHYDRAMINE PER 50 MG: Performed by: INTERNAL MEDICINE

## 2019-08-21 PROCEDURE — 63710000001 PREDNISONE PER 5 MG: Performed by: INTERNAL MEDICINE

## 2019-08-21 PROCEDURE — 99213 OFFICE O/P EST LOW 20 MIN: CPT | Performed by: INTERNAL MEDICINE

## 2019-08-21 PROCEDURE — 83735 ASSAY OF MAGNESIUM: CPT | Performed by: HOSPITALIST

## 2019-08-21 PROCEDURE — 85025 COMPLETE CBC W/AUTO DIFF WBC: CPT | Performed by: HOSPITALIST

## 2019-08-21 PROCEDURE — G0378 HOSPITAL OBSERVATION PER HR: HCPCS

## 2019-08-21 PROCEDURE — 80053 COMPREHEN METABOLIC PANEL: CPT | Performed by: HOSPITALIST

## 2019-08-21 RX ADMIN — LEVOTHYROXINE SODIUM 112 MCG: 112 TABLET ORAL at 06:23

## 2019-08-21 RX ADMIN — PREDNISONE 10 MG: 10 TABLET ORAL at 09:36

## 2019-08-21 RX ADMIN — VITAMIN D, TAB 1000IU (100/BT) 1000 UNITS: 25 TAB at 09:35

## 2019-08-21 RX ADMIN — NALOXEGOL OXALATE 25 MG: 25 TABLET, FILM COATED ORAL at 09:37

## 2019-08-21 RX ADMIN — SODIUM CHLORIDE, PRESERVATIVE FREE 3 ML: 5 INJECTION INTRAVENOUS at 09:37

## 2019-08-21 RX ADMIN — POLYETHYLENE GLYCOL 3350 17 G: 17 POWDER, FOR SOLUTION ORAL at 09:36

## 2019-08-21 RX ADMIN — ACETAMINOPHEN AND CODEINE PHOSPHATE 2 TABLET: 300; 30 TABLET ORAL at 03:49

## 2019-08-21 RX ADMIN — AMLODIPINE BESYLATE 5 MG: 5 TABLET ORAL at 09:36

## 2019-08-21 RX ADMIN — PANTOPRAZOLE SODIUM 40 MG: 40 TABLET, DELAYED RELEASE ORAL at 06:23

## 2019-08-21 RX ADMIN — LOSARTAN POTASSIUM 50 MG: 50 TABLET, FILM COATED ORAL at 09:36

## 2019-08-21 RX ADMIN — CALCIUM CARBONATE-VITAMIN D TAB 500 MG-200 UNIT 1000 MG: 500-200 TAB at 09:35

## 2019-08-21 RX ADMIN — OXYCODONE HYDROCHLORIDE AND ACETAMINOPHEN 1000 MG: 500 TABLET ORAL at 09:34

## 2019-08-21 RX ADMIN — SENNOSIDES 17.2 MG: 8.6 TABLET, FILM COATED ORAL at 09:34

## 2019-08-21 RX ADMIN — ACETAMINOPHEN AND CODEINE PHOSPHATE 2 TABLET: 300; 30 TABLET ORAL at 11:25

## 2019-08-21 RX ADMIN — LEFLUNOMIDE 20 MG: 20 TABLET ORAL at 09:36

## 2019-08-21 RX ADMIN — DIPHENHYDRAMINE HYDROCHLORIDE 25 MG: 25 CAPSULE ORAL at 07:29

## 2019-08-21 NOTE — PLAN OF CARE
Problem: Skin Injury Risk (Adult)  Goal: Skin Health and Integrity  Outcome: Ongoing (interventions implemented as appropriate)      Problem: Patient Care Overview  Goal: Plan of Care Review  Outcome: Ongoing (interventions implemented as appropriate)   08/21/19 1118   Coping/Psychosocial   Plan of Care Reviewed With patient;spouse   Plan of Care Review   Progress improving   OTHER   Outcome Summary pt vss, no complaints f pain. benadryl given this am for difficulty swallowing. pt resting well. discharge home this afternoon. will continue to monitor.      Goal: Individualization and Mutuality  Outcome: Ongoing (interventions implemented as appropriate)      Problem: Fall Risk (Adult)  Goal: Absence of Fall  Outcome: Ongoing (interventions implemented as appropriate)      Problem: Pain, Chronic (Adult)  Goal: Acceptable Pain/Comfort Level and Functional Ability  Outcome: Ongoing (interventions implemented as appropriate)

## 2019-08-21 NOTE — PLAN OF CARE
Problem: Skin Injury Risk (Adult)  Goal: Identify Related Risk Factors and Signs and Symptoms  Outcome: Outcome(s) achieved Date Met: 08/21/19    Goal: Skin Health and Integrity  Outcome: Ongoing (interventions implemented as appropriate)      Problem: Patient Care Overview  Goal: Plan of Care Review  Outcome: Ongoing (interventions implemented as appropriate)   08/21/19 0442   Coping/Psychosocial   Plan of Care Reviewed With patient   Plan of Care Review   Progress no change   OTHER   Outcome Summary Pt rested intermittently during the night with c/o of pain x2. On rm air, vss- will continue to monitor. 8/21/19 @0446       Problem: Fall Risk (Adult)  Goal: Identify Related Risk Factors and Signs and Symptoms  Outcome: Outcome(s) achieved Date Met: 08/21/19    Goal: Absence of Fall  Outcome: Ongoing (interventions implemented as appropriate)      Problem: Pain, Chronic (Adult)  Goal: Identify Related Risk Factors and Signs and Symptoms  Outcome: Outcome(s) achieved Date Met: 08/21/19    Goal: Acceptable Pain/Comfort Level and Functional Ability  Outcome: Ongoing (interventions implemented as appropriate)

## 2019-08-21 NOTE — PROGRESS NOTES
Hospital Follow Up    LOS:  LOS: 0 days   Patient Name: Yeni Jimenez  Age/Sex: 86 y.o. female  : 1932  MRN: [id number]    Day of Service: 19   Length of Stay: 0  Encounter Provider: Castro Krishnamurthy MD  Place of Service: [facility]  Patient Care Team:  Arian Moyer MD as PCP - General  Arian Moyer MD as PCP - Family Medicine  [place], Irena Hunt MD as PCP - Claims Attributed    Subjective:     Chief Complaint: Weakness/hypertension.    Interval History: Patient is doing better today clinically.  She says her strength has improved.  Her blood pressure is also improved some but still is remaining in the 170s 180s frequently.  Her potassium level has also improved.    Objective:     Objective:  Temp:  [97.4 °F (36.3 °C)-98.4 °F (36.9 °C)] 97.4 °F (36.3 °C)  Heart Rate:  [58-60] 58  Resp:  [16-18] 18  BP: (142-182)/(79-92) 142/79     Intake/Output Summary (Last 24 hours) at 2019 1122  Last data filed at 2019 1300  Gross per 24 hour   Intake 320 ml   Output --   Net 320 ml     Body mass index is 23.92 kg/m².      19  1448 19   Weight: 54.4 kg (120 lb) 57.4 kg (126 lb 9.6 oz) (nurse notified)     Weight change:       Physical Exam:   General : Alert, cooperative, in no acute distress.  Neuro: Alert,cooperative and oriented.  Lungs: CTAB. Normal respiratory effort and rate.  CV: Regular rate and rhythm, normal S1 and S2, no murmurs, gallops or rubs.  ABD: Soft, nontender, nondistended. Positive bowel sounds.  Extr: No edema or cyanosis, moves all extremities.    Lab Review:   Results from last 7 days   Lab Units 19  0324 19  0344   SODIUM mmol/L 140 141   POTASSIUM mmol/L 3.8 4.6   CHLORIDE mmol/L 103 107   CO2 mmol/L 25.8 23.4   BUN mg/dL 14 23   CREATININE mg/dL 0.50* 0.60   GLUCOSE mg/dL 86 97   CALCIUM mg/dL 9.2 9.3   AST (SGOT) U/L 22 22   ALT (SGPT) U/L 21 26     Results from last 7 days    Lab Units 08/19/19  1545   TROPONIN T ng/mL <0.010     Results from last 7 days   Lab Units 08/21/19  0324 08/20/19  0344   WBC 10*3/mm3 7.60 8.31   HEMOGLOBIN g/dL 12.7 11.7*   HEMATOCRIT % 40.4 36.6   PLATELETS 10*3/mm3 213 209         Results from last 7 days   Lab Units 08/21/19  0324 08/20/19  0344   MAGNESIUM mg/dL 2.0 2.2           Invalid input(s): LDLCALC      Results from last 7 days   Lab Units 08/20/19  0344   TSH mIU/mL 0.266*     I reviewed the patient's new clinical results.  I personally viewed and interpreted the patient's EKG  Current Medications:   Scheduled Meds:  amLODIPine 5 mg Oral BID   calcium-vitamin D 1,000 mg Oral TID   cholecalciferol 1,000 Units Oral Daily   leflunomide 20 mg Oral Daily   levothyroxine 112 mcg Oral Q AM   losartan 50 mg Oral Q12H   metoprolol succinate XL 25 mg Oral Nightly   Naloxegol Oxalate 25 mg Oral Daily   pantoprazole 40 mg Oral BID AC   polyethylene glycol 17 g Oral Every Other Day   predniSONE 10 mg Oral Daily   senna 2 tablet Oral BID   sodium chloride 3 mL Intravenous Q12H   traZODone 50 mg Oral Nightly   ascorbic acid 1,000 mg Oral Daily     Continuous Infusions:     Allergies:  Allergies   Allergen Reactions   • Marigold  [[place] Officinalis (Marigold)]    • Sulfa Antibiotics    • Sulfites Swelling     Made her face swells and caused her not to breath       Assessment:       Hypokalemia    Rheumatoid arthritis (CMS/HCC)    Hypertension    Hypocalcemia    Generalized weakness    Hypomagnesemia        Plan:   1.  Hypertension.  Patient's blood pressure is a little bit better than when she presented to the hospital when it was well over 200.  I do think we need to do better control of her blood pressure.  Her heart rate is running in the 50s so I would not increase her beta-blocker.  She is on losartan 100 mg a day as well as amlodipine 5 mg twice daily.  When patient presented she was hypokalemic.  Obviously a diuretic would be beneficial but we have to  be careful with her potassium level.  I therefore would recommend Spironolactone 25 mg a day to see if it helps her blood pressure.  When I told her that I would observe her for 24 more hours to make sure her potassium was okay at her blood pressure continues to improve patient said she has to go home.  Unfortunately she initially asked me my opinion and when I told her my opinion she did not like it and stated my opinion for me.  I did everything I could to say that were just trying to care for her but she unfortunately was very unpleasant to me.  2.  Hypokalemia  3.  Rheumatoid arthritis on prednisone this is obviously contributing to her blood pressure issues.        Castro Krishnamurthy MD  08/21/19  11:22 AM       Addendum discharge summary was reviewed after I dictated the note.  I would suggest Spironolactone as noted above for her blood pressure does not improve.

## 2019-08-21 NOTE — DISCHARGE SUMMARY
Patient Name: Yeni Jimenez  : 1932  MRN: [id number]    Date of Admission: 2019  Date of Discharge:  2019  Primary Care Physician: Arian Moyer MD      Chief Complaint:   Hypertension      Discharge Diagnoses     Active Hospital Problems    Diagnosis  POA   • **Hypokalemia [E87.6]  Yes   • Rheumatoid arthritis (CMS/HCC) [M06.9]  Yes   • Hypertension [I10]  Yes   • Hypocalcemia [E83.51]  Yes   • Generalized weakness [R53.1]  Yes   • Hypomagnesemia [E83.42]  Yes      Resolved Hospital Problems   No resolved problems to display.        Hospital Course     Ms. Jimenez is a 86 y.o. female non-smoker with a history of chronic pain due to RA, chronic steroid therapy, hypoT4, HTN, and anxiety who presented to [facility] initially complaining of generalized weakness and SBP in the 200's.  Please see the admitting history and physical for further details.  She was found to have very elevated BP and multiple electrolyte abnormalities and was admitted to the hospital for further evaluation and treatment.  She is much improved. BPs are better. Electrolyte abnormalities are resolved. She was seen by Gerry. This AM Dr. Krishnamurthy recommended pt stay another night for observation, however pt and  are very reluctant to do this. Pt's  is a retired physician and can monitor her BP at home. She has appointment with Dr. Woodruff (Gerry) in September. I feel she is safe to go home with [facility] and pt's longtime caregiver. On current home meds her BPs look good. I wonder if there was some confusion of meds at home to explain her elevated BP and multiple electrolyte abnormalities? Home health should be of great assistance in this case.      Day of Discharge     Feeling much better today.     Physical Exam:  Temp:  [97.4 °F (36.3 °C)-98.4 °F (36.9 °C)] 97.4 °F (36.3 °C)  Heart Rate:  [58-60] 58  Resp:  [16-18] 18  BP: (142-182)/(79-92) 142/79  Body mass index is 23.92 kg/m².  Physical  Exam  General Appearance:  Comfortable and in no acute distress.    Vital signs are normal.  No fever.    Output: Producing urine.    HEENT: Normal HEENT exam.    Lungs:  Normal effort and normal respiratory rate.  Breath sounds clear to auscultation.    Heart: Normal rate.  Regular rhythm.    Abdomen: Abdomen is soft.  Bowel sounds are normal.   There is no abdominal tenderness.     Extremities: There is no dependent edema.    Pulses: Distal pulses are intact.    Neurological: Patient is alert and oriented to person, place and time.    Skin:  Warm and dry.  No rash.  Consultants     Consult Orders (all) (From admission, onward)    Start     Ordered    08/20/19 1133  Inpatient Cardiology Consult  Once     Specialty:  Cardiology  Provider:  Benito Woodruff III, MD    08/20/19 1133    08/19/19 1824  [facility] (on-call MD unless specified) Details  Once     Specialty:  Hospitalist  Provider:  (Not yet assigned)    08/19/19 1823        Procedures     * Surgery not found *    Imaging Results (all)     None               Results for orders placed in visit on 12/20/13   SCANNED - ECHOCARDIOGRAM     Pertinent Labs     Results from last 7 days   Lab Units 08/21/19  0324 08/20/19  0344 08/19/19  1545   WBC 10*3/mm3 7.60 8.31 8.47   HEMOGLOBIN g/dL 12.7 11.7* 13.3   PLATELETS 10*3/mm3 213 209 206     Results from last 7 days   Lab Units 08/21/19  0324 08/20/19  0344 08/19/19  1545   SODIUM mmol/L 140 141 143   POTASSIUM mmol/L 3.8 4.6 2.7*   CHLORIDE mmol/L 103 107 115*   CO2 mmol/L 25.8 23.4 21.3*   BUN mg/dL 14 23 12   CREATININE mg/dL 0.50* 0.60 0.29*   GLUCOSE mg/dL 86 97 77   Estimated Creatinine Clearance: 41.1 mL/min (A) (by C-G formula based on SCr of 0.5 mg/dL (L)).  Results from last 7 days   Lab Units 08/21/19  0324 08/20/19  0344 08/19/19  1545   ALBUMIN g/dL 3.70 3.40* 2.40*   BILIRUBIN mg/dL 0.3 0.2 0.2   ALK PHOS U/L 52 54 45   AST (SGOT) U/L 22 22 32   ALT (SGPT) U/L 21 26 25     Results from last 7 days   Lab Units  08/21/19  0324 08/20/19  0344 08/19/19  1545   CALCIUM mg/dL 9.2 9.3 6.3*   ALBUMIN g/dL 3.70 3.40* 2.40*   MAGNESIUM mg/dL 2.0 2.2 1.5*   PHOSPHORUS mg/dL  --  3.0 1.8*       Results from last 7 days   Lab Units 08/19/19  1545   TROPONIN T ng/mL <0.010           Invalid input(s): LDLCALC        Test Results Pending at Discharge   None    Discharge Details        Discharge Medications      Changes to Medications      Instructions Start Date   polyethylene glycol packet  Commonly known as:  MIRALAX  What changed:  when to take this   17 g, Oral, Daily         Continue These Medications      Instructions Start Date   acetaminophen 160 MG/5ML solution  Commonly known as:  TYLENOL   640 mg, Oral, Every 4 Hours PRN, PATIENT TOLERATES ROSSI FLAVOR ONLY      ALPRAZolam 1 MG tablet  Commonly known as:  XANAX   1 mg, Oral, 5 Times Daily PRN      amLODIPine 5 MG tablet  Commonly known as:  NORVASC   5 mg, Oral, 2 Times Daily      ascorbic acid 1000 MG tablet  Commonly known as:  VITAMIN C   1,000 mg, Oral, Daily      codeine 30 MG tablet   60 mg, Oral, Every 4 Hours PRN      diphenhydrAMINE 25 mg capsule  Commonly known as:  BENADRYL   25 mg, Oral, Every 6 Hours PRN      ipratropium-albuterol 0.5-2.5 mg/3 ml nebulizer  Commonly known as:  DUO-NEB   3 mL, Inhalation, 3 Times Daily PRN      lansoprazole 30 MG capsule  Commonly known as:  PREVACID   30 mg, Oral, 2 Times Daily      leflunomide 20 MG tablet  Commonly known as:  ARAVA   20 mg, Oral, Daily      levothyroxine 112 MCG tablet  Commonly known as:  SYNTHROID, LEVOTHROID   112 mcg, Oral, Daily      lidocaine 5 %  Commonly known as:  LIDODERM   1 patch, Transdermal, Daily PRN, Remove & Discard patch within 12 hours or as directed by MD      losartan 50 MG tablet  Commonly known as:  COZAAR   50 mg, Oral, 2 Times Daily      metoprolol succinate XL 25 MG 24 hr tablet  Commonly known as:  TOPROL-XL   25 mg, Oral, Every Evening      MOVANTIK 25 MG tablet  Generic drug:   Naloxegol Oxalate   25 mg, Oral, Daily      MYRBETRIQ 50 MG tablet sustained-release 24 hour 24 hr tablet  Generic drug:  Mirabegron ER   50 mg, Oral, Daily, PATIENT HAS NOT STARTED THIS MEDICATION      PB-Hyoscy-Atropine-Scopolamine 16.2 MG per tablet  Commonly known as:  DONNATAL   1 tablet, Oral, Every 8 Hours PRN      predniSONE 5 MG tablet  Commonly known as:  DELTASONE   10 mg, Oral, Daily      PRESERVISION/LUTEIN capsule   1 capsule, Oral, Daily      multivitamin with minerals tablet tablet   1 tablet, Oral, Daily      salsalate 500 MG tablet  Commonly known as:  DISALCID   1,000 mg, Oral, 3 Times Daily PRN      senna 8.6 MG tablet tablet  Commonly known as:  SENOKOT   2 tablets, Oral, 2 Times Daily      traZODone 50 MG tablet  Commonly known as:  DESYREL   50 mg, Oral, Nightly      Vitamin D-3 1000 units capsule   1,000 Units, Oral, Daily             Allergies   Allergen Reactions   • Marigold  [Calendula Officinalis (Marigold)]    • Sulfa Antibiotics    • Sulfites Swelling     Made her face swells and caused her not to breath         Discharge Disposition:  Home-Health Care Svc    Discharge Diet:  Diet Order   Procedures   • Diet Regular       Discharge Activity:   as tolerated    CODE STATUS:    Code Status and Medical Interventions:   Ordered at: 08/19/19 2033     Code Status:    CPR     Medical Interventions (Level of Support Prior to Arrest):    Full       Future Appointments   Date Time Provider Department Center   9/24/2019  2:30 PM Benito Woodruff III, MD [facility] None     Additional Instructions for the Follow-ups that You Need to Schedule     Ambulatory Referral to Home Health   As directed      Face to Face Visit Date:  8/21/2019    Follow-up Provider for Plan of Care?:  I treated the patient in an acute care facility and will not continue treatment after discharge.    Follow-up Provider:  VIPUL HERNANDEZ [[id number]]    Reason/Clinical Findings:  Chronic pain due to RA, accelerated HTN     Describe mobility limitations that make leaving home difficult:  Requires the assistance of another to leave the home    Nursing/Therapeutic Services Requested:  Skilled Nursing Physical Therapy    Skilled nursing orders:  Medication education    PT orders:  Therapeutic exercise    Frequency:  1 Week 1         Discharge Follow-up with PCP   As directed       Currently Documented PCP:    Vipul Moyer MD    PCP Phone Number:    [phone]     Follow Up Details:  Dr. Moyer (PCP) in 1 week         Discharge Follow-up with Specified Provider: Dr. Woodruff (Card)   As directed      To:  Dr. Woodruff (Card)    Follow Up Details:  September 24th           Follow-up Information     Vipul Moyer MD .    Specialty:  Internal Medicine  Why:  Dr. Moyer (PCP) in 1 week  Contact information:  [address]  [facility] [place] [address]  [phone]             [facility] [place] .    Specialty:  Home Health Services  Contact information:  [address]  [place] [address]                 Additional Instructions for the Follow-ups that You Need to Schedule     Ambulatory Referral to Home Health   As directed      Face to Face Visit Date:  8/21/2019    Follow-up Provider for Plan of Care?:  I treated the patient in an acute care facility and will not continue treatment after discharge.    Follow-up Provider:  VIPUL MOYER [[id number]]    Reason/Clinical Findings:  Chronic pain due to RA, accelerated HTN    Describe mobility limitations that make leaving home difficult:  Requires the assistance of another to leave the home    Nursing/Therapeutic Services Requested:  Skilled Nursing Physical Therapy    Skilled nursing orders:  Medication education    PT orders:  Therapeutic exercise    Frequency:  1 Week 1         Discharge Follow-up with PCP   As directed       Currently Documented PCP:    Vipul Moyer MD    PCP  Phone Number:    [phone]     Follow Up Details:  Dr. Moyer (PCP) in 1 week         Discharge Follow-up with Specified Provider: Dr. Woodruff (Card)   As directed      To:  Dr. Woodruff (Card)    Follow Up Details:  September 24th           Time Spent on Discharge:  Greater than 30 minutes      Mat Long MD  [facility]  08/21/19  11:01 AM

## 2019-08-24 ENCOUNTER — APPOINTMENT (OUTPATIENT)
Dept: GENERAL RADIOLOGY | Facility: HOSPITAL | Age: 84
End: 2019-08-24

## 2019-08-24 ENCOUNTER — HOSPITAL ENCOUNTER (INPATIENT)
Facility: HOSPITAL | Age: 84
LOS: 3 days | Discharge: HOME HEALTH | End: 2019-08-27
Attending: EMERGENCY MEDICINE | Admitting: INTERNAL MEDICINE

## 2019-08-24 DIAGNOSIS — A41.9 SEPSIS, DUE TO UNSPECIFIED ORGANISM: ICD-10-CM

## 2019-08-24 DIAGNOSIS — J18.9 HCAP (HEALTHCARE-ASSOCIATED PNEUMONIA): Primary | ICD-10-CM

## 2019-08-24 DIAGNOSIS — E83.39 HYPOPHOSPHATEMIA: ICD-10-CM

## 2019-08-24 LAB
ALBUMIN SERPL-MCNC: 3.2 G/DL (ref 3.5–5.2)
ALBUMIN/GLOB SERPL: 1.2 G/DL
ALP SERPL-CCNC: 42 U/L (ref 39–117)
ALT SERPL W P-5'-P-CCNC: 15 U/L (ref 1–33)
ANION GAP SERPL CALCULATED.3IONS-SCNC: 9.9 MMOL/L (ref 5–15)
AST SERPL-CCNC: 19 U/L (ref 1–32)
B PARAPERT DNA SPEC QL NAA+PROBE: NOT DETECTED
B PERT DNA SPEC QL NAA+PROBE: NOT DETECTED
BASOPHILS # BLD AUTO: 0.1 10*3/MM3 (ref 0–0.2)
BASOPHILS NFR BLD AUTO: 0.6 % (ref 0–1.5)
BILIRUB SERPL-MCNC: 0.4 MG/DL (ref 0.2–1.2)
BUN BLD-MCNC: 18 MG/DL (ref 8–23)
BUN/CREAT SERPL: 33.3 (ref 7–25)
C PNEUM DNA NPH QL NAA+NON-PROBE: NOT DETECTED
CALCIUM SPEC-SCNC: 8.6 MG/DL (ref 8.6–10.5)
CHLORIDE SERPL-SCNC: 103 MMOL/L (ref 98–107)
CK SERPL-CCNC: 55 U/L (ref 20–180)
CO2 SERPL-SCNC: 26.1 MMOL/L (ref 22–29)
CREAT BLD-MCNC: 0.54 MG/DL (ref 0.57–1)
D-LACTATE SERPL-SCNC: 1.8 MMOL/L (ref 0.5–2)
DEPRECATED RDW RBC AUTO: 53.1 FL (ref 37–54)
EOSINOPHIL # BLD AUTO: 0.1 10*3/MM3 (ref 0–0.4)
EOSINOPHIL NFR BLD AUTO: 0.6 % (ref 0.3–6.2)
ERYTHROCYTE [DISTWIDTH] IN BLOOD BY AUTOMATED COUNT: 14.3 % (ref 12.3–15.4)
FLUAV H1 2009 PAND RNA NPH QL NAA+PROBE: NOT DETECTED
FLUAV H1 HA GENE NPH QL NAA+PROBE: NOT DETECTED
FLUAV H3 RNA NPH QL NAA+PROBE: NOT DETECTED
FLUAV SUBTYP SPEC NAA+PROBE: NOT DETECTED
FLUBV RNA ISLT QL NAA+PROBE: NOT DETECTED
GFR SERPL CREATININE-BSD FRML MDRD: 107 ML/MIN/1.73
GLOBULIN UR ELPH-MCNC: 2.6 GM/DL
GLUCOSE BLD-MCNC: 91 MG/DL (ref 65–99)
HADV DNA SPEC NAA+PROBE: NOT DETECTED
HCOV 229E RNA SPEC QL NAA+PROBE: NOT DETECTED
HCOV HKU1 RNA SPEC QL NAA+PROBE: NOT DETECTED
HCOV NL63 RNA SPEC QL NAA+PROBE: NOT DETECTED
HCOV OC43 RNA SPEC QL NAA+PROBE: NOT DETECTED
HCT VFR BLD AUTO: 44.3 % (ref 34–46.6)
HGB BLD-MCNC: 13.8 G/DL (ref 12–15.9)
HMPV RNA NPH QL NAA+NON-PROBE: NOT DETECTED
HPIV1 RNA SPEC QL NAA+PROBE: NOT DETECTED
HPIV2 RNA SPEC QL NAA+PROBE: NOT DETECTED
HPIV3 RNA NPH QL NAA+PROBE: NOT DETECTED
HPIV4 P GENE NPH QL NAA+PROBE: NOT DETECTED
IMM GRANULOCYTES # BLD AUTO: 0.08 10*3/MM3 (ref 0–0.05)
IMM GRANULOCYTES NFR BLD AUTO: 0.5 % (ref 0–0.5)
LYMPHOCYTES # BLD AUTO: 1.49 10*3/MM3 (ref 0.7–3.1)
LYMPHOCYTES NFR BLD AUTO: 8.5 % (ref 19.6–45.3)
M PNEUMO IGG SER IA-ACNC: NOT DETECTED
MAGNESIUM SERPL-MCNC: 1.8 MG/DL (ref 1.6–2.4)
MCH RBC QN AUTO: 31.3 PG (ref 26.6–33)
MCHC RBC AUTO-ENTMCNC: 31.2 G/DL (ref 31.5–35.7)
MCV RBC AUTO: 100.5 FL (ref 79–97)
MONOCYTES # BLD AUTO: 1.28 10*3/MM3 (ref 0.1–0.9)
MONOCYTES NFR BLD AUTO: 7.3 % (ref 5–12)
NEUTROPHILS # BLD AUTO: 14.58 10*3/MM3 (ref 1.7–7)
NEUTROPHILS NFR BLD AUTO: 82.5 % (ref 42.7–76)
NRBC BLD AUTO-RTO: 0 /100 WBC (ref 0–0.2)
NT-PROBNP SERPL-MCNC: 360.9 PG/ML (ref 5–1800)
PHOSPHATE SERPL-MCNC: 1.9 MG/DL (ref 2.5–4.5)
PLATELET # BLD AUTO: 208 10*3/MM3 (ref 140–450)
PMV BLD AUTO: 10 FL (ref 6–12)
POTASSIUM BLD-SCNC: 4.3 MMOL/L (ref 3.5–5.2)
PROCALCITONIN SERPL-MCNC: 0.23 NG/ML (ref 0.1–0.25)
PROT SERPL-MCNC: 5.8 G/DL (ref 6–8.5)
RBC # BLD AUTO: 4.41 10*6/MM3 (ref 3.77–5.28)
RHINOVIRUS RNA SPEC NAA+PROBE: NOT DETECTED
RSV RNA NPH QL NAA+NON-PROBE: NOT DETECTED
SODIUM BLD-SCNC: 139 MMOL/L (ref 136–145)
TROPONIN T SERPL-MCNC: <0.01 NG/ML (ref 0–0.03)
WBC NRBC COR # BLD: 17.63 10*3/MM3 (ref 3.4–10.8)

## 2019-08-24 PROCEDURE — 85025 COMPLETE CBC W/AUTO DIFF WBC: CPT | Performed by: EMERGENCY MEDICINE

## 2019-08-24 PROCEDURE — 84100 ASSAY OF PHOSPHORUS: CPT | Performed by: EMERGENCY MEDICINE

## 2019-08-24 PROCEDURE — 25010000002 VANCOMYCIN 10 G RECONSTITUTED SOLUTION: Performed by: EMERGENCY MEDICINE

## 2019-08-24 PROCEDURE — 80053 COMPREHEN METABOLIC PANEL: CPT | Performed by: EMERGENCY MEDICINE

## 2019-08-24 PROCEDURE — 0100U HC BIOFIRE FILMARRAY RESP PANEL 2: CPT | Performed by: EMERGENCY MEDICINE

## 2019-08-24 PROCEDURE — 71046 X-RAY EXAM CHEST 2 VIEWS: CPT

## 2019-08-24 PROCEDURE — 87899 AGENT NOS ASSAY W/OPTIC: CPT | Performed by: INTERNAL MEDICINE

## 2019-08-24 PROCEDURE — 94799 UNLISTED PULMONARY SVC/PX: CPT

## 2019-08-24 PROCEDURE — 99285 EMERGENCY DEPT VISIT HI MDM: CPT

## 2019-08-24 PROCEDURE — 87040 BLOOD CULTURE FOR BACTERIA: CPT | Performed by: EMERGENCY MEDICINE

## 2019-08-24 PROCEDURE — 93005 ELECTROCARDIOGRAM TRACING: CPT | Performed by: EMERGENCY MEDICINE

## 2019-08-24 PROCEDURE — 93010 ELECTROCARDIOGRAM REPORT: CPT | Performed by: INTERNAL MEDICINE

## 2019-08-24 PROCEDURE — 83735 ASSAY OF MAGNESIUM: CPT | Performed by: EMERGENCY MEDICINE

## 2019-08-24 PROCEDURE — 83605 ASSAY OF LACTIC ACID: CPT | Performed by: EMERGENCY MEDICINE

## 2019-08-24 PROCEDURE — 25010000002 CEFEPIME PER 500 MG: Performed by: EMERGENCY MEDICINE

## 2019-08-24 PROCEDURE — 84484 ASSAY OF TROPONIN QUANT: CPT | Performed by: EMERGENCY MEDICINE

## 2019-08-24 PROCEDURE — 94640 AIRWAY INHALATION TREATMENT: CPT

## 2019-08-24 PROCEDURE — 82550 ASSAY OF CK (CPK): CPT | Performed by: EMERGENCY MEDICINE

## 2019-08-24 PROCEDURE — 25010000002 AZITHROMYCIN PER 500 MG: Performed by: EMERGENCY MEDICINE

## 2019-08-24 PROCEDURE — 87081 CULTURE SCREEN ONLY: CPT | Performed by: INTERNAL MEDICINE

## 2019-08-24 PROCEDURE — 83880 ASSAY OF NATRIURETIC PEPTIDE: CPT | Performed by: EMERGENCY MEDICINE

## 2019-08-24 PROCEDURE — 84145 PROCALCITONIN (PCT): CPT | Performed by: EMERGENCY MEDICINE

## 2019-08-24 RX ORDER — ACETAMINOPHEN 650 MG/1
650 SUPPOSITORY RECTAL EVERY 4 HOURS PRN
Status: DISCONTINUED | OUTPATIENT
Start: 2019-08-24 | End: 2019-08-27 | Stop reason: HOSPADM

## 2019-08-24 RX ORDER — LOSARTAN POTASSIUM 50 MG/1
50 TABLET ORAL
Status: DISCONTINUED | OUTPATIENT
Start: 2019-08-25 | End: 2019-08-27 | Stop reason: HOSPADM

## 2019-08-24 RX ORDER — ALPRAZOLAM 0.5 MG/1
1 TABLET ORAL
Status: DISCONTINUED | OUTPATIENT
Start: 2019-08-24 | End: 2019-08-27 | Stop reason: HOSPADM

## 2019-08-24 RX ORDER — ACETAMINOPHEN 500 MG
1000 TABLET ORAL ONCE
Status: COMPLETED | OUTPATIENT
Start: 2019-08-24 | End: 2019-08-24

## 2019-08-24 RX ORDER — ACETAMINOPHEN 325 MG/1
650 TABLET ORAL EVERY 4 HOURS PRN
Status: DISCONTINUED | OUTPATIENT
Start: 2019-08-24 | End: 2019-08-27 | Stop reason: HOSPADM

## 2019-08-24 RX ORDER — METOPROLOL SUCCINATE 25 MG/1
25 TABLET, EXTENDED RELEASE ORAL EVERY EVENING
Status: DISCONTINUED | OUTPATIENT
Start: 2019-08-25 | End: 2019-08-27 | Stop reason: HOSPADM

## 2019-08-24 RX ORDER — IPRATROPIUM BROMIDE AND ALBUTEROL SULFATE 2.5; .5 MG/3ML; MG/3ML
3 SOLUTION RESPIRATORY (INHALATION)
Status: DISCONTINUED | OUTPATIENT
Start: 2019-08-24 | End: 2019-08-27 | Stop reason: HOSPADM

## 2019-08-24 RX ORDER — PANTOPRAZOLE SODIUM 40 MG/1
40 TABLET, DELAYED RELEASE ORAL
Status: DISCONTINUED | OUTPATIENT
Start: 2019-08-25 | End: 2019-08-27 | Stop reason: HOSPADM

## 2019-08-24 RX ORDER — IPRATROPIUM BROMIDE AND ALBUTEROL SULFATE 2.5; .5 MG/3ML; MG/3ML
3 SOLUTION RESPIRATORY (INHALATION) ONCE
Status: COMPLETED | OUTPATIENT
Start: 2019-08-24 | End: 2019-08-24

## 2019-08-24 RX ORDER — SODIUM CHLORIDE 0.9 % (FLUSH) 0.9 %
10 SYRINGE (ML) INJECTION AS NEEDED
Status: DISCONTINUED | OUTPATIENT
Start: 2019-08-24 | End: 2019-08-27 | Stop reason: HOSPADM

## 2019-08-24 RX ORDER — SODIUM CHLORIDE 0.9 % (FLUSH) 0.9 %
3-10 SYRINGE (ML) INJECTION AS NEEDED
Status: DISCONTINUED | OUTPATIENT
Start: 2019-08-24 | End: 2019-08-27 | Stop reason: HOSPADM

## 2019-08-24 RX ORDER — SODIUM CHLORIDE 0.9 % (FLUSH) 0.9 %
3 SYRINGE (ML) INJECTION EVERY 12 HOURS SCHEDULED
Status: DISCONTINUED | OUTPATIENT
Start: 2019-08-24 | End: 2019-08-27 | Stop reason: HOSPADM

## 2019-08-24 RX ORDER — PREDNISONE 10 MG/1
10 TABLET ORAL DAILY
Status: DISCONTINUED | OUTPATIENT
Start: 2019-08-25 | End: 2019-08-27 | Stop reason: HOSPADM

## 2019-08-24 RX ORDER — LEVOTHYROXINE SODIUM 112 UG/1
112 TABLET ORAL
Status: DISCONTINUED | OUTPATIENT
Start: 2019-08-25 | End: 2019-08-27 | Stop reason: HOSPADM

## 2019-08-24 RX ORDER — OXYBUTYNIN CHLORIDE 5 MG/1
5 TABLET, EXTENDED RELEASE ORAL DAILY
Status: DISCONTINUED | OUTPATIENT
Start: 2019-08-25 | End: 2019-08-27 | Stop reason: HOSPADM

## 2019-08-24 RX ORDER — TRAZODONE HYDROCHLORIDE 50 MG/1
50 TABLET ORAL NIGHTLY
Status: DISCONTINUED | OUTPATIENT
Start: 2019-08-25 | End: 2019-08-27 | Stop reason: HOSPADM

## 2019-08-24 RX ORDER — ACETAMINOPHEN 160 MG/5ML
650 SOLUTION ORAL EVERY 4 HOURS PRN
Status: DISCONTINUED | OUTPATIENT
Start: 2019-08-24 | End: 2019-08-27 | Stop reason: HOSPADM

## 2019-08-24 RX ORDER — SENNOSIDES 8.6 MG
2 TABLET ORAL 2 TIMES DAILY
Status: DISCONTINUED | OUTPATIENT
Start: 2019-08-25 | End: 2019-08-27 | Stop reason: HOSPADM

## 2019-08-24 RX ORDER — LIDOCAINE 50 MG/G
1 PATCH TOPICAL DAILY PRN
Status: DISCONTINUED | OUTPATIENT
Start: 2019-08-24 | End: 2019-08-27 | Stop reason: HOSPADM

## 2019-08-24 RX ORDER — AMLODIPINE BESYLATE 5 MG/1
5 TABLET ORAL 2 TIMES DAILY
Status: DISCONTINUED | OUTPATIENT
Start: 2019-08-25 | End: 2019-08-27 | Stop reason: HOSPADM

## 2019-08-24 RX ORDER — ONDANSETRON 2 MG/ML
4 INJECTION INTRAMUSCULAR; INTRAVENOUS EVERY 6 HOURS PRN
Status: DISCONTINUED | OUTPATIENT
Start: 2019-08-24 | End: 2019-08-27 | Stop reason: HOSPADM

## 2019-08-24 RX ORDER — CODEINE SULFATE 30 MG/1
60 TABLET ORAL EVERY 4 HOURS PRN
Status: DISCONTINUED | OUTPATIENT
Start: 2019-08-24 | End: 2019-08-27 | Stop reason: HOSPADM

## 2019-08-24 RX ADMIN — POTASSIUM & SODIUM PHOSPHATES POWDER PACK 280-160-250 MG 2 PACKET: 280-160-250 PACK at 18:23

## 2019-08-24 RX ADMIN — AZITHROMYCIN 500 MG: 500 INJECTION, POWDER, LYOPHILIZED, FOR SOLUTION INTRAVENOUS at 17:34

## 2019-08-24 RX ADMIN — ACETAMINOPHEN 1000 MG: 500 TABLET, FILM COATED ORAL at 15:47

## 2019-08-24 RX ADMIN — CEFEPIME HYDROCHLORIDE 2 G: 2 INJECTION, POWDER, FOR SOLUTION INTRAVENOUS at 16:42

## 2019-08-24 RX ADMIN — IPRATROPIUM BROMIDE AND ALBUTEROL SULFATE 3 ML: 2.5; .5 SOLUTION RESPIRATORY (INHALATION) at 19:49

## 2019-08-24 RX ADMIN — IPRATROPIUM BROMIDE AND ALBUTEROL SULFATE 3 ML: 2.5; .5 SOLUTION RESPIRATORY (INHALATION) at 13:55

## 2019-08-24 RX ADMIN — POLYETHYLENE GLYCOL 3350 17 G: 17 POWDER, FOR SOLUTION ORAL at 23:50

## 2019-08-24 RX ADMIN — SODIUM CHLORIDE, POTASSIUM CHLORIDE, SODIUM LACTATE AND CALCIUM CHLORIDE 1000 ML: 600; 310; 30; 20 INJECTION, SOLUTION INTRAVENOUS at 14:14

## 2019-08-24 RX ADMIN — VANCOMYCIN HYDROCHLORIDE 1250 MG: 10 INJECTION, POWDER, LYOPHILIZED, FOR SOLUTION INTRAVENOUS at 18:22

## 2019-08-25 PROBLEM — A41.9 SEPSIS DUE TO PNEUMONIA (HCC): Status: ACTIVE | Noted: 2019-08-25

## 2019-08-25 PROBLEM — J18.9 SEPSIS DUE TO PNEUMONIA (HCC): Status: ACTIVE | Noted: 2019-08-25

## 2019-08-25 LAB
ANION GAP SERPL CALCULATED.3IONS-SCNC: 10 MMOL/L (ref 5–15)
BASOPHILS # BLD AUTO: 0.07 10*3/MM3 (ref 0–0.2)
BASOPHILS NFR BLD AUTO: 0.5 % (ref 0–1.5)
BUN BLD-MCNC: 12 MG/DL (ref 8–23)
BUN/CREAT SERPL: 26.1 (ref 7–25)
CALCIUM SPEC-SCNC: 8.1 MG/DL (ref 8.6–10.5)
CHLORIDE SERPL-SCNC: 104 MMOL/L (ref 98–107)
CO2 SERPL-SCNC: 24 MMOL/L (ref 22–29)
CREAT BLD-MCNC: 0.46 MG/DL (ref 0.57–1)
DEPRECATED RDW RBC AUTO: 51.8 FL (ref 37–54)
EOSINOPHIL # BLD AUTO: 0.27 10*3/MM3 (ref 0–0.4)
EOSINOPHIL NFR BLD AUTO: 1.9 % (ref 0.3–6.2)
ERYTHROCYTE [DISTWIDTH] IN BLOOD BY AUTOMATED COUNT: 14.5 % (ref 12.3–15.4)
GFR SERPL CREATININE-BSD FRML MDRD: 129 ML/MIN/1.73
GLUCOSE BLD-MCNC: 100 MG/DL (ref 65–99)
HCT VFR BLD AUTO: 34.4 % (ref 34–46.6)
HGB BLD-MCNC: 11 G/DL (ref 12–15.9)
IMM GRANULOCYTES # BLD AUTO: 0.06 10*3/MM3 (ref 0–0.05)
IMM GRANULOCYTES NFR BLD AUTO: 0.4 % (ref 0–0.5)
L PNEUMO1 AG UR QL IA: NEGATIVE
LYMPHOCYTES # BLD AUTO: 1.6 10*3/MM3 (ref 0.7–3.1)
LYMPHOCYTES NFR BLD AUTO: 11.2 % (ref 19.6–45.3)
MCH RBC QN AUTO: 31.5 PG (ref 26.6–33)
MCHC RBC AUTO-ENTMCNC: 32 G/DL (ref 31.5–35.7)
MCV RBC AUTO: 98.6 FL (ref 79–97)
MONOCYTES # BLD AUTO: 1.14 10*3/MM3 (ref 0.1–0.9)
MONOCYTES NFR BLD AUTO: 8 % (ref 5–12)
NEUTROPHILS # BLD AUTO: 11.19 10*3/MM3 (ref 1.7–7)
NEUTROPHILS NFR BLD AUTO: 78 % (ref 42.7–76)
NRBC BLD AUTO-RTO: 0 /100 WBC (ref 0–0.2)
PHOSPHATE SERPL-MCNC: 2.8 MG/DL (ref 2.5–4.5)
PLATELET # BLD AUTO: 164 10*3/MM3 (ref 140–450)
PMV BLD AUTO: 9.8 FL (ref 6–12)
POTASSIUM BLD-SCNC: 3.9 MMOL/L (ref 3.5–5.2)
RBC # BLD AUTO: 3.49 10*6/MM3 (ref 3.77–5.28)
S PNEUM AG SPEC QL LA: NEGATIVE
SODIUM BLD-SCNC: 138 MMOL/L (ref 136–145)
WBC NRBC COR # BLD: 14.33 10*3/MM3 (ref 3.4–10.8)

## 2019-08-25 PROCEDURE — 25010000002 VANCOMYCIN 750 MG RECONSTITUTED SOLUTION: Performed by: INTERNAL MEDICINE

## 2019-08-25 PROCEDURE — 25010000002 ENOXAPARIN PER 10 MG: Performed by: INTERNAL MEDICINE

## 2019-08-25 PROCEDURE — 97110 THERAPEUTIC EXERCISES: CPT

## 2019-08-25 PROCEDURE — 63710000001 PREDNISONE PER 5 MG: Performed by: INTERNAL MEDICINE

## 2019-08-25 PROCEDURE — 25010000002 CEFEPIME PER 500 MG: Performed by: INTERNAL MEDICINE

## 2019-08-25 PROCEDURE — 85025 COMPLETE CBC W/AUTO DIFF WBC: CPT | Performed by: INTERNAL MEDICINE

## 2019-08-25 PROCEDURE — 97162 PT EVAL MOD COMPLEX 30 MIN: CPT

## 2019-08-25 PROCEDURE — 84100 ASSAY OF PHOSPHORUS: CPT | Performed by: INTERNAL MEDICINE

## 2019-08-25 PROCEDURE — 80048 BASIC METABOLIC PNL TOTAL CA: CPT | Performed by: INTERNAL MEDICINE

## 2019-08-25 PROCEDURE — 94799 UNLISTED PULMONARY SVC/PX: CPT

## 2019-08-25 RX ADMIN — METOPROLOL SUCCINATE 25 MG: 25 TABLET, FILM COATED, EXTENDED RELEASE ORAL at 18:11

## 2019-08-25 RX ADMIN — OXYBUTYNIN CHLORIDE 5 MG: 5 TABLET, EXTENDED RELEASE ORAL at 08:15

## 2019-08-25 RX ADMIN — ACETAMINOPHEN 650 MG: 325 TABLET, FILM COATED ORAL at 05:18

## 2019-08-25 RX ADMIN — ENOXAPARIN SODIUM 40 MG: 40 INJECTION SUBCUTANEOUS at 08:18

## 2019-08-25 RX ADMIN — CEFEPIME HYDROCHLORIDE 2 G: 2 INJECTION, POWDER, FOR SOLUTION INTRAVENOUS at 18:11

## 2019-08-25 RX ADMIN — AMLODIPINE BESYLATE 5 MG: 5 TABLET ORAL at 00:49

## 2019-08-25 RX ADMIN — TRAZODONE HYDROCHLORIDE 50 MG: 50 TABLET ORAL at 21:05

## 2019-08-25 RX ADMIN — TRAZODONE HYDROCHLORIDE 50 MG: 50 TABLET ORAL at 00:49

## 2019-08-25 RX ADMIN — NALOXEGOL OXALATE 25 MG: 12.5 TABLET, FILM COATED ORAL at 08:15

## 2019-08-25 RX ADMIN — CODEINE SULFATE 60 MG: 30 TABLET ORAL at 00:50

## 2019-08-25 RX ADMIN — PANTOPRAZOLE SODIUM 40 MG: 40 TABLET, DELAYED RELEASE ORAL at 08:18

## 2019-08-25 RX ADMIN — CODEINE SULFATE 60 MG: 30 TABLET ORAL at 12:41

## 2019-08-25 RX ADMIN — LOSARTAN POTASSIUM 50 MG: 50 TABLET, FILM COATED ORAL at 08:13

## 2019-08-25 RX ADMIN — IPRATROPIUM BROMIDE AND ALBUTEROL SULFATE 3 ML: 2.5; .5 SOLUTION RESPIRATORY (INHALATION) at 19:31

## 2019-08-25 RX ADMIN — CEFEPIME HYDROCHLORIDE 2 G: 2 INJECTION, POWDER, FOR SOLUTION INTRAVENOUS at 05:19

## 2019-08-25 RX ADMIN — CODEINE SULFATE 60 MG: 30 TABLET ORAL at 18:11

## 2019-08-25 RX ADMIN — LEVOTHYROXINE SODIUM 112 MCG: 112 TABLET ORAL at 05:20

## 2019-08-25 RX ADMIN — PREDNISONE 10 MG: 10 TABLET ORAL at 08:14

## 2019-08-25 RX ADMIN — IPRATROPIUM BROMIDE AND ALBUTEROL SULFATE 3 ML: 2.5; .5 SOLUTION RESPIRATORY (INHALATION) at 10:26

## 2019-08-25 RX ADMIN — ACETAMINOPHEN 650 MG: 325 TABLET, FILM COATED ORAL at 00:49

## 2019-08-25 RX ADMIN — CODEINE SULFATE 60 MG: 30 TABLET ORAL at 05:18

## 2019-08-25 RX ADMIN — PANTOPRAZOLE SODIUM 40 MG: 40 TABLET, DELAYED RELEASE ORAL at 18:11

## 2019-08-25 RX ADMIN — SENNOSIDES 17.2 MG: 8.6 TABLET, FILM COATED ORAL at 08:14

## 2019-08-25 RX ADMIN — AMLODIPINE BESYLATE 5 MG: 5 TABLET ORAL at 08:13

## 2019-08-25 RX ADMIN — SENNOSIDES 17.2 MG: 8.6 TABLET, FILM COATED ORAL at 00:49

## 2019-08-25 RX ADMIN — AMLODIPINE BESYLATE 5 MG: 5 TABLET ORAL at 21:05

## 2019-08-25 RX ADMIN — ALPRAZOLAM 1 MG: 0.5 TABLET ORAL at 21:05

## 2019-08-25 RX ADMIN — SODIUM CHLORIDE, PRESERVATIVE FREE 3 ML: 5 INJECTION INTRAVENOUS at 13:25

## 2019-08-25 RX ADMIN — CODEINE SULFATE 60 MG: 30 TABLET ORAL at 21:22

## 2019-08-25 RX ADMIN — VANCOMYCIN HYDROCHLORIDE 750 MG: 750 INJECTION, POWDER, LYOPHILIZED, FOR SOLUTION INTRAVENOUS at 10:00

## 2019-08-26 PROBLEM — J15.6 PNEUMONIA DUE TO GRAM-NEGATIVE BACTERIA (HCC): Status: ACTIVE | Noted: 2019-08-26

## 2019-08-26 LAB
ANION GAP SERPL CALCULATED.3IONS-SCNC: 9.9 MMOL/L (ref 5–15)
BUN BLD-MCNC: 10 MG/DL (ref 8–23)
BUN/CREAT SERPL: 21.3 (ref 7–25)
CALCIUM SPEC-SCNC: 8.2 MG/DL (ref 8.6–10.5)
CHLORIDE SERPL-SCNC: 107 MMOL/L (ref 98–107)
CO2 SERPL-SCNC: 25.1 MMOL/L (ref 22–29)
CREAT BLD-MCNC: 0.47 MG/DL (ref 0.57–1)
DEPRECATED RDW RBC AUTO: 51.8 FL (ref 37–54)
ERYTHROCYTE [DISTWIDTH] IN BLOOD BY AUTOMATED COUNT: 14.2 % (ref 12.3–15.4)
GFR SERPL CREATININE-BSD FRML MDRD: 126 ML/MIN/1.73
GLUCOSE BLD-MCNC: 95 MG/DL (ref 65–99)
HCT VFR BLD AUTO: 32.1 % (ref 34–46.6)
HGB BLD-MCNC: 10.3 G/DL (ref 12–15.9)
MAGNESIUM SERPL-MCNC: 2 MG/DL (ref 1.6–2.4)
MCH RBC QN AUTO: 31.6 PG (ref 26.6–33)
MCHC RBC AUTO-ENTMCNC: 32.1 G/DL (ref 31.5–35.7)
MCV RBC AUTO: 98.5 FL (ref 79–97)
MRSA SPEC QL CULT: NORMAL
PLATELET # BLD AUTO: 168 10*3/MM3 (ref 140–450)
PMV BLD AUTO: 10.1 FL (ref 6–12)
POTASSIUM BLD-SCNC: 3.3 MMOL/L (ref 3.5–5.2)
RBC # BLD AUTO: 3.26 10*6/MM3 (ref 3.77–5.28)
SODIUM BLD-SCNC: 142 MMOL/L (ref 136–145)
WBC NRBC COR # BLD: 10.61 10*3/MM3 (ref 3.4–10.8)

## 2019-08-26 PROCEDURE — 63710000001 PREDNISONE PER 5 MG: Performed by: INTERNAL MEDICINE

## 2019-08-26 PROCEDURE — 83735 ASSAY OF MAGNESIUM: CPT | Performed by: INTERNAL MEDICINE

## 2019-08-26 PROCEDURE — 94799 UNLISTED PULMONARY SVC/PX: CPT

## 2019-08-26 PROCEDURE — 25010000002 CEFEPIME PER 500 MG: Performed by: INTERNAL MEDICINE

## 2019-08-26 PROCEDURE — 85027 COMPLETE CBC AUTOMATED: CPT | Performed by: INTERNAL MEDICINE

## 2019-08-26 PROCEDURE — 25010000002 ENOXAPARIN PER 10 MG: Performed by: INTERNAL MEDICINE

## 2019-08-26 PROCEDURE — 92610 EVALUATE SWALLOWING FUNCTION: CPT | Performed by: SPEECH-LANGUAGE PATHOLOGIST

## 2019-08-26 PROCEDURE — 80048 BASIC METABOLIC PNL TOTAL CA: CPT | Performed by: INTERNAL MEDICINE

## 2019-08-26 RX ORDER — CHOLECALCIFEROL (VITAMIN D3) 125 MCG
5 CAPSULE ORAL NIGHTLY PRN
Status: DISCONTINUED | OUTPATIENT
Start: 2019-08-26 | End: 2019-08-27 | Stop reason: HOSPADM

## 2019-08-26 RX ORDER — POTASSIUM CHLORIDE 1.5 G/1.77G
40 POWDER, FOR SOLUTION ORAL AS NEEDED
Status: DISCONTINUED | OUTPATIENT
Start: 2019-08-26 | End: 2019-08-27 | Stop reason: HOSPADM

## 2019-08-26 RX ORDER — POTASSIUM CHLORIDE 7.45 MG/ML
10 INJECTION INTRAVENOUS
Status: DISCONTINUED | OUTPATIENT
Start: 2019-08-26 | End: 2019-08-27 | Stop reason: HOSPADM

## 2019-08-26 RX ORDER — GUAIFENESIN AND DEXTROMETHORPHAN HYDROBROMIDE 600; 30 MG/1; MG/1
2 TABLET, EXTENDED RELEASE ORAL 2 TIMES DAILY
Status: DISCONTINUED | OUTPATIENT
Start: 2019-08-26 | End: 2019-08-26

## 2019-08-26 RX ORDER — POTASSIUM CHLORIDE 750 MG/1
40 CAPSULE, EXTENDED RELEASE ORAL AS NEEDED
Status: DISCONTINUED | OUTPATIENT
Start: 2019-08-26 | End: 2019-08-27 | Stop reason: HOSPADM

## 2019-08-26 RX ADMIN — PREDNISONE 10 MG: 10 TABLET ORAL at 11:36

## 2019-08-26 RX ADMIN — POTASSIUM CHLORIDE 40 MEQ: 750 CAPSULE, EXTENDED RELEASE ORAL at 11:36

## 2019-08-26 RX ADMIN — METOPROLOL SUCCINATE 25 MG: 25 TABLET, FILM COATED, EXTENDED RELEASE ORAL at 17:28

## 2019-08-26 RX ADMIN — CODEINE SULFATE 60 MG: 30 TABLET ORAL at 06:15

## 2019-08-26 RX ADMIN — ALPRAZOLAM 1 MG: 0.5 TABLET ORAL at 22:01

## 2019-08-26 RX ADMIN — ENOXAPARIN SODIUM 40 MG: 40 INJECTION SUBCUTANEOUS at 11:36

## 2019-08-26 RX ADMIN — CODEINE SULFATE 60 MG: 30 TABLET ORAL at 22:00

## 2019-08-26 RX ADMIN — ALPRAZOLAM 1 MG: 0.5 TABLET ORAL at 01:34

## 2019-08-26 RX ADMIN — TRAZODONE HYDROCHLORIDE 50 MG: 50 TABLET ORAL at 21:03

## 2019-08-26 RX ADMIN — SODIUM CHLORIDE, PRESERVATIVE FREE 3 ML: 5 INJECTION INTRAVENOUS at 21:04

## 2019-08-26 RX ADMIN — PANTOPRAZOLE SODIUM 40 MG: 40 TABLET, DELAYED RELEASE ORAL at 11:37

## 2019-08-26 RX ADMIN — Medication 650 MG: at 17:28

## 2019-08-26 RX ADMIN — AMLODIPINE BESYLATE 5 MG: 5 TABLET ORAL at 11:37

## 2019-08-26 RX ADMIN — CODEINE SULFATE 60 MG: 30 TABLET ORAL at 17:28

## 2019-08-26 RX ADMIN — LOSARTAN POTASSIUM 50 MG: 50 TABLET, FILM COATED ORAL at 11:36

## 2019-08-26 RX ADMIN — AMLODIPINE BESYLATE 5 MG: 5 TABLET ORAL at 21:04

## 2019-08-26 RX ADMIN — CEFEPIME HYDROCHLORIDE 2 G: 2 INJECTION, POWDER, FOR SOLUTION INTRAVENOUS at 04:01

## 2019-08-26 RX ADMIN — Medication 650 MG: at 21:54

## 2019-08-26 RX ADMIN — ALPRAZOLAM 1 MG: 0.5 TABLET ORAL at 17:28

## 2019-08-26 RX ADMIN — LEVOTHYROXINE SODIUM 112 MCG: 112 TABLET ORAL at 11:37

## 2019-08-26 RX ADMIN — NALOXEGOL OXALATE 25 MG: 12.5 TABLET, FILM COATED ORAL at 11:36

## 2019-08-26 RX ADMIN — CEFEPIME HYDROCHLORIDE 2 G: 2 INJECTION, POWDER, FOR SOLUTION INTRAVENOUS at 17:28

## 2019-08-26 RX ADMIN — POLYETHYLENE GLYCOL 3350 17 G: 17 POWDER, FOR SOLUTION ORAL at 11:37

## 2019-08-26 RX ADMIN — IPRATROPIUM BROMIDE AND ALBUTEROL SULFATE 3 ML: 2.5; .5 SOLUTION RESPIRATORY (INHALATION) at 12:49

## 2019-08-26 RX ADMIN — SODIUM CHLORIDE, PRESERVATIVE FREE 3 ML: 5 INJECTION INTRAVENOUS at 11:38

## 2019-08-26 RX ADMIN — CODEINE SULFATE 60 MG: 30 TABLET ORAL at 11:36

## 2019-08-26 RX ADMIN — PANTOPRAZOLE SODIUM 40 MG: 40 TABLET, DELAYED RELEASE ORAL at 17:28

## 2019-08-26 RX ADMIN — OXYBUTYNIN CHLORIDE 5 MG: 5 TABLET, EXTENDED RELEASE ORAL at 11:36

## 2019-08-26 RX ADMIN — IPRATROPIUM BROMIDE AND ALBUTEROL SULFATE 3 ML: 2.5; .5 SOLUTION RESPIRATORY (INHALATION) at 15:50

## 2019-08-27 VITALS
HEART RATE: 66 BPM | TEMPERATURE: 97.9 F | OXYGEN SATURATION: 92 % | HEIGHT: 61 IN | WEIGHT: 103 LBS | SYSTOLIC BLOOD PRESSURE: 165 MMHG | BODY MASS INDEX: 19.45 KG/M2 | DIASTOLIC BLOOD PRESSURE: 68 MMHG | RESPIRATION RATE: 20 BRPM

## 2019-08-27 PROCEDURE — 25010000002 CEFEPIME PER 500 MG: Performed by: INTERNAL MEDICINE

## 2019-08-27 PROCEDURE — 94799 UNLISTED PULMONARY SVC/PX: CPT

## 2019-08-27 PROCEDURE — 63710000001 PREDNISONE PER 5 MG: Performed by: INTERNAL MEDICINE

## 2019-08-27 PROCEDURE — 25010000002 ENOXAPARIN PER 10 MG: Performed by: INTERNAL MEDICINE

## 2019-08-27 RX ORDER — AMOXICILLIN AND CLAVULANATE POTASSIUM 250; 62.5 MG/5ML; MG/5ML
500 POWDER, FOR SUSPENSION ORAL 2 TIMES DAILY
Qty: 90 ML | Refills: 0 | Status: SHIPPED | OUTPATIENT
Start: 2019-08-27 | End: 2019-09-01

## 2019-08-27 RX ADMIN — ENOXAPARIN SODIUM 40 MG: 40 INJECTION SUBCUTANEOUS at 08:39

## 2019-08-27 RX ADMIN — SODIUM CHLORIDE, PRESERVATIVE FREE 3 ML: 5 INJECTION INTRAVENOUS at 08:57

## 2019-08-27 RX ADMIN — OXYBUTYNIN CHLORIDE 5 MG: 5 TABLET, EXTENDED RELEASE ORAL at 08:38

## 2019-08-27 RX ADMIN — CODEINE SULFATE 60 MG: 30 TABLET ORAL at 13:49

## 2019-08-27 RX ADMIN — SENNOSIDES 17.2 MG: 8.6 TABLET, FILM COATED ORAL at 08:34

## 2019-08-27 RX ADMIN — AMLODIPINE BESYLATE 5 MG: 5 TABLET ORAL at 08:38

## 2019-08-27 RX ADMIN — LOSARTAN POTASSIUM 50 MG: 50 TABLET, FILM COATED ORAL at 08:37

## 2019-08-27 RX ADMIN — IPRATROPIUM BROMIDE AND ALBUTEROL SULFATE 3 ML: 2.5; .5 SOLUTION RESPIRATORY (INHALATION) at 08:16

## 2019-08-27 RX ADMIN — PANTOPRAZOLE SODIUM 40 MG: 40 TABLET, DELAYED RELEASE ORAL at 08:34

## 2019-08-27 RX ADMIN — PREDNISONE 10 MG: 10 TABLET ORAL at 08:38

## 2019-08-27 RX ADMIN — CODEINE SULFATE 60 MG: 30 TABLET ORAL at 08:34

## 2019-08-27 RX ADMIN — CEFEPIME HYDROCHLORIDE 2 G: 2 INJECTION, POWDER, FOR SOLUTION INTRAVENOUS at 05:05

## 2019-08-27 RX ADMIN — ALPRAZOLAM 1 MG: 0.5 TABLET ORAL at 04:28

## 2019-08-27 RX ADMIN — NALOXEGOL OXALATE 25 MG: 12.5 TABLET, FILM COATED ORAL at 08:36

## 2019-08-27 RX ADMIN — POTASSIUM CHLORIDE 40 MEQ: 750 CAPSULE, EXTENDED RELEASE ORAL at 13:49

## 2019-08-27 RX ADMIN — ALPRAZOLAM 1 MG: 0.5 TABLET ORAL at 13:49

## 2019-08-27 RX ADMIN — IPRATROPIUM BROMIDE AND ALBUTEROL SULFATE 3 ML: 2.5; .5 SOLUTION RESPIRATORY (INHALATION) at 13:33

## 2019-08-28 ENCOUNTER — READMISSION MANAGEMENT (OUTPATIENT)
Dept: CALL CENTER | Facility: HOSPITAL | Age: 84
End: 2019-08-28

## 2019-08-28 NOTE — OUTREACH NOTE
Prep Survey      Responses   Facility patient discharged from?  [place]   Is patient eligible?  Yes   Discharge diagnosis  sepsis r/t HCAP, RA on daily prednisone   Does the patient have one of the following disease processes/diagnoses(primary or secondary)?  Sepsis   Does the patient have Home health ordered?  Yes   What is the Home health agency?   [facility]    Is there a DME ordered?  No   Prep survey completed?  Yes          Teresa Booker RN

## 2019-08-29 ENCOUNTER — READMISSION MANAGEMENT (OUTPATIENT)
Dept: CALL CENTER | Facility: HOSPITAL | Age: 84
End: 2019-08-29

## 2019-08-29 LAB
BACTERIA SPEC AEROBE CULT: NORMAL
BACTERIA SPEC AEROBE CULT: NORMAL

## 2019-08-29 NOTE — OUTREACH NOTE
Sepsis Week 1 Survey      Responses   Facility patient discharged from?  [place]   Does the patient have one of the following disease processes/diagnoses(primary or secondary)?  Sepsis   Is there a successful TCM telephone encounter documented?  No   Week 1 attempt successful?  No   Unsuccessful attempts  Attempt 1          Mariel Albrecht RN

## 2019-08-30 ENCOUNTER — READMISSION MANAGEMENT (OUTPATIENT)
Dept: CALL CENTER | Facility: HOSPITAL | Age: 84
End: 2019-08-30

## 2019-08-30 NOTE — OUTREACH NOTE
Sepsis Week 1 Survey      Responses   Facility patient discharged from?  [place]   Does the patient have one of the following disease processes/diagnoses(primary or secondary)?  Sepsis   Is there a successful TCM telephone encounter documented?  No   Week 1 attempt successful?  Yes   Call start time  1028   Call end time  1054   Discharge diagnosis  sepsis r/t HCAP, RA on daily prednisone   Is patient permission given to speak with other caregiver?  Yes   List who call center can speak with     Person spoke with today (if not patient) and relationship     Meds reviewed with patient/caregiver?  Yes   Is the patient having any side effects they believe may be caused by any medication additions or changes?  No   Does the patient have all medications related to this admission filled (includes all antibiotics, inhalers, nebulizers,steroids,etc.)  Yes   Is the patient taking all medications as directed (includes completed medication regime)?  Yes   Does the patient have a primary care provider?   Yes   Does the patient have an appointment with their PCP within 7 days of discharge?  Yes   Has the patient kept scheduled appointments due by today?  N/A   What is the Home health agency?   [facility]    Has home health visited the patient within 72 hours of discharge?  Yes   Home health comments   is supposed to come out today and I have called and spoken to Nathan  at [facility] and he will be having [facility] come to see pt today.   Psychosocial issues?  No   Did the patient receive a copy of their discharge instructions?  Yes   Nursing interventions  Reviewed instructions with patient   What is the patient's perception of their health status since discharge?  New symptoms unrelated to diagnosis [Pt has excoriation from being incontinent with urine.]   Nursing interventions  Nurse provided patient education   Is the patient/caregiver able to teach back Sepsis?  S - Shivering,fever or very cold, E -  Extreme pain or generalized discomfort (worst ever,especially abdomen), S - Sleepy, difficult to arouse,confused   Nursing interventions  Nurse provided reassurance to patient   Is patient/caregiver able to teach back steps to recovery at home?  Set small, achievable goals for return to baseline health, Rest and regain strength   Is the patient/caregiver able to teach back signs and symptoms of worsening condition:  Fever, Rapid heart rate (>90)   Is the patient/caregiver able to teach back the hierarchy of who to call/visit for symptoms/problems? PCP, Specialist, Home health nurse, Urgent Care, ED, 911  Yes   Additional teach back comments  I spoke with pt and her spose. Pt is better but having some issues with excoriation and itching in vaginal area. I have contacted HH and they are supposed to see her today.    Week 1 call completed?  Yes          Melonie Shrestha RN

## 2019-09-06 ENCOUNTER — READMISSION MANAGEMENT (OUTPATIENT)
Dept: CALL CENTER | Facility: HOSPITAL | Age: 84
End: 2019-09-06

## 2019-09-06 NOTE — OUTREACH NOTE
Sepsis Week 2 Survey      Responses   Facility patient discharged from?  [place]   Does the patient have one of the following disease processes/diagnoses(primary or secondary)?  Sepsis   Week 2 attempt successful?  No   Unsuccessful attempts  Attempt 1          Jami Vazquez RN

## 2019-09-09 ENCOUNTER — READMISSION MANAGEMENT (OUTPATIENT)
Dept: CALL CENTER | Facility: HOSPITAL | Age: 84
End: 2019-09-09

## 2019-09-09 NOTE — OUTREACH NOTE
Sepsis Week 2 Survey      Responses   Facility patient discharged from?  [facility]   Does the patient have one of the following disease processes/diagnoses(primary or secondary)?  Sepsis   Week 2 attempt successful?  Yes   Call start time  1212   Rescheduled  Rescheduled-pt requested [pt and  heading out to 's appt]   Discharge diagnosis  sepsis r/t HCAP, RA on daily prednisone   Is patient permission given to speak with other caregiver?  Yes   List who call center can speak with     Person spoke with today (if not patient) and relationship            Melonie Shrestha RN

## 2019-09-10 ENCOUNTER — READMISSION MANAGEMENT (OUTPATIENT)
Dept: CALL CENTER | Facility: HOSPITAL | Age: 84
End: 2019-09-10

## 2019-09-10 NOTE — OUTREACH NOTE
Sepsis Week 2 Survey      Responses   Facility patient discharged from?  [place]   Does the patient have one of the following disease processes/diagnoses(primary or secondary)?  Sepsis   Week 2 attempt successful?  Yes   Call start time  1530   Call end time  1535   General alerts for this patient  Spouse is an MD.   Discharge diagnosis  sepsis r/t HCAP, RA on daily prednisone   Is patient permission given to speak with other caregiver?  Yes   Person spoke with today (if not patient) and relationship     Meds reviewed with patient/caregiver?  Yes   Is the patient having any side effects they believe may be caused by any medication additions or changes?  No   Does the patient have all medications related to this admission filled (includes all antibiotics, inhalers, nebulizers,steroids,etc.)  Yes   Is the patient taking all medications as directed (includes completed medication regime)?  Yes   Does the patient have a primary care provider?   Yes   Does the patient have an appointment with their PCP within 7 days of discharge?  Yes   Has the patient kept scheduled appointments due by today?  Yes   What is the Home health agency?   [facility]    Has home health visited the patient within 72 hours of discharge?  Yes   Psychosocial issues?  No   Did the patient receive a copy of their discharge instructions?  Yes   Nursing interventions  Reviewed instructions with patient   What is the patient's perception of their health status since discharge?  Improving   Nursing interventions  Nurse provided patient education   Is the patient/caregiver able to teach back Sepsis?  S - Shivering,fever or very cold, E - Extreme pain or generalized discomfort (worst ever,especially abdomen), S - Sleepy, difficult to arouse,confused   Nursing interventions  Nurse provided reassurance to patient   Is patient/caregiver able to teach back steps to recovery at home?  Set small, achievable goals for return to baseline health, Rest and  regain strength   Is the patient/caregiver able to teach back signs and symptoms of worsening condition:  Fever, Rapid heart rate (>90), Hyperthermia, Shortness of breath/rapid respiratory rate, Altered mental status(confusion/coma)   Is the patient/caregiver able to teach back the hierarchy of who to call/visit for symptoms/problems? PCP, Specialist, Home health nurse, Urgent Care, ED, 911  Yes   Week 2 call completed?  Yes          Gilberto Carver RN

## 2019-09-16 ENCOUNTER — HOSPITAL ENCOUNTER (OUTPATIENT)
Dept: GENERAL RADIOLOGY | Facility: HOSPITAL | Age: 84
Discharge: HOME | End: 2019-09-16
Admitting: INTERNAL MEDICINE

## 2019-09-16 DIAGNOSIS — R05.9 COUGH: ICD-10-CM

## 2019-09-16 PROCEDURE — 71046 X-RAY EXAM CHEST 2 VIEWS: CPT

## 2019-09-16 NOTE — NURSING NOTE
Spoke to Dr. Small regarding results of CXR. He said he spoke with Dr. Moyer and patient may go. Home per self, no distress.

## 2019-09-18 ENCOUNTER — READMISSION MANAGEMENT (OUTPATIENT)
Dept: CALL CENTER | Facility: HOSPITAL | Age: 84
End: 2019-09-18

## 2019-09-18 NOTE — OUTREACH NOTE
Sepsis Week 3 Survey      Responses   Facility patient discharged from?  [place]   Does the patient have one of the following disease processes/diagnoses(primary or secondary)?  Sepsis   Week 3 attempt successful?  No   Unsuccessful attempts  Attempt 1          Fany Lopez RN

## 2019-09-19 ENCOUNTER — READMISSION MANAGEMENT (OUTPATIENT)
Dept: CALL CENTER | Facility: HOSPITAL | Age: 84
End: 2019-09-19

## 2019-09-19 NOTE — OUTREACH NOTE
Sepsis Week 3 Survey      Responses   Facility patient discharged from?  [place]   Does the patient have one of the following disease processes/diagnoses(primary or secondary)?  Sepsis   Week 3 attempt successful?  Yes   Call start time  0927   Call end time  0929   Discharge diagnosis  sepsis r/t HCAP, RA on daily prednisone   Is patient permission given to speak with other caregiver?  Yes   List who call center can speak with     Person spoke with today (if not patient) and relationship     Meds reviewed with patient/caregiver?  Yes   Is the patient taking all medications as directed (includes completed medication regime)?  Yes   Has the patient kept scheduled appointments due by today?  Yes   What is the patient's perception of their health status since discharge?  Improving   Additional teach back comments   states she is doing well and has finished her antibx   Week 3 call completed?  Yes          Melonie Shrestha RN

## 2019-09-27 ENCOUNTER — READMISSION MANAGEMENT (OUTPATIENT)
Dept: CALL CENTER | Facility: HOSPITAL | Age: 84
End: 2019-09-27

## 2019-09-27 NOTE — OUTREACH NOTE
Sepsis Week 4 Survey      Responses   Facility patient discharged from?  [place]   Does the patient have one of the following disease processes/diagnoses(primary or secondary)?  Sepsis   Week 4 attempt successful?  Yes   Call start time  1221   Call end time  1230   General alerts for this patient  Spouse is an MD.   Discharge diagnosis  sepsis r/t HCAP, RA on daily prednisone   Person spoke with today (if not patient) and relationship   and pt   Meds reviewed with patient/caregiver?  Yes   Is the patient taking all medications as directed (includes completed medication regime)?  Yes   Is the patient still receiving Home Health Services?  N/A   Psychosocial issues?  No   Comments  Pt having severe weakness per her report. She is wanting to add some therapy to begin her strengthening. She is very upset about no PT working with her.    What is the patient's perception of their health status since discharge?  Improving   Nursing interventions  Nurse provided patient education   Is the patient/caregiver able to teach back Sepsis?  S - Shivering,fever or very cold, P - Pale or discolored skin, S - Sleepy, difficult to arouse,confused, I -   I feel like I might die-a feeling of hopelessness   Is patient/caregiver able to teach back steps to recovery at home?  Set small, achievable goals for return to baseline health, Record milestones and struggles in a journal, Talk about feelings with family/friends, Eat a balanced diet   Is the patient/caregiver able to teach back signs and symptoms of worsening condition:  Fever, Shortness of breath/rapid respiratory rate, Rapid heart rate (>90)   Is the patient/caregiver able to teach back the hierarchy of who to call/visit for symptoms/problems? PCP, Specialist, Home health nurse, Urgent Care, ED, 911  Yes   Week 4 call completed?  Yes   Would the patient like one additional call?  No   Graduated  Yes   Did the patient feel the follow up calls were helpful during their  recovery period?  Yes   Was the number of calls appropriate?  Yes          Arianna Resendez RN

## 2019-09-30 ENCOUNTER — HOSPITAL ENCOUNTER (EMERGENCY)
Facility: HOSPITAL | Age: 84
Discharge: LEFT BEFORE DISCHARGE | End: 2019-09-30

## 2019-09-30 VITALS
HEART RATE: 76 BPM | RESPIRATION RATE: 18 BRPM | TEMPERATURE: 98.4 F | OXYGEN SATURATION: 95 % | DIASTOLIC BLOOD PRESSURE: 90 MMHG | SYSTOLIC BLOOD PRESSURE: 182 MMHG

## 2019-10-01 ENCOUNTER — HOSPITAL ENCOUNTER (EMERGENCY)
Facility: HOSPITAL | Age: 84
Discharge: HOME | End: 2019-10-01
Admitting: EMERGENCY MEDICINE

## 2019-10-01 ENCOUNTER — APPOINTMENT (OUTPATIENT)
Dept: GENERAL RADIOLOGY | Facility: HOSPITAL | Age: 84
End: 2019-10-01

## 2019-10-01 VITALS
SYSTOLIC BLOOD PRESSURE: 186 MMHG | BODY MASS INDEX: 19.46 KG/M2 | HEIGHT: 61 IN | TEMPERATURE: 99 F | OXYGEN SATURATION: 97 % | HEART RATE: 82 BPM | RESPIRATION RATE: 18 BRPM | DIASTOLIC BLOOD PRESSURE: 84 MMHG

## 2019-10-01 DIAGNOSIS — M54.50 ACUTE BILATERAL LOW BACK PAIN WITHOUT SCIATICA: Primary | ICD-10-CM

## 2019-10-01 LAB
ALBUMIN SERPL-MCNC: 4.1 G/DL (ref 3.5–5.2)
ALBUMIN/GLOB SERPL: 1.3 G/DL
ALP SERPL-CCNC: 35 U/L (ref 39–117)
ALT SERPL W P-5'-P-CCNC: 13 U/L (ref 1–33)
ANION GAP SERPL CALCULATED.3IONS-SCNC: 15.2 MMOL/L (ref 5–15)
AST SERPL-CCNC: 21 U/L (ref 1–32)
BACTERIA UR QL AUTO: ABNORMAL /HPF
BASOPHILS # BLD AUTO: 0.05 10*3/MM3 (ref 0–0.2)
BASOPHILS NFR BLD AUTO: 0.4 % (ref 0–1.5)
BILIRUB SERPL-MCNC: 0.5 MG/DL (ref 0.2–1.2)
BILIRUB UR QL STRIP: NEGATIVE
BUN BLD-MCNC: 13 MG/DL (ref 8–23)
BUN/CREAT SERPL: 31 (ref 7–25)
CALCIUM SPEC-SCNC: 9.7 MG/DL (ref 8.6–10.5)
CHLORIDE SERPL-SCNC: 95 MMOL/L (ref 98–107)
CLARITY UR: CLEAR
CO2 SERPL-SCNC: 24.8 MMOL/L (ref 22–29)
COLOR UR: YELLOW
CREAT BLD-MCNC: 0.42 MG/DL (ref 0.57–1)
DEPRECATED RDW RBC AUTO: 46.8 FL (ref 37–54)
EOSINOPHIL # BLD AUTO: 0.01 10*3/MM3 (ref 0–0.4)
EOSINOPHIL NFR BLD AUTO: 0.1 % (ref 0.3–6.2)
ERYTHROCYTE [DISTWIDTH] IN BLOOD BY AUTOMATED COUNT: 13.7 % (ref 12.3–15.4)
GFR SERPL CREATININE-BSD FRML MDRD: 143 ML/MIN/1.73
GLOBULIN UR ELPH-MCNC: 3.1 GM/DL
GLUCOSE BLD-MCNC: 125 MG/DL (ref 65–99)
GLUCOSE UR STRIP-MCNC: NEGATIVE MG/DL
HCT VFR BLD AUTO: 46.6 % (ref 34–46.6)
HGB BLD-MCNC: 15.6 G/DL (ref 12–15.9)
HGB UR QL STRIP.AUTO: ABNORMAL
HYALINE CASTS UR QL AUTO: ABNORMAL /LPF
IMM GRANULOCYTES # BLD AUTO: 0.1 10*3/MM3 (ref 0–0.05)
IMM GRANULOCYTES NFR BLD AUTO: 0.9 % (ref 0–0.5)
KETONES UR QL STRIP: ABNORMAL
LEUKOCYTE ESTERASE UR QL STRIP.AUTO: NEGATIVE
LYMPHOCYTES # BLD AUTO: 0.8 10*3/MM3 (ref 0.7–3.1)
LYMPHOCYTES NFR BLD AUTO: 7 % (ref 19.6–45.3)
MCH RBC QN AUTO: 31 PG (ref 26.6–33)
MCHC RBC AUTO-ENTMCNC: 33.5 G/DL (ref 31.5–35.7)
MCV RBC AUTO: 92.6 FL (ref 79–97)
MONOCYTES # BLD AUTO: 0.79 10*3/MM3 (ref 0.1–0.9)
MONOCYTES NFR BLD AUTO: 6.9 % (ref 5–12)
NEUTROPHILS # BLD AUTO: 9.76 10*3/MM3 (ref 1.7–7)
NEUTROPHILS NFR BLD AUTO: 84.7 % (ref 42.7–76)
NITRITE UR QL STRIP: NEGATIVE
NRBC BLD AUTO-RTO: 0 /100 WBC (ref 0–0.2)
PH UR STRIP.AUTO: 7 [PH] (ref 5–8)
PLATELET # BLD AUTO: 242 10*3/MM3 (ref 140–450)
PMV BLD AUTO: 9.6 FL (ref 6–12)
POTASSIUM BLD-SCNC: 3.3 MMOL/L (ref 3.5–5.2)
PROT SERPL-MCNC: 7.2 G/DL (ref 6–8.5)
PROT UR QL STRIP: ABNORMAL
RBC # BLD AUTO: 5.03 10*6/MM3 (ref 3.77–5.28)
RBC # UR: ABNORMAL /HPF
REF LAB TEST METHOD: ABNORMAL
SODIUM BLD-SCNC: 135 MMOL/L (ref 136–145)
SP GR UR STRIP: 1.02 (ref 1–1.03)
SQUAMOUS #/AREA URNS HPF: ABNORMAL /HPF
UROBILINOGEN UR QL STRIP: ABNORMAL
WBC NRBC COR # BLD: 11.51 10*3/MM3 (ref 3.4–10.8)
WBC UR QL AUTO: ABNORMAL /HPF

## 2019-10-01 PROCEDURE — 85025 COMPLETE CBC W/AUTO DIFF WBC: CPT | Performed by: NURSE PRACTITIONER

## 2019-10-01 PROCEDURE — 99284 EMERGENCY DEPT VISIT MOD MDM: CPT

## 2019-10-01 PROCEDURE — 72110 X-RAY EXAM L-2 SPINE 4/>VWS: CPT

## 2019-10-01 PROCEDURE — 81001 URINALYSIS AUTO W/SCOPE: CPT | Performed by: NURSE PRACTITIONER

## 2019-10-01 PROCEDURE — 96374 THER/PROPH/DIAG INJ IV PUSH: CPT

## 2019-10-01 PROCEDURE — 96375 TX/PRO/DX INJ NEW DRUG ADDON: CPT

## 2019-10-01 PROCEDURE — 25010000002 MORPHINE PER 10 MG: Performed by: NURSE PRACTITIONER

## 2019-10-01 PROCEDURE — 25010000002 ONDANSETRON PER 1 MG: Performed by: NURSE PRACTITIONER

## 2019-10-01 PROCEDURE — 80053 COMPREHEN METABOLIC PANEL: CPT | Performed by: NURSE PRACTITIONER

## 2019-10-01 PROCEDURE — 96376 TX/PRO/DX INJ SAME DRUG ADON: CPT

## 2019-10-01 PROCEDURE — P9612 CATHETERIZE FOR URINE SPEC: HCPCS

## 2019-10-01 RX ORDER — MORPHINE SULFATE 2 MG/ML
4 INJECTION, SOLUTION INTRAMUSCULAR; INTRAVENOUS ONCE
Status: COMPLETED | OUTPATIENT
Start: 2019-10-01 | End: 2019-10-01

## 2019-10-01 RX ORDER — OXYCODONE HYDROCHLORIDE AND ACETAMINOPHEN 5; 325 MG/1; MG/1
1 TABLET ORAL EVERY 6 HOURS PRN
Qty: 12 TABLET | Refills: 0 | Status: SHIPPED | OUTPATIENT
Start: 2019-10-01 | End: 2019-10-15

## 2019-10-01 RX ORDER — MORPHINE SULFATE 2 MG/ML
2 INJECTION, SOLUTION INTRAMUSCULAR; INTRAVENOUS ONCE
Status: COMPLETED | OUTPATIENT
Start: 2019-10-01 | End: 2019-10-01

## 2019-10-01 RX ORDER — ONDANSETRON 2 MG/ML
4 INJECTION INTRAMUSCULAR; INTRAVENOUS ONCE
Status: COMPLETED | OUTPATIENT
Start: 2019-10-01 | End: 2019-10-01

## 2019-10-01 RX ORDER — METHYLPREDNISOLONE 4 MG/1
TABLET ORAL
Qty: 21 TABLET | Refills: 0 | Status: SHIPPED | OUTPATIENT
Start: 2019-10-01 | End: 2019-10-15

## 2019-10-01 RX ADMIN — ONDANSETRON 4 MG: 2 INJECTION INTRAMUSCULAR; INTRAVENOUS at 16:44

## 2019-10-01 RX ADMIN — MORPHINE SULFATE 2 MG: 2 INJECTION, SOLUTION INTRAMUSCULAR; INTRAVENOUS at 18:56

## 2019-10-01 RX ADMIN — MORPHINE SULFATE 4 MG: 2 INJECTION, SOLUTION INTRAMUSCULAR; INTRAVENOUS at 16:44

## 2019-10-01 NOTE — ED NOTES
"Pt reports stabbing back pain x 4 days. Pt reports back pain \"moves around\", but is worse on left side. Pain is worse with positioning especially standing. Also reports pain shoots down both legs, but history of gout, arthritis, and torn meniscus of RLE.     Pt reports her urine \"comes and goes as it wants\". Increased incontinence of urine but family at bedside reports this is pts baseline.      Pt is hypertensive,  at bedside reports hypertension is usually controlled with meds. Pt rates pain 10/10.    Recently admitted for pneumonia. 96% on RA. Denies cough at this time.     Sona Olson, RN  10/01/19 1612    "

## 2019-10-01 NOTE — ED PROVIDER NOTES
MD ATTESTATION NOTE    The HANK and I have discussed this patient's history, physical exam, and treatment plan.  I have reviewed the documentation and personally had a face to face interaction with the patient. I affirm the documentation and agree with the treatment and plan.  The attached note describes my personal findings.      History  86-year-old female who presents with left-sided lower back pain.  Denies any recent trauma.  Patient states she has been on prednisone and her rheumatologist, Dr. Romano is worried about compression fracture.    Physical Exam  Vital Signs reviewed  Alert, Well Appearing in NAD  Strength and sensation is intact in bilateral lower extremities    Disposition  I reviewed patient's labs and urinalysis.  Awaiting at this point plain films of the lumbar spine.  Patient reports good relief of pain after IV morphine.     Gilberto Rueda MD  10/01/19 5433

## 2019-10-01 NOTE — ED PROVIDER NOTES
EMERGENCY DEPARTMENT ENCOUNTER    CHIEF COMPLAINT  Chief Complaint: back pain  History given by: patient  History limited by: nothing  Time Seen: 1607  Room Number: 38/38  PMD: Arian Moyer MD      HPI:  Pt is a 86 y.o. female who presents with left-sided lumbar back pain that began four days ago. The patient denies known injury or recent trauma. The patient describes the back pain as a stabbing sensation. The patient denies radiation of the back pain to her bilateral lower extremities. The patient reports the back pain is exacerbated with movement. The patient also complains of chronic and unchanged urinary incontinence. The patient denies fever, chills, weakness, or numbness/tingling. The patient reports she has taken her prescribed Codeine and Tylenol PTA without relief of her pain. There are no other complaints at this time. The patient's spouse and caregiver are bedside.    Duration: four days  Timing: constant  Location: left side of lumbar back  Radiation: pt denies radiation of pain.  Quality: stabbing pain  Intensity/Severity: moderate  Progression: not specified  Associated Symptoms: urinary incontinence  Aggravating Factors: The patient reports the back pain is exacerbated with movement.  Alleviating Factors: none specified  Previous Episodes: none specified   Treatment before arrival: The patient reports she has taken her prescribed Codeine and Tylenol PTA without relief of her pain.    PAST MEDICAL HISTORY  Active Ambulatory Problems     Diagnosis Date Noted   • Hypokalemia 08/19/2019   • Rheumatoid arthritis (CMS/HCC) 08/19/2019   • Hypertension 08/19/2019   • Hypocalcemia 08/19/2019   • Generalized weakness 08/19/2019   • Hypomagnesemia 08/19/2019   • HCAP (healthcare-associated pneumonia) 08/24/2019   • Hypophosphatemia 08/24/2019   • Sepsis due to pneumonia (CMS/[facility], located within [facility]) 08/25/2019   • Pneumonia due to gram-negative bacteria (CMS/[facility], located within [facility]) 08/26/2019     Resolved Ambulatory Problems     Diagnosis  Date Noted   • No Resolved Ambulatory Problems     Past Medical History:   Diagnosis Date   • Anxiety    • Arthritis    • Chronic back pain    • Disease of thyroid gland    • Hypertension    • Hypothyroidism    • Left bundle branch block    • Osteoarthritis    • Osteoporosis    • Palpitation    • Rheumatoid arthritis (CMS/HCC)    • 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HCC)        PAST SURGICAL HISTORY  Past Surgical History:   Procedure Laterality Date   • COLON RESECTION WITH COLOSTOMY     • COLONOSCOPY     • COLOSTOMY CLOSURE     • SIGMOIDOSCOPY     • TONSILLECTOMY         FAMILY HISTORY  Family History   Problem Relation Age of Onset   • Stomach cancer Paternal Grandmother    • Cancer Mother    • Stroke Father    • Aneurysm Father    • Diabetes Sister        SOCIAL HISTORY  Social History     Socioeconomic History   • Marital status:      Spouse name: Not on file   • Number of children: Not on file   • Years of education: Not on file   • Highest education level: Not on file   Tobacco Use   • Smoking status: Never Smoker   • Smokeless tobacco: Never Used   • Tobacco comment: caff use   Substance and Sexual Activity   • Alcohol use: No   • Drug use: No   • Sexual activity: Defer         ALLERGIES  Marigold  [calendula officinalis (marigold)]; Sulfa antibiotics; and Sulfites    REVIEW OF SYSTEMS  Review of Systems   Constitutional: Negative.  Negative for activity change, appetite change ( decreased), chills, fatigue and fever.   HENT: Negative.  Negative for congestion, ear pain, rhinorrhea and sore throat.    Eyes: Negative.    Respiratory: Negative.  Negative for cough and shortness of breath.    Cardiovascular: Negative.  Negative for chest pain, palpitations and leg swelling ( pedal).   Gastrointestinal: Negative.  Negative for abdominal pain, constipation, diarrhea, nausea and vomiting.   Endocrine: Negative.    Genitourinary: Negative.  Negative for decreased urine volume, difficulty urinating, dysuria, menstrual  problem, pelvic pain, urgency and vaginal discharge.        Positive for chronic and unchanged urinary incontinence   Musculoskeletal: Positive for back pain (left-sided lumbar back pain).   Skin: Negative.  Negative for rash.   Allergic/Immunologic: Negative.    Neurological: Negative.  Negative for dizziness, weakness, light-headedness, numbness (or tingling) and headaches.   Hematological: Negative.    Psychiatric/Behavioral: Negative.  The patient is not nervous/anxious.    All other systems reviewed and are negative.      PHYSICAL EXAM  ED Triage Vitals [10/01/19 1457]   Temp Heart Rate Resp BP SpO2   99 °F (37.2 °C) 80 16 160/100 98 %      Temp src Heart Rate Source Patient Position BP Location FiO2 (%)   Tympanic Monitor -- -- --       Physical Exam   Constitutional: She is well-developed, well-nourished, and in no distress. No distress.   HENT:   Head: Normocephalic and atraumatic.   Mouth/Throat: Oropharynx is clear and moist and mucous membranes are normal.   Eyes: Pupils are equal, round, and reactive to light.   Neck: Normal range of motion.   Cardiovascular: Normal rate, regular rhythm and normal heart sounds.   Pulmonary/Chest: Effort normal and breath sounds normal. She has no wheezes.   Abdominal: Soft. Bowel sounds are normal. There is no tenderness.   Musculoskeletal: She exhibits no edema.        Lumbar back: She exhibits decreased range of motion and pain. She exhibits no tenderness, no bony tenderness, no swelling and no spasm.   Neurological: She is alert. She has normal sensation and normal strength.   Skin: Skin is warm and dry. No rash noted.   Psychiatric: Mood, memory, affect and judgment normal.   Nursing note and vitals reviewed.      LAB RESULTS  Recent Results (from the past 24 hour(s))   Comprehensive Metabolic Panel    Collection Time: 10/01/19  4:34 PM   Result Value Ref Range    Glucose 125 (H) 65 - 99 mg/dL    BUN 13 8 - 23 mg/dL    Creatinine 0.42 (L) 0.57 - 1.00 mg/dL    Sodium  135 (L) 136 - 145 mmol/L    Potassium 3.3 (L) 3.5 - 5.2 mmol/L    Chloride 95 (L) 98 - 107 mmol/L    CO2 24.8 22.0 - 29.0 mmol/L    Calcium 9.7 8.6 - 10.5 mg/dL    Total Protein 7.2 6.0 - 8.5 g/dL    Albumin 4.10 3.50 - 5.20 g/dL    ALT (SGPT) 13 1 - 33 U/L    AST (SGOT) 21 1 - 32 U/L    Alkaline Phosphatase 35 (L) 39 - 117 U/L    Total Bilirubin 0.5 0.2 - 1.2 mg/dL    eGFR Non African Amer 143 >60 mL/min/1.73    Globulin 3.1 gm/dL    A/G Ratio 1.3 g/dL    BUN/Creatinine Ratio 31.0 (H) 7.0 - 25.0    Anion Gap 15.2 (H) 5.0 - 15.0 mmol/L   CBC Auto Differential    Collection Time: 10/01/19  4:34 PM   Result Value Ref Range    WBC 11.51 (H) 3.40 - 10.80 10*3/mm3    RBC 5.03 3.77 - 5.28 10*6/mm3    Hemoglobin 15.6 12.0 - 15.9 g/dL    Hematocrit 46.6 34.0 - 46.6 %    MCV 92.6 79.0 - 97.0 fL    MCH 31.0 26.6 - 33.0 pg    MCHC 33.5 31.5 - 35.7 g/dL    RDW 13.7 12.3 - 15.4 %    RDW-SD 46.8 37.0 - 54.0 fl    MPV 9.6 6.0 - 12.0 fL    Platelets 242 140 - 450 10*3/mm3    Neutrophil % 84.7 (H) 42.7 - 76.0 %    Lymphocyte % 7.0 (L) 19.6 - 45.3 %    Monocyte % 6.9 5.0 - 12.0 %    Eosinophil % 0.1 (L) 0.3 - 6.2 %    Basophil % 0.4 0.0 - 1.5 %    Immature Grans % 0.9 (H) 0.0 - 0.5 %    Neutrophils, Absolute 9.76 (H) 1.70 - 7.00 10*3/mm3    Lymphocytes, Absolute 0.80 0.70 - 3.10 10*3/mm3    Monocytes, Absolute 0.79 0.10 - 0.90 10*3/mm3    Eosinophils, Absolute 0.01 0.00 - 0.40 10*3/mm3    Basophils, Absolute 0.05 0.00 - 0.20 10*3/mm3    Immature Grans, Absolute 0.10 (H) 0.00 - 0.05 10*3/mm3    nRBC 0.0 0.0 - 0.2 /100 WBC   Urinalysis With Microscopic If Indicated (No Culture) - Urine, Catheter    Collection Time: 10/01/19  4:56 PM   Result Value Ref Range    Color, UA Yellow Yellow, Straw    Appearance, UA Clear Clear    pH, UA 7.0 5.0 - 8.0    Specific Gravity, UA 1.017 1.005 - 1.030    Glucose, UA Negative Negative    Ketones, UA Trace (A) Negative    Bilirubin, UA Negative Negative    Blood, UA Trace (A) Negative    Protein, UA  100 mg/dL (2+) (A) Negative    Leuk Esterase, UA Negative Negative    Nitrite, UA Negative Negative    Urobilinogen, UA 0.2 E.U./dL 0.2 - 1.0 E.U./dL   Urinalysis, Microscopic Only - Urine, Catheter    Collection Time: 10/01/19  4:56 PM   Result Value Ref Range    RBC, UA 0-2 None Seen, 0-2 /HPF    WBC, UA 0-2 None Seen, 0-2 /HPF    Bacteria, UA None Seen None Seen /HPF    Squamous Epithelial Cells, UA 3-6 (A) None Seen, 0-2 /HPF    Hyaline Casts, UA 3-6 None Seen /LPF    Methodology Manual Light Microscopy        I ordered the above labs and reviewed the results    RADIOLOGY  XR Spine Lumbar 4+ View   Final Result   1. No convincing acute fracture is seen in the lumbar spine.   2. 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  There is advanced degenerative disc and endplate changes   throughout the lumbar spine as described.        This report was finalized on 10/1/2019 7:14 PM by Dr. Garett Good M.D.              I ordered the above noted radiological studies and reviewed the images on the PACS system.    MEDICAL RECORD REVIEW  The patient was last hospitalized at [facility] in 08/2019 for HCAP and Sepsis.    PROGRESS AND CONSULTS  1715 Reviewed pt's history and workup with Dr. Rueda.   After a bedside evaluation; Dr. Rueda agrees with the plan of care.    1842 discussed work-up and plan of care with patient and .  Patient states her pain has much improved and she has been resting comfortably.  Will change oral symptomatic medication and discharge home to follow-up with primary care doctor this week.  Patient and  agree with the plan and all questions were answered.     COURSE & MEDICAL DECISION MAKING  Pertinent Labs and Imaging studies that were ordered and reviewed are noted above.  Results were reviewed/discussed with the patient and they were also made aware of online assess.   Pt also made aware that some labs, such as cultures, will not be resulted during ER  "visit and follow up with PMD is necessary.     MEDICATIONS GIVEN IN ER  Medications   morphine injection 4 mg (4 mg Intravenous Given 10/1/19 1644)   ondansetron (ZOFRAN) injection 4 mg (4 mg Intravenous Given 10/1/19 1644)   morphine injection 2 mg (2 mg Intravenous Given 10/1/19 1856)       BP (!) 186/84 (BP Location: Right arm, Patient Position: Lying)   Pulse 82   Temp 99 °F (37.2 °C) (Tympanic)   Resp 18   Ht 154.9 cm (61\")   SpO2 97%   BMI 19.46 kg/m²     Discussed all results and noted any abnormalities with patient.  Discussed absoute need to recheck abnormalities with her PMD.    Reviewed implications of results, diagnosis, meds, responsibility to follow up, warning signs and symptoms of possible worsening, potential complications and reasons to return to ER with patient    Discussed plan for discharge, as there is no emergent indication for admission.  Pt is agreeable and understands need for follow up and repeat testing.  Pt is aware that discharge does not mean that nothing is wrong but it indicates no emergency is present and they must continue care with her PMD.  Pt is discharged with instructions to follow up with primary care doctor to have their blood pressure rechecked.       DIAGNOSIS  Final diagnoses:   Acute bilateral low back pain without sciatica       FOLLOW UP   Arian Moyer MD  [facility] [address] [place] [address]  [phone]    Schedule an appointment as soon as possible for a visit         RX     Medication List      New Prescriptions    methylPREDNISolone 4 MG tablet  Commonly known as:  MEDROL (FELIBERTO)  Take as directed on package instructions.     oxyCODONE-acetaminophen 5-325 MG per tablet  Commonly known as:  PERCOCET  Take 1 tablet by mouth Every 6 (Six) Hours As Needed for Severe Pain .        Changed    polyethylene glycol packet  Commonly known as:  MIRALAX  Take 17 g by mouth Daily.  What changed:  when to take this        Stop    codeine 30 MG tablet   "   predniSONE 5 MG tablet  Commonly known as:  DELTASONE            I personally reviewed the past medical history, past surgical history, social history, family history, current medications and allergies as they appear in this chart.  The scribe's note accurately reflects the work and decisions made by me.           Documentation assistance provided by maia Marie for FABIANO Wilkerson.  Information recorded by the scribe was done at my direction and has been verified and validated by me.       Malu Marie  10/01/19 1733       Olga Rice APRN  10/01/19 1936

## 2019-10-01 NOTE — ED TRIAGE NOTES
Back pain.  Pt was here yesterday with same.  LWBS.  Today ems reports pt was sitting up in bed upon their arrival.  Pt again reports she is unable to sit up in a wheelchair.  Pt put in bay bed until low acuity bed is available

## 2019-10-15 ENCOUNTER — OFFICE VISIT (OUTPATIENT)
Dept: CARDIOLOGY | Facility: CLINIC | Age: 84
End: 2019-10-15

## 2019-10-15 VITALS
HEART RATE: 61 BPM | WEIGHT: 125 LBS | HEIGHT: 60 IN | SYSTOLIC BLOOD PRESSURE: 140 MMHG | DIASTOLIC BLOOD PRESSURE: 82 MMHG | BODY MASS INDEX: 24.54 KG/M2

## 2019-10-15 DIAGNOSIS — I10 ESSENTIAL HYPERTENSION: Primary | ICD-10-CM

## 2019-10-15 PROCEDURE — 99214 OFFICE O/P EST MOD 30 MIN: CPT | Performed by: INTERNAL MEDICINE

## 2019-10-15 NOTE — PROGRESS NOTES
Subjective:     Encounter Date: 10/15/19        Patient ID: Yeni Jimenez is a 86 y.o. female.    Chief Complaint: palpitations  History of Present Illness  Dear Dr. Moyer,    I had the pleasure of seeing this patient in the office today for f/u on her HTN.    Was just seen in the emergency room for low back pain.    Generally her blood pressures been doing well on the current medical regimen.  She denies any chest pain or chest discomfort.    She has previously followed with Dr. Christianson.  She saw him in April of this year.  Evaluation at that time was largely unremarkable; she did complain of some increasing fatigue at that time.  She had an echocardiogram performed in December 2013 and this demonstrated normal biventricular size and function.  She had an injection fraction of 60-65 percent.  She also wore an event monitor March 2017 that showed sinus rhythm with a interventricular conduction delay.  No significant arrhythmia was seen. She has followed with a cardiologist in [place] due to a heart murmur.    She then had another event monitor in 8/2017:  Interpretation Summary        · An abnormal monitor study.     Sinus rhythm with frequent (16%) PACs.             The following portions of the patient's history were reviewed and updated as appropriate: allergies, current medications, past family history, past medical history, past social history, past surgical history and problem list.    Past Medical History:   Diagnosis Date   • Anxiety    • Arthritis    • Chronic back pain    • Disease of thyroid gland    • Hypertension    • Hypothyroidism    • Left bundle branch block    • Osteoarthritis    • Osteoporosis    • Palpitation    • Rheumatoid arthritis (CMS/HCC)    • 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HCC)        Past Surgical History:   Procedure Laterality Date   • COLON RESECTION WITH COLOSTOMY     • COLONOSCOPY     • COLOSTOMY CLOSURE     • SIGMOIDOSCOPY     • TONSILLECTOMY         Social History  "    Socioeconomic History   • Marital status:      Spouse name: Not on file   • Number of children: Not on file   • Years of education: Not on file   • Highest education level: Not on file   Tobacco Use   • Smoking status: Never Smoker   • Smokeless tobacco: Never Used   • Tobacco comment: caff use   Substance and Sexual Activity   • Alcohol use: No   • Drug use: No   • Sexual activity: Defer       Review of Systems   Constitution: Positive for weakness and malaise/fatigue. Negative for chills, decreased appetite, fever and night sweats.   HENT: Negative for ear discharge, ear pain, hearing loss, nosebleeds and sore throat.    Eyes: Positive for blurred vision. Negative for double vision and pain.   Cardiovascular: Negative for cyanosis.   Respiratory: Negative for hemoptysis and sputum production.    Endocrine: Negative for cold intolerance and heat intolerance.   Hematologic/Lymphatic: Negative for adenopathy.   Skin: Negative for dry skin, itching, nail changes, rash and suspicious lesions.   Musculoskeletal: Positive for back pain, joint pain and joint swelling. Negative for arthritis, gout, muscle cramps, muscle weakness, myalgias and neck pain.   Gastrointestinal: Negative for anorexia, bowel incontinence, constipation, diarrhea, dysphagia, hematemesis and jaundice.   Genitourinary: Negative for bladder incontinence, dysuria, flank pain, frequency, hematuria and nocturia.   Neurological: Negative for focal weakness, numbness, paresthesias and seizures.   Psychiatric/Behavioral: Negative for altered mental status, hallucinations, hypervigilance, suicidal ideas and thoughts of violence.   Allergic/Immunologic: Negative for persistent infections.       Procedures       Objective:     Vitals:    10/15/19 1535   BP: 140/82   Pulse: 61   Weight: 56.7 kg (125 lb)   Height: 152.4 cm (60\")         Physical Exam   Constitutional: She is oriented to person, place, and time. She appears well-developed and " well-nourished. No distress.   HENT:   Head: Normocephalic and atraumatic.   Nose: Nose normal.   Mouth/Throat: Oropharynx is clear and moist.   Eyes: Conjunctivae and EOM are normal. Pupils are equal, round, and reactive to light. Right eye exhibits no discharge. Left eye exhibits no discharge.   Neck: Normal range of motion. Neck supple. No tracheal deviation present. No thyromegaly present.   Cardiovascular: Normal rate, regular rhythm, S1 normal, S2 normal, normal heart sounds and normal pulses. Exam reveals no S3.   Pulmonary/Chest: Effort normal and breath sounds normal. No stridor. No respiratory distress. She exhibits no tenderness.   Abdominal: Soft. Bowel sounds are normal. She exhibits no distension and no mass. There is no tenderness. There is no rebound and no guarding.   Musculoskeletal: Normal range of motion. She exhibits no tenderness or deformity.   Lymphadenopathy:     She has no cervical adenopathy.   Neurological: She is alert and oriented to person, place, and time. She has normal reflexes.   Skin: Skin is warm and dry. No rash noted. She is not diaphoretic. No erythema.   Psychiatric: She has a normal mood and affect. Thought content normal.       Lab Review:             Performed        Assessment:          Diagnosis Plan   1. Essential hypertension            Plan:       1.  Hypertension- good control currently, she was hypertensive when she had severe back pain in the emergency room.  Will follow now on her current medical regimen.  2.  Interventricular conduction delay, old, follow    Thank you very much for allowing us to participate in the care of this pleasant patient.  Please don't hesitate to call if I can be of assistance in any way.      Current Outpatient Medications:   •  acetaminophen (TYLENOL) 160 MG/5ML solution, Take 640 mg by mouth Every 4 (Four) Hours As Needed for Mild Pain  (Takes with Codeine). PATIENT TOLERATES ROSSI FLAVOR ONLY, Disp: , Rfl:   •  ALPRAZolam (XANAX) 1  MG tablet, Take 1 mg by mouth 5 (Five) Times a Day As Needed for Anxiety., Disp: , Rfl:   •  amLODIPine (NORVASC) 5 MG tablet, Take 5 mg by mouth 2 (Two) Times a Day., Disp: , Rfl:   •  ascorbic acid (VITAMIN C) 1000 MG tablet, Take 1,000 mg by mouth Daily., Disp: , Rfl:   •  Cholecalciferol (VITAMIN D-3) 1000 UNITS capsule, Take 1,000 Units by mouth Daily., Disp: , Rfl:   •  diphenhydrAMINE (BENADRYL) 25 mg capsule, Take 25 mg by mouth Every 6 (Six) Hours As Needed (Difficulty swallowing)., Disp: , Rfl:   •  ipratropium-albuterol (DUO-NEB) 0.5-2.5 mg/3 ml nebulizer, Inhale 3 mL 3 (Three) Times a Day As Needed., Disp: , Rfl:   •  lansoprazole (PREVACID) 30 MG capsule, Take 30 mg by mouth 2 (Two) Times a Day., Disp: , Rfl:   •  leflunomide (ARAVA) 20 MG tablet, Take 20 mg by mouth Daily., Disp: , Rfl:   •  levothyroxine (SYNTHROID, LEVOTHROID) 112 MCG tablet, Take 112 mcg by mouth Daily., Disp: , Rfl:   •  lidocaine (LIDODERM) 5 %, Place 1 patch on the skin as directed by provider Daily As Needed for Mild Pain . Remove & Discard patch within 12 hours or as directed by MD, Disp: , Rfl:   •  losartan (COZAAR) 50 MG tablet, Take 50 mg by mouth 2 (Two) Times a Day., Disp: , Rfl:   •  metoprolol succinate XL (TOPROL-XL) 25 MG 24 hr tablet, Take 25 mg by mouth Every Evening., Disp: , Rfl:   •  Multiple Vitamins-Minerals (MULTIVITAMIN WITH MINERALS) tablet tablet, Take 1 tablet by mouth Daily., Disp: , Rfl:   •  Multiple Vitamins-Minerals (PRESERVISION/LUTEIN) capsule, Take 1 capsule by mouth Daily., Disp: , Rfl:   •  Naloxegol Oxalate (MOVANTIK) 25 MG tablet, Take 25 mg by mouth Daily., Disp: , Rfl:   •  polyethylene glycol (MIRALAX) packet, Take 17 g by mouth Daily. (Patient taking differently: Take 17 g by mouth Every Other Day.), Disp: 100 each, Rfl: 0  •  salsalate (DISALCID) 500 MG tablet, Take 1,000 mg by mouth 3 (Three) Times a Day As Needed., Disp: , Rfl:   •  senna (SENOKOT) 8.6 MG tablet tablet, Take 2 tablets by  mouth 2 (Two) Times a Day., Disp: , Rfl:   •  traZODone (DESYREL) 50 MG tablet, Take 50 mg by mouth Every Night., Disp: , Rfl:

## 2020-01-01 ENCOUNTER — APPOINTMENT (OUTPATIENT)
Dept: GENERAL RADIOLOGY | Facility: HOSPITAL | Age: 85
End: 2020-01-01

## 2020-01-01 ENCOUNTER — READMISSION MANAGEMENT (OUTPATIENT)
Dept: CALL CENTER | Facility: HOSPITAL | Age: 85
End: 2020-01-01

## 2020-01-01 ENCOUNTER — TELEPHONE (OUTPATIENT)
Dept: CARDIOLOGY | Facility: CLINIC | Age: 85
End: 2020-01-01

## 2020-01-01 ENCOUNTER — APPOINTMENT (OUTPATIENT)
Dept: PAIN MEDICINE | Facility: HOSPITAL | Age: 85
End: 2020-01-01

## 2020-01-01 ENCOUNTER — APPOINTMENT (OUTPATIENT)
Dept: CARDIOLOGY | Facility: HOSPITAL | Age: 85
End: 2020-01-01

## 2020-01-01 ENCOUNTER — APPOINTMENT (OUTPATIENT)
Dept: MRI IMAGING | Facility: HOSPITAL | Age: 85
End: 2020-01-01

## 2020-01-01 ENCOUNTER — HOSPITAL ENCOUNTER (EMERGENCY)
Facility: HOSPITAL | Age: 85
Discharge: HOME | End: 2020-10-09
Attending: EMERGENCY MEDICINE | Admitting: EMERGENCY MEDICINE

## 2020-01-01 ENCOUNTER — LAB REQUISITION (OUTPATIENT)
Dept: LAB | Facility: HOSPITAL | Age: 85
End: 2020-01-01

## 2020-01-01 ENCOUNTER — APPOINTMENT (OUTPATIENT)
Dept: CT IMAGING | Facility: HOSPITAL | Age: 85
End: 2020-01-01

## 2020-01-01 ENCOUNTER — OFFICE VISIT (OUTPATIENT)
Dept: GASTROENTEROLOGY | Facility: CLINIC | Age: 85
End: 2020-01-01

## 2020-01-01 ENCOUNTER — HOSPITAL ENCOUNTER (INPATIENT)
Facility: HOSPITAL | Age: 85
LOS: 1 days | End: 2020-10-22
Attending: EMERGENCY MEDICINE | Admitting: INTERNAL MEDICINE

## 2020-01-01 ENCOUNTER — HOSPITAL ENCOUNTER (INPATIENT)
Facility: HOSPITAL | Age: 85
LOS: 17 days | Discharge: HOME HEALTH | End: 2020-08-11
Attending: EMERGENCY MEDICINE | Admitting: HOSPITALIST

## 2020-01-01 ENCOUNTER — HOSPITAL ENCOUNTER (INPATIENT)
Facility: HOSPITAL | Age: 85
LOS: 8 days | Discharge: HOME HEALTH | End: 2020-05-27
Attending: EMERGENCY MEDICINE | Admitting: INTERNAL MEDICINE

## 2020-01-01 ENCOUNTER — PRIOR AUTHORIZATION (OUTPATIENT)
Dept: GASTROENTEROLOGY | Facility: CLINIC | Age: 85
End: 2020-01-01

## 2020-01-01 ENCOUNTER — ANESTHESIA EVENT (OUTPATIENT)
Dept: PAIN MEDICINE | Facility: HOSPITAL | Age: 85
End: 2020-01-01

## 2020-01-01 ENCOUNTER — ANESTHESIA (OUTPATIENT)
Dept: PAIN MEDICINE | Facility: HOSPITAL | Age: 85
End: 2020-01-01

## 2020-01-01 ENCOUNTER — TELEPHONE (OUTPATIENT)
Dept: GASTROENTEROLOGY | Facility: CLINIC | Age: 85
End: 2020-01-01

## 2020-01-01 VITALS
DIASTOLIC BLOOD PRESSURE: 83 MMHG | HEART RATE: 92 BPM | TEMPERATURE: 95.7 F | OXYGEN SATURATION: 100 % | RESPIRATION RATE: 17 BRPM | SYSTOLIC BLOOD PRESSURE: 116 MMHG

## 2020-01-01 VITALS
TEMPERATURE: 97 F | WEIGHT: 131 LBS | HEIGHT: 61 IN | DIASTOLIC BLOOD PRESSURE: 70 MMHG | RESPIRATION RATE: 16 BRPM | BODY MASS INDEX: 24.73 KG/M2 | SYSTOLIC BLOOD PRESSURE: 127 MMHG | OXYGEN SATURATION: 95 % | HEART RATE: 80 BPM

## 2020-01-01 VITALS
HEIGHT: 60 IN | SYSTOLIC BLOOD PRESSURE: 164 MMHG | TEMPERATURE: 99.1 F | BODY MASS INDEX: 24.41 KG/M2 | DIASTOLIC BLOOD PRESSURE: 84 MMHG

## 2020-01-01 VITALS
BODY MASS INDEX: 20.27 KG/M2 | TEMPERATURE: 99 F | WEIGHT: 126.1 LBS | SYSTOLIC BLOOD PRESSURE: 114 MMHG | OXYGEN SATURATION: 73 % | HEIGHT: 66 IN | DIASTOLIC BLOOD PRESSURE: 83 MMHG

## 2020-01-01 VITALS
SYSTOLIC BLOOD PRESSURE: 174 MMHG | BODY MASS INDEX: 23.2 KG/M2 | WEIGHT: 118.2 LBS | HEIGHT: 60 IN | DIASTOLIC BLOOD PRESSURE: 80 MMHG | OXYGEN SATURATION: 95 % | TEMPERATURE: 98.4 F | RESPIRATION RATE: 18 BRPM | HEART RATE: 65 BPM

## 2020-01-01 DIAGNOSIS — E27.40 ACUTE ADRENAL INSUFFICIENCY (HCC): ICD-10-CM

## 2020-01-01 DIAGNOSIS — R41.82 ALTERED MENTAL STATUS, UNSPECIFIED ALTERED MENTAL STATUS TYPE: Primary | ICD-10-CM

## 2020-01-01 DIAGNOSIS — E03.9 HYPOTHYROIDISM, UNSPECIFIED TYPE: Primary | ICD-10-CM

## 2020-01-01 DIAGNOSIS — R79.89 ELEVATED LACTIC ACID LEVEL: ICD-10-CM

## 2020-01-01 DIAGNOSIS — A41.9 SEVERE SEPSIS (HCC): Primary | ICD-10-CM

## 2020-01-01 DIAGNOSIS — M25.562 ACUTE PAIN OF LEFT KNEE: ICD-10-CM

## 2020-01-01 DIAGNOSIS — F03.90 DEMENTIA WITHOUT BEHAVIORAL DISTURBANCE, UNSPECIFIED DEMENTIA TYPE: ICD-10-CM

## 2020-01-01 DIAGNOSIS — Z74.09 IMPAIRED MOBILITY AND ACTIVITIES OF DAILY LIVING: ICD-10-CM

## 2020-01-01 DIAGNOSIS — K59.04 CHRONIC IDIOPATHIC CONSTIPATION: Primary | ICD-10-CM

## 2020-01-01 DIAGNOSIS — Z78.9 IMPAIRED MOBILITY AND ACTIVITIES OF DAILY LIVING: ICD-10-CM

## 2020-01-01 DIAGNOSIS — J18.9 PNEUMONIA OF BOTH LUNGS DUE TO INFECTIOUS ORGANISM, UNSPECIFIED PART OF LUNG: ICD-10-CM

## 2020-01-01 DIAGNOSIS — I10 HYPERTENSION, UNSPECIFIED TYPE: ICD-10-CM

## 2020-01-01 DIAGNOSIS — R93.89 ABNORMAL CXR: ICD-10-CM

## 2020-01-01 DIAGNOSIS — R65.20 SEVERE SEPSIS (HCC): Primary | ICD-10-CM

## 2020-01-01 DIAGNOSIS — U07.1 COVID-19: ICD-10-CM

## 2020-01-01 DIAGNOSIS — E63.9 VERY POOR NUTRITION: ICD-10-CM

## 2020-01-01 DIAGNOSIS — Z00.00 ENCOUNTER FOR GENERAL ADULT MEDICAL EXAMINATION WITHOUT ABNORMAL FINDINGS: ICD-10-CM

## 2020-01-01 DIAGNOSIS — D64.9 ANEMIA, UNSPECIFIED TYPE: ICD-10-CM

## 2020-01-01 DIAGNOSIS — E03.9 HYPOTHYROIDISM, UNSPECIFIED TYPE: ICD-10-CM

## 2020-01-01 DIAGNOSIS — J18.9 COMMUNITY ACQUIRED PNEUMONIA OF RIGHT UPPER LOBE OF LUNG: Primary | ICD-10-CM

## 2020-01-01 LAB
ABO GROUP BLD: NORMAL
ABO GROUP BLD: NORMAL
ALBUMIN SERPL-MCNC: 1.7 G/DL (ref 3.5–5.2)
ALBUMIN SERPL-MCNC: 2 G/DL (ref 3.5–5.2)
ALBUMIN SERPL-MCNC: 2.1 G/DL (ref 3.5–5.2)
ALBUMIN SERPL-MCNC: 2.3 G/DL (ref 3.5–5.2)
ALBUMIN SERPL-MCNC: 2.8 G/DL (ref 3.5–5.2)
ALBUMIN SERPL-MCNC: 3.1 G/DL (ref 3.5–5.2)
ALBUMIN SERPL-MCNC: 3.4 G/DL (ref 3.5–5.2)
ALBUMIN SERPL-MCNC: 3.6 G/DL (ref 3.5–5.2)
ALBUMIN/GLOB SERPL: 0.6 G/DL
ALBUMIN/GLOB SERPL: 0.7 G/DL
ALBUMIN/GLOB SERPL: 0.7 G/DL
ALBUMIN/GLOB SERPL: 0.8 G/DL
ALBUMIN/GLOB SERPL: 0.9 G/DL
ALBUMIN/GLOB SERPL: 1.2 G/DL
ALBUMIN/GLOB SERPL: 1.3 G/DL
ALP SERPL-CCNC: 131 U/L (ref 39–117)
ALP SERPL-CCNC: 148 U/L (ref 39–117)
ALP SERPL-CCNC: 149 U/L (ref 39–117)
ALP SERPL-CCNC: 167 U/L (ref 39–117)
ALP SERPL-CCNC: 30 U/L (ref 39–117)
ALP SERPL-CCNC: 32 U/L (ref 39–117)
ALP SERPL-CCNC: 38 U/L (ref 39–117)
ALT SERPL W P-5'-P-CCNC: 11 U/L (ref 1–33)
ALT SERPL W P-5'-P-CCNC: 12 U/L (ref 1–33)
ALT SERPL W P-5'-P-CCNC: 13 U/L (ref 1–33)
ALT SERPL W P-5'-P-CCNC: 16 U/L (ref 1–33)
ALT SERPL W P-5'-P-CCNC: 17 U/L (ref 1–33)
AMPHET+METHAMPHET UR QL: NEGATIVE
ANION GAP SERPL CALCULATED.3IONS-SCNC: 10 MMOL/L (ref 5–15)
ANION GAP SERPL CALCULATED.3IONS-SCNC: 10.6 MMOL/L (ref 5–15)
ANION GAP SERPL CALCULATED.3IONS-SCNC: 10.7 MMOL/L (ref 5–15)
ANION GAP SERPL CALCULATED.3IONS-SCNC: 11.4 MMOL/L (ref 5–15)
ANION GAP SERPL CALCULATED.3IONS-SCNC: 11.6 MMOL/L (ref 5–15)
ANION GAP SERPL CALCULATED.3IONS-SCNC: 11.7 MMOL/L (ref 5–15)
ANION GAP SERPL CALCULATED.3IONS-SCNC: 12.3 MMOL/L (ref 5–15)
ANION GAP SERPL CALCULATED.3IONS-SCNC: 12.6 MMOL/L (ref 5–15)
ANION GAP SERPL CALCULATED.3IONS-SCNC: 12.6 MMOL/L (ref 5–15)
ANION GAP SERPL CALCULATED.3IONS-SCNC: 12.8 MMOL/L (ref 5–15)
ANION GAP SERPL CALCULATED.3IONS-SCNC: 12.9 MMOL/L (ref 5–15)
ANION GAP SERPL CALCULATED.3IONS-SCNC: 13.1 MMOL/L (ref 5–15)
ANION GAP SERPL CALCULATED.3IONS-SCNC: 13.2 MMOL/L (ref 5–15)
ANION GAP SERPL CALCULATED.3IONS-SCNC: 14.8 MMOL/L (ref 5–15)
ANION GAP SERPL CALCULATED.3IONS-SCNC: 15.4 MMOL/L (ref 5–15)
ANION GAP SERPL CALCULATED.3IONS-SCNC: 6.8 MMOL/L (ref 5–15)
ANION GAP SERPL CALCULATED.3IONS-SCNC: 7 MMOL/L (ref 5–15)
ANION GAP SERPL CALCULATED.3IONS-SCNC: 7.7 MMOL/L (ref 5–15)
ANION GAP SERPL CALCULATED.3IONS-SCNC: 8.2 MMOL/L (ref 5–15)
ANION GAP SERPL CALCULATED.3IONS-SCNC: 8.8 MMOL/L (ref 5–15)
ANION GAP SERPL CALCULATED.3IONS-SCNC: 9.1 MMOL/L (ref 5–15)
ANION GAP SERPL CALCULATED.3IONS-SCNC: 9.9 MMOL/L (ref 5–15)
AORTIC DIMENSIONLESS INDEX: 1 (DI)
APTT PPP: 34.4 SECONDS (ref 22.7–35.4)
ARTERIAL PATENCY WRIST A: ABNORMAL
ARTERIAL PATENCY WRIST A: POSITIVE
ARTERIAL PATENCY WRIST A: POSITIVE
AST SERPL-CCNC: 16 U/L (ref 1–32)
AST SERPL-CCNC: 18 U/L (ref 1–32)
AST SERPL-CCNC: 18 U/L (ref 1–32)
AST SERPL-CCNC: 19 U/L (ref 1–32)
AST SERPL-CCNC: 21 U/L (ref 1–32)
AST SERPL-CCNC: 24 U/L (ref 1–32)
AST SERPL-CCNC: 25 U/L (ref 1–32)
ATMOSPHERIC PRESS: 748.8 MMHG
ATMOSPHERIC PRESS: 752 MMHG
ATMOSPHERIC PRESS: 754 MMHG
B PARAPERT DNA SPEC QL NAA+PROBE: NOT DETECTED
B PERT DNA SPEC QL NAA+PROBE: NOT DETECTED
BACTERIA BLD CULT: ABNORMAL
BACTERIA ID TEST ISLT QL CULT: ABNORMAL
BACTERIA SPEC AEROBE CULT: NORMAL
BACTERIA UR QL AUTO: ABNORMAL /HPF
BARBITURATES UR QL SCN: NEGATIVE
BASE EXCESS BLDA CALC-SCNC: 0.1 MMOL/L (ref 0–2)
BASE EXCESS BLDA CALC-SCNC: 1.2 MMOL/L (ref 0–2)
BASE EXCESS BLDA CALC-SCNC: 2.8 MMOL/L (ref 0–2)
BASOPHILS # BLD AUTO: 0.02 10*3/MM3 (ref 0–0.2)
BASOPHILS # BLD AUTO: 0.03 10*3/MM3 (ref 0–0.2)
BASOPHILS # BLD AUTO: 0.04 10*3/MM3 (ref 0–0.2)
BASOPHILS # BLD AUTO: 0.05 10*3/MM3 (ref 0–0.2)
BASOPHILS # BLD AUTO: 0.05 10*3/MM3 (ref 0–0.2)
BASOPHILS # BLD AUTO: 0.07 10*3/MM3 (ref 0–0.2)
BASOPHILS # BLD AUTO: 0.08 10*3/MM3 (ref 0–0.2)
BASOPHILS # BLD AUTO: 0.09 10*3/MM3 (ref 0–0.2)
BASOPHILS # BLD AUTO: 0.1 10*3/MM3 (ref 0–0.2)
BASOPHILS # BLD AUTO: 0.1 10*3/MM3 (ref 0–0.2)
BASOPHILS # BLD AUTO: 0.12 10*3/MM3 (ref 0–0.2)
BASOPHILS NFR BLD AUTO: 0.1 % (ref 0–1.5)
BASOPHILS NFR BLD AUTO: 0.2 % (ref 0–1.5)
BASOPHILS NFR BLD AUTO: 0.3 % (ref 0–1.5)
BASOPHILS NFR BLD AUTO: 0.3 % (ref 0–1.5)
BASOPHILS NFR BLD AUTO: 0.4 % (ref 0–1.5)
BASOPHILS NFR BLD AUTO: 0.4 % (ref 0–1.5)
BASOPHILS NFR BLD AUTO: 0.6 % (ref 0–1.5)
BASOPHILS NFR BLD AUTO: 0.6 % (ref 0–1.5)
BASOPHILS NFR BLD AUTO: 0.7 % (ref 0–1.5)
BASOPHILS NFR BLD AUTO: 0.8 % (ref 0–1.5)
BASOPHILS NFR BLD AUTO: 0.8 % (ref 0–1.5)
BASOPHILS NFR BLD AUTO: 1 % (ref 0–1.5)
BASOPHILS NFR BLD AUTO: 1 % (ref 0–1.5)
BASOPHILS NFR BLD AUTO: 1.3 % (ref 0–1.5)
BDY SITE: ABNORMAL
BENZODIAZ UR QL SCN: POSITIVE
BH CV ECHO MEAS - ACS: 1.1 CM
BH CV ECHO MEAS - AO MAX PG (FULL): -0.12 MMHG
BH CV ECHO MEAS - AO MAX PG: 6 MMHG
BH CV ECHO MEAS - AO MEAN PG (FULL): -1 MMHG
BH CV ECHO MEAS - AO MEAN PG: 2 MMHG
BH CV ECHO MEAS - AO ROOT AREA (BSA CORRECTED): 1.6
BH CV ECHO MEAS - AO ROOT AREA: 4.9 CM^2
BH CV ECHO MEAS - AO ROOT DIAM: 2.5 CM
BH CV ECHO MEAS - AO V2 MAX: 122.2 CM/SEC
BH CV ECHO MEAS - AO V2 MEAN: 70.2 CM/SEC
BH CV ECHO MEAS - AO V2 VTI: 17.7 CM
BH CV ECHO MEAS - AVA(I,A): 2.6 CM^2
BH CV ECHO MEAS - AVA(I,D): 2.6 CM^2
BH CV ECHO MEAS - AVA(V,A): 2.3 CM^2
BH CV ECHO MEAS - AVA(V,D): 2.3 CM^2
BH CV ECHO MEAS - BSA(HAYCOCK): 1.6 M^2
BH CV ECHO MEAS - BSA: 1.6 M^2
BH CV ECHO MEAS - BZI_BMI: 25.1 KILOGRAMS/M^2
BH CV ECHO MEAS - BZI_METRIC_HEIGHT: 154.9 CM
BH CV ECHO MEAS - BZI_METRIC_WEIGHT: 60.3 KG
BH CV ECHO MEAS - DESC AORTA: 2.2 CM
BH CV ECHO MEAS - EDV(CUBED): 103.8 ML
BH CV ECHO MEAS - EDV(MOD-SP2): 79 ML
BH CV ECHO MEAS - EDV(MOD-SP4): 63 ML
BH CV ECHO MEAS - EDV(TEICH): 102.4 ML
BH CV ECHO MEAS - EF(CUBED): 51.2 %
BH CV ECHO MEAS - EF(MOD-BP): 37 %
BH CV ECHO MEAS - EF(MOD-SP2): 36.7 %
BH CV ECHO MEAS - EF(MOD-SP4): 42.9 %
BH CV ECHO MEAS - EF(TEICH): 43.2 %
BH CV ECHO MEAS - ESV(CUBED): 50.7 ML
BH CV ECHO MEAS - ESV(MOD-SP2): 50 ML
BH CV ECHO MEAS - ESV(MOD-SP4): 36 ML
BH CV ECHO MEAS - ESV(TEICH): 58.1 ML
BH CV ECHO MEAS - FS: 21.3 %
BH CV ECHO MEAS - IVS/LVPW: 1
BH CV ECHO MEAS - IVSD: 0.7 CM
BH CV ECHO MEAS - LA DIMENSION: 2.5 CM
BH CV ECHO MEAS - LA/AO: 1
BH CV ECHO MEAS - LAT PEAK E' VEL: 5 CM/SEC
BH CV ECHO MEAS - LV DIASTOLIC VOL/BSA (35-75): 39.7 ML/M^2
BH CV ECHO MEAS - LV MASS(C)D: 103.1 GRAMS
BH CV ECHO MEAS - LV MASS(C)DI: 64.9 GRAMS/M^2
BH CV ECHO MEAS - LV MAX PG: 6.1 MMHG
BH CV ECHO MEAS - LV MEAN PG: 3 MMHG
BH CV ECHO MEAS - LV SYSTOLIC VOL/BSA (12-30): 22.7 ML/M^2
BH CV ECHO MEAS - LV V1 MAX: 123.4 CM/SEC
BH CV ECHO MEAS - LV V1 MEAN: 76.7 CM/SEC
BH CV ECHO MEAS - LV V1 VTI: 20.2 CM
BH CV ECHO MEAS - LVIDD: 4.7 CM
BH CV ECHO MEAS - LVIDS: 3.7 CM
BH CV ECHO MEAS - LVLD AP2: 7.7 CM
BH CV ECHO MEAS - LVLD AP4: 7.5 CM
BH CV ECHO MEAS - LVLS AP2: 7.4 CM
BH CV ECHO MEAS - LVLS AP4: 6.4 CM
BH CV ECHO MEAS - LVOT AREA (M): 2.3 CM^2
BH CV ECHO MEAS - LVOT AREA: 2.3 CM^2
BH CV ECHO MEAS - LVOT DIAM: 1.7 CM
BH CV ECHO MEAS - LVPWD: 0.7 CM
BH CV ECHO MEAS - MED PEAK E' VEL: 4 CM/SEC
BH CV ECHO MEAS - MV A DUR: 0.21 SEC
BH CV ECHO MEAS - MV A MAX VEL: 115 CM/SEC
BH CV ECHO MEAS - MV DEC SLOPE: 402 CM/SEC^2
BH CV ECHO MEAS - MV DEC TIME: 230 SEC
BH CV ECHO MEAS - MV E MAX VEL: 57 CM/SEC
BH CV ECHO MEAS - MV E/A: 0.5
BH CV ECHO MEAS - MV MAX PG: 5.2 MMHG
BH CV ECHO MEAS - MV MEAN PG: 2 MMHG
BH CV ECHO MEAS - MV P1/2T MAX VEL: 65 CM/SEC
BH CV ECHO MEAS - MV P1/2T: 47.4 MSEC
BH CV ECHO MEAS - MV V2 MAX: 114.4 CM/SEC
BH CV ECHO MEAS - MV V2 MEAN: 58.1 CM/SEC
BH CV ECHO MEAS - MV V2 VTI: 20.4 CM
BH CV ECHO MEAS - MVA P1/2T LCG: 3.4 CM^2
BH CV ECHO MEAS - MVA(P1/2T): 4.6 CM^2
BH CV ECHO MEAS - MVA(VTI): 2.2 CM^2
BH CV ECHO MEAS - PA ACC SLOPE: 929 CM/SEC^2
BH CV ECHO MEAS - PA ACC TIME: 0.09 SEC
BH CV ECHO MEAS - PA MAX PG (FULL): 0.04 MMHG
BH CV ECHO MEAS - PA MAX PG: 2.6 MMHG
BH CV ECHO MEAS - PA PR(ACCEL): 40.8 MMHG
BH CV ECHO MEAS - PA V2 MAX: 80.5 CM/SEC
BH CV ECHO MEAS - PULM A REVS DUR: 0.19 SEC
BH CV ECHO MEAS - PULM A REVS VEL: 36 CM/SEC
BH CV ECHO MEAS - PULM DIAS VEL: 28.8 CM/SEC
BH CV ECHO MEAS - PULM S/D: 1.7
BH CV ECHO MEAS - PULM SYS VEL: 48 CM/SEC
BH CV ECHO MEAS - PVA(V,A): 2 CM^2
BH CV ECHO MEAS - PVA(V,D): 2 CM^2
BH CV ECHO MEAS - QP/QS: 0.72
BH CV ECHO MEAS - RAP SYSTOLE: 3 MMHG
BH CV ECHO MEAS - RV MAX PG: 2.6 MMHG
BH CV ECHO MEAS - RV MEAN PG: 1 MMHG
BH CV ECHO MEAS - RV V1 MAX: 79.9 CM/SEC
BH CV ECHO MEAS - RV V1 MEAN: 55.1 CM/SEC
BH CV ECHO MEAS - RV V1 VTI: 16.5 CM
BH CV ECHO MEAS - RVOT AREA: 2 CM^2
BH CV ECHO MEAS - RVOT DIAM: 1.6 CM
BH CV ECHO MEAS - RVSP: 41 MMHG
BH CV ECHO MEAS - SI(AO): 54.7 ML/M^2
BH CV ECHO MEAS - SI(CUBED): 33.5 ML/M^2
BH CV ECHO MEAS - SI(LVOT): 28.9 ML/M^2
BH CV ECHO MEAS - SI(MOD-SP2): 18.3 ML/M^2
BH CV ECHO MEAS - SI(MOD-SP4): 17 ML/M^2
BH CV ECHO MEAS - SI(TEICH): 27.8 ML/M^2
BH CV ECHO MEAS - SV(AO): 86.9 ML
BH CV ECHO MEAS - SV(CUBED): 53.2 ML
BH CV ECHO MEAS - SV(LVOT): 45.9 ML
BH CV ECHO MEAS - SV(MOD-SP2): 29 ML
BH CV ECHO MEAS - SV(MOD-SP4): 27 ML
BH CV ECHO MEAS - SV(RVOT): 33.2 ML
BH CV ECHO MEAS - SV(TEICH): 44.2 ML
BH CV ECHO MEAS - TAPSE (>1.6): 1.6 CM2
BH CV ECHO MEAS - TR MAX VEL: 309 CM/SEC
BH CV ECHO MEASUREMENTS AVERAGE E/E' RATIO: 12.67
BH CV VAS BP LEFT ARM: NORMAL MMHG
BH CV XLRA - RV BASE: 2.9 CM
BH CV XLRA - RV LENGTH: 7 CM
BH CV XLRA - RV MID: 2 CM
BH CV XLRA - TDI S': 13 CM/SEC
BILIRUB SERPL-MCNC: 0.2 MG/DL (ref 0.2–1.2)
BILIRUB SERPL-MCNC: 0.2 MG/DL (ref 0–1.2)
BILIRUB SERPL-MCNC: 0.3 MG/DL (ref 0–1.2)
BILIRUB SERPL-MCNC: 0.5 MG/DL (ref 0–1.2)
BILIRUB UR QL STRIP: ABNORMAL
BILIRUB UR QL STRIP: NEGATIVE
BLD GP AB SCN SERPL QL: NEGATIVE
BLD GP AB SCN SERPL QL: NEGATIVE
BLOOD CULTURE ID, PCR 3: ABNORMAL
BUN BLD-MCNC: 11 MG/DL (ref 8–23)
BUN BLD-MCNC: 11 MG/DL (ref 8–23)
BUN BLD-MCNC: 12 MG/DL (ref 8–23)
BUN BLD-MCNC: 12 MG/DL (ref 8–23)
BUN BLD-MCNC: 13 MG/DL (ref 8–23)
BUN BLD-MCNC: 15 MG/DL (ref 8–23)
BUN BLD-MCNC: 17 MG/DL (ref 8–23)
BUN BLD-MCNC: 18 MG/DL (ref 8–23)
BUN BLD-MCNC: 24 MG/DL (ref 8–23)
BUN BLD-MCNC: 25 MG/DL (ref 8–23)
BUN SERPL-MCNC: 11 MG/DL (ref 8–23)
BUN SERPL-MCNC: 13 MG/DL (ref 8–23)
BUN SERPL-MCNC: 14 MG/DL (ref 8–23)
BUN SERPL-MCNC: 14 MG/DL (ref 8–23)
BUN SERPL-MCNC: 19 MG/DL (ref 8–23)
BUN SERPL-MCNC: 20 MG/DL (ref 8–23)
BUN SERPL-MCNC: 22 MG/DL (ref 8–23)
BUN SERPL-MCNC: 22 MG/DL (ref 8–23)
BUN SERPL-MCNC: 23 MG/DL (ref 8–23)
BUN SERPL-MCNC: 25 MG/DL (ref 8–23)
BUN/CREAT SERPL: 19 (ref 7–25)
BUN/CREAT SERPL: 19 (ref 7–25)
BUN/CREAT SERPL: 19.3 (ref 7–25)
BUN/CREAT SERPL: 19.4 (ref 7–25)
BUN/CREAT SERPL: 19.4 (ref 7–25)
BUN/CREAT SERPL: 20.3 (ref 7–25)
BUN/CREAT SERPL: 20.3 (ref 7–25)
BUN/CREAT SERPL: 21 (ref 7–25)
BUN/CREAT SERPL: 21 (ref 7–25)
BUN/CREAT SERPL: 22.7 (ref 7–25)
BUN/CREAT SERPL: 24.7 (ref 7–25)
BUN/CREAT SERPL: 26 (ref 7–25)
BUN/CREAT SERPL: 27.1 (ref 7–25)
BUN/CREAT SERPL: 29 (ref 7–25)
BUN/CREAT SERPL: 29.6 (ref 7–25)
BUN/CREAT SERPL: 31.3 (ref 7–25)
BUN/CREAT SERPL: 31.7 (ref 7–25)
BUN/CREAT SERPL: 35.7 (ref 7–25)
BUN/CREAT SERPL: 35.9 (ref 7–25)
BUN/CREAT SERPL: 38.6 (ref 7–25)
BUN/CREAT SERPL: 39.7 (ref 7–25)
BUN/CREAT SERPL: 46.8 (ref 7–25)
C PNEUM DNA NPH QL NAA+NON-PROBE: NOT DETECTED
CA-I BLD-MCNC: 5.2 MG/DL (ref 4.6–5.4)
CA-I SERPL ISE-MCNC: 1.3 MMOL/L (ref 1.15–1.35)
CALCIUM SPEC-SCNC: 10.2 MG/DL (ref 8.6–10.5)
CALCIUM SPEC-SCNC: 7.5 MG/DL (ref 8.6–10.5)
CALCIUM SPEC-SCNC: 8.1 MG/DL (ref 8.6–10.5)
CALCIUM SPEC-SCNC: 8.5 MG/DL (ref 8.6–10.5)
CALCIUM SPEC-SCNC: 8.5 MG/DL (ref 8.6–10.5)
CALCIUM SPEC-SCNC: 8.8 MG/DL (ref 8.6–10.5)
CALCIUM SPEC-SCNC: 8.9 MG/DL (ref 8.6–10.5)
CALCIUM SPEC-SCNC: 9 MG/DL (ref 8.6–10.5)
CALCIUM SPEC-SCNC: 9.1 MG/DL (ref 8.6–10.5)
CALCIUM SPEC-SCNC: 9.1 MG/DL (ref 8.6–10.5)
CALCIUM SPEC-SCNC: 9.2 MG/DL (ref 8.6–10.5)
CALCIUM SPEC-SCNC: 9.3 MG/DL (ref 8.6–10.5)
CALCIUM SPEC-SCNC: 9.4 MG/DL (ref 8.6–10.5)
CALCIUM SPEC-SCNC: 9.4 MG/DL (ref 8.6–10.5)
CALCIUM SPEC-SCNC: 9.5 MG/DL (ref 8.6–10.5)
CALCIUM SPEC-SCNC: 9.7 MG/DL (ref 8.6–10.5)
CANNABINOIDS SERPL QL: NEGATIVE
CHLORIDE SERPL-SCNC: 100 MMOL/L (ref 98–107)
CHLORIDE SERPL-SCNC: 101 MMOL/L (ref 98–107)
CHLORIDE SERPL-SCNC: 102 MMOL/L (ref 98–107)
CHLORIDE SERPL-SCNC: 103 MMOL/L (ref 98–107)
CHLORIDE SERPL-SCNC: 104 MMOL/L (ref 98–107)
CHLORIDE SERPL-SCNC: 105 MMOL/L (ref 98–107)
CHLORIDE SERPL-SCNC: 106 MMOL/L (ref 98–107)
CHLORIDE SERPL-SCNC: 107 MMOL/L (ref 98–107)
CHLORIDE SERPL-SCNC: 108 MMOL/L (ref 98–107)
CHLORIDE SERPL-SCNC: 108 MMOL/L (ref 98–107)
CHLORIDE SERPL-SCNC: 109 MMOL/L (ref 98–107)
CHLORIDE SERPL-SCNC: 110 MMOL/L (ref 98–107)
CHLORIDE SERPL-SCNC: 110 MMOL/L (ref 98–107)
CHLORIDE SERPL-SCNC: 115 MMOL/L (ref 98–107)
CK MB SERPL-CCNC: 10.77 NG/ML
CK SERPL-CCNC: 173 U/L (ref 20–180)
CLARITY UR: ABNORMAL
CLARITY UR: CLEAR
CO2 SERPL-SCNC: 16.6 MMOL/L (ref 22–29)
CO2 SERPL-SCNC: 21.4 MMOL/L (ref 22–29)
CO2 SERPL-SCNC: 21.4 MMOL/L (ref 22–29)
CO2 SERPL-SCNC: 22.1 MMOL/L (ref 22–29)
CO2 SERPL-SCNC: 22.2 MMOL/L (ref 22–29)
CO2 SERPL-SCNC: 22.2 MMOL/L (ref 22–29)
CO2 SERPL-SCNC: 22.7 MMOL/L (ref 22–29)
CO2 SERPL-SCNC: 23 MMOL/L (ref 22–29)
CO2 SERPL-SCNC: 23.8 MMOL/L (ref 22–29)
CO2 SERPL-SCNC: 23.9 MMOL/L (ref 22–29)
CO2 SERPL-SCNC: 24.2 MMOL/L (ref 22–29)
CO2 SERPL-SCNC: 24.3 MMOL/L (ref 22–29)
CO2 SERPL-SCNC: 24.6 MMOL/L (ref 22–29)
CO2 SERPL-SCNC: 24.8 MMOL/L (ref 22–29)
CO2 SERPL-SCNC: 25.9 MMOL/L (ref 22–29)
CO2 SERPL-SCNC: 26.3 MMOL/L (ref 22–29)
CO2 SERPL-SCNC: 26.4 MMOL/L (ref 22–29)
CO2 SERPL-SCNC: 27.1 MMOL/L (ref 22–29)
CO2 SERPL-SCNC: 27.2 MMOL/L (ref 22–29)
CO2 SERPL-SCNC: 27.4 MMOL/L (ref 22–29)
CO2 SERPL-SCNC: 30 MMOL/L (ref 22–29)
CO2 SERPL-SCNC: 30.3 MMOL/L (ref 22–29)
COCAINE UR QL: NEGATIVE
COLOR UR: ABNORMAL
COLOR UR: YELLOW
COLOR UR: YELLOW
CREAT BLD-MCNC: 0.57 MG/DL (ref 0.57–1)
CREAT BLD-MCNC: 0.58 MG/DL (ref 0.57–1)
CREAT BLD-MCNC: 0.59 MG/DL (ref 0.57–1)
CREAT BLD-MCNC: 0.62 MG/DL (ref 0.57–1)
CREAT BLD-MCNC: 0.62 MG/DL (ref 0.57–1)
CREAT BLD-MCNC: 0.66 MG/DL (ref 0.57–1)
CREAT BLD-MCNC: 0.8 MG/DL (ref 0.57–1)
CREAT BLD-MCNC: 0.81 MG/DL (ref 0.57–1)
CREAT BLD-MCNC: 0.81 MG/DL (ref 0.57–1)
CREAT BLD-MCNC: 0.93 MG/DL (ref 0.57–1)
CREAT SERPL-MCNC: 0.39 MG/DL (ref 0.57–1)
CREAT SERPL-MCNC: 0.47 MG/DL (ref 0.57–1)
CREAT SERPL-MCNC: 0.49 MG/DL (ref 0.57–1)
CREAT SERPL-MCNC: 0.5 MG/DL (ref 0.57–1)
CREAT SERPL-MCNC: 0.57 MG/DL (ref 0.57–1)
CREAT SERPL-MCNC: 0.58 MG/DL (ref 0.57–1)
CREAT SERPL-MCNC: 0.58 MG/DL (ref 0.57–1)
CREAT SERPL-MCNC: 0.63 MG/DL (ref 0.57–1)
CREAT SERPL-MCNC: 0.66 MG/DL (ref 0.57–1)
CREAT SERPL-MCNC: 0.69 MG/DL (ref 0.57–1)
CREAT SERPL-MCNC: 0.69 MG/DL (ref 0.57–1)
CREAT SERPL-MCNC: 0.7 MG/DL (ref 0.57–1)
CREAT SERPL-MCNC: 0.7 MG/DL (ref 0.57–1)
CREAT SERPL-MCNC: 0.81 MG/DL (ref 0.57–1)
CRP SERPL-MCNC: 2.65 MG/DL (ref 0–0.5)
CRP SERPL-MCNC: 3.26 MG/DL (ref 0–0.5)
D-LACTATE SERPL-SCNC: 0.9 MMOL/L (ref 0.5–2)
D-LACTATE SERPL-SCNC: 1.2 MMOL/L (ref 0.5–2)
D-LACTATE SERPL-SCNC: 1.5 MMOL/L (ref 0.5–2)
D-LACTATE SERPL-SCNC: 1.9 MMOL/L (ref 0.5–2)
D-LACTATE SERPL-SCNC: 2.5 MMOL/L (ref 0.5–2)
D-LACTATE SERPL-SCNC: 2.8 MMOL/L (ref 0.5–2)
D-LACTATE SERPL-SCNC: 5.8 MMOL/L (ref 0.5–2)
DEPRECATED RDW RBC AUTO: 44.7 FL (ref 37–54)
DEPRECATED RDW RBC AUTO: 45.2 FL (ref 37–54)
DEPRECATED RDW RBC AUTO: 45.2 FL (ref 37–54)
DEPRECATED RDW RBC AUTO: 45.3 FL (ref 37–54)
DEPRECATED RDW RBC AUTO: 45.4 FL (ref 37–54)
DEPRECATED RDW RBC AUTO: 45.6 FL (ref 37–54)
DEPRECATED RDW RBC AUTO: 45.7 FL (ref 37–54)
DEPRECATED RDW RBC AUTO: 46.5 FL (ref 37–54)
DEPRECATED RDW RBC AUTO: 46.7 FL (ref 37–54)
DEPRECATED RDW RBC AUTO: 46.7 FL (ref 37–54)
DEPRECATED RDW RBC AUTO: 46.8 FL (ref 37–54)
DEPRECATED RDW RBC AUTO: 46.9 FL (ref 37–54)
DEPRECATED RDW RBC AUTO: 46.9 FL (ref 37–54)
DEPRECATED RDW RBC AUTO: 47.3 FL (ref 37–54)
DEPRECATED RDW RBC AUTO: 47.3 FL (ref 37–54)
DEPRECATED RDW RBC AUTO: 47.5 FL (ref 37–54)
DEPRECATED RDW RBC AUTO: 48.1 FL (ref 37–54)
DEPRECATED RDW RBC AUTO: 48.4 FL (ref 37–54)
DEPRECATED RDW RBC AUTO: 48.7 FL (ref 37–54)
DEPRECATED RDW RBC AUTO: 49.4 FL (ref 37–54)
DEPRECATED RDW RBC AUTO: 50.6 FL (ref 37–54)
DEPRECATED RDW RBC AUTO: 50.7 FL (ref 37–54)
DEPRECATED RDW RBC AUTO: 50.8 FL (ref 37–54)
DEPRECATED RDW RBC AUTO: 51.1 FL (ref 37–54)
DEPRECATED RDW RBC AUTO: 52.3 FL (ref 37–54)
EOSINOPHIL # BLD AUTO: 0 10*3/MM3 (ref 0–0.4)
EOSINOPHIL # BLD AUTO: 0.01 10*3/MM3 (ref 0–0.4)
EOSINOPHIL # BLD AUTO: 0.01 10*3/MM3 (ref 0–0.4)
EOSINOPHIL # BLD AUTO: 0.02 10*3/MM3 (ref 0–0.4)
EOSINOPHIL # BLD AUTO: 0.05 10*3/MM3 (ref 0–0.4)
EOSINOPHIL # BLD AUTO: 0.08 10*3/MM3 (ref 0–0.4)
EOSINOPHIL # BLD AUTO: 0.09 10*3/MM3 (ref 0–0.4)
EOSINOPHIL # BLD AUTO: 0.11 10*3/MM3 (ref 0–0.4)
EOSINOPHIL # BLD AUTO: 0.11 10*3/MM3 (ref 0–0.4)
EOSINOPHIL # BLD AUTO: 0.13 10*3/MM3 (ref 0–0.4)
EOSINOPHIL # BLD AUTO: 0.18 10*3/MM3 (ref 0–0.4)
EOSINOPHIL # BLD AUTO: 0.18 10*3/MM3 (ref 0–0.4)
EOSINOPHIL # BLD AUTO: 0.21 10*3/MM3 (ref 0–0.4)
EOSINOPHIL # BLD AUTO: 0.21 10*3/MM3 (ref 0–0.4)
EOSINOPHIL # BLD AUTO: 0.24 10*3/MM3 (ref 0–0.4)
EOSINOPHIL # BLD AUTO: 0.32 10*3/MM3 (ref 0–0.4)
EOSINOPHIL # BLD AUTO: 0.33 10*3/MM3 (ref 0–0.4)
EOSINOPHIL NFR BLD AUTO: 0 % (ref 0.3–6.2)
EOSINOPHIL NFR BLD AUTO: 0.1 % (ref 0.3–6.2)
EOSINOPHIL NFR BLD AUTO: 0.4 % (ref 0.3–6.2)
EOSINOPHIL NFR BLD AUTO: 0.6 % (ref 0.3–6.2)
EOSINOPHIL NFR BLD AUTO: 0.9 % (ref 0.3–6.2)
EOSINOPHIL NFR BLD AUTO: 0.9 % (ref 0.3–6.2)
EOSINOPHIL NFR BLD AUTO: 1.1 % (ref 0.3–6.2)
EOSINOPHIL NFR BLD AUTO: 1.2 % (ref 0.3–6.2)
EOSINOPHIL NFR BLD AUTO: 1.4 % (ref 0.3–6.2)
EOSINOPHIL NFR BLD AUTO: 1.5 % (ref 0.3–6.2)
EOSINOPHIL NFR BLD AUTO: 1.8 % (ref 0.3–6.2)
EOSINOPHIL NFR BLD AUTO: 2.2 % (ref 0.3–6.2)
EOSINOPHIL NFR BLD AUTO: 2.6 % (ref 0.3–6.2)
EOSINOPHIL NFR BLD AUTO: 3.6 % (ref 0.3–6.2)
EOSINOPHIL NFR BLD AUTO: 4 % (ref 0.3–6.2)
ERYTHROCYTE [DISTWIDTH] IN BLOOD BY AUTOMATED COUNT: 13.1 % (ref 12.3–15.4)
ERYTHROCYTE [DISTWIDTH] IN BLOOD BY AUTOMATED COUNT: 13.2 % (ref 12.3–15.4)
ERYTHROCYTE [DISTWIDTH] IN BLOOD BY AUTOMATED COUNT: 13.3 % (ref 12.3–15.4)
ERYTHROCYTE [DISTWIDTH] IN BLOOD BY AUTOMATED COUNT: 13.3 % (ref 12.3–15.4)
ERYTHROCYTE [DISTWIDTH] IN BLOOD BY AUTOMATED COUNT: 13.4 % (ref 12.3–15.4)
ERYTHROCYTE [DISTWIDTH] IN BLOOD BY AUTOMATED COUNT: 13.5 % (ref 12.3–15.4)
ERYTHROCYTE [DISTWIDTH] IN BLOOD BY AUTOMATED COUNT: 13.6 % (ref 12.3–15.4)
ERYTHROCYTE [DISTWIDTH] IN BLOOD BY AUTOMATED COUNT: 13.8 % (ref 12.3–15.4)
ERYTHROCYTE [DISTWIDTH] IN BLOOD BY AUTOMATED COUNT: 14.1 % (ref 12.3–15.4)
ERYTHROCYTE [DISTWIDTH] IN BLOOD BY AUTOMATED COUNT: 14.6 % (ref 12.3–15.4)
ERYTHROCYTE [DISTWIDTH] IN BLOOD BY AUTOMATED COUNT: 14.6 % (ref 12.3–15.4)
ERYTHROCYTE [DISTWIDTH] IN BLOOD BY AUTOMATED COUNT: 14.7 % (ref 12.3–15.4)
ERYTHROCYTE [DISTWIDTH] IN BLOOD BY AUTOMATED COUNT: 14.7 % (ref 12.3–15.4)
FERRITIN SERPL-MCNC: 416 NG/ML (ref 13–150)
FLUAV H1 2009 PAND RNA NPH QL NAA+PROBE: NOT DETECTED
FLUAV H1 2009 PAND RNA NPH QL NAA+PROBE: NOT DETECTED
FLUAV H1 HA GENE NPH QL NAA+PROBE: NOT DETECTED
FLUAV H1 HA GENE NPH QL NAA+PROBE: NOT DETECTED
FLUAV H3 RNA NPH QL NAA+PROBE: NOT DETECTED
FLUAV H3 RNA NPH QL NAA+PROBE: NOT DETECTED
FLUAV SUBTYP SPEC NAA+PROBE: NOT DETECTED
FLUBV RNA ISLT QL NAA+PROBE: NOT DETECTED
GAS FLOW AIRWAY: 15 LPM
GAS FLOW AIRWAY: 3 LPM
GFR SERPL CREATININE-BSD FRML MDRD: 100 ML/MIN/1.73
GFR SERPL CREATININE-BSD FRML MDRD: 100 ML/MIN/1.73
GFR SERPL CREATININE-BSD FRML MDRD: 117 ML/MIN/1.73
GFR SERPL CREATININE-BSD FRML MDRD: 119 ML/MIN/1.73
GFR SERPL CREATININE-BSD FRML MDRD: 125 ML/MIN/1.73
GFR SERPL CREATININE-BSD FRML MDRD: 57 ML/MIN/1.73
GFR SERPL CREATININE-BSD FRML MDRD: 67 ML/MIN/1.73
GFR SERPL CREATININE-BSD FRML MDRD: 68 ML/MIN/1.73
GFR SERPL CREATININE-BSD FRML MDRD: 79 ML/MIN/1.73
GFR SERPL CREATININE-BSD FRML MDRD: 79 ML/MIN/1.73
GFR SERPL CREATININE-BSD FRML MDRD: 80 ML/MIN/1.73
GFR SERPL CREATININE-BSD FRML MDRD: 80 ML/MIN/1.73
GFR SERPL CREATININE-BSD FRML MDRD: 85 ML/MIN/1.73
GFR SERPL CREATININE-BSD FRML MDRD: 85 ML/MIN/1.73
GFR SERPL CREATININE-BSD FRML MDRD: 89 ML/MIN/1.73
GFR SERPL CREATININE-BSD FRML MDRD: 91 ML/MIN/1.73
GFR SERPL CREATININE-BSD FRML MDRD: 91 ML/MIN/1.73
GFR SERPL CREATININE-BSD FRML MDRD: 96 ML/MIN/1.73
GFR SERPL CREATININE-BSD FRML MDRD: 98 ML/MIN/1.73
GFR SERPL CREATININE-BSD FRML MDRD: >150 ML/MIN/1.73
GLOBULIN UR ELPH-MCNC: 2.6 GM/DL
GLOBULIN UR ELPH-MCNC: 2.7 GM/DL
GLOBULIN UR ELPH-MCNC: 2.8 GM/DL
GLOBULIN UR ELPH-MCNC: 2.9 GM/DL
GLOBULIN UR ELPH-MCNC: 2.9 GM/DL
GLOBULIN UR ELPH-MCNC: 3 GM/DL
GLOBULIN UR ELPH-MCNC: 3.1 GM/DL
GLUCOSE BLD-MCNC: 109 MG/DL (ref 65–99)
GLUCOSE BLD-MCNC: 111 MG/DL (ref 65–99)
GLUCOSE BLD-MCNC: 113 MG/DL (ref 65–99)
GLUCOSE BLD-MCNC: 113 MG/DL (ref 65–99)
GLUCOSE BLD-MCNC: 114 MG/DL (ref 65–99)
GLUCOSE BLD-MCNC: 116 MG/DL (ref 65–99)
GLUCOSE BLD-MCNC: 135 MG/DL (ref 65–99)
GLUCOSE BLD-MCNC: 141 MG/DL (ref 65–99)
GLUCOSE BLD-MCNC: 93 MG/DL (ref 65–99)
GLUCOSE BLD-MCNC: 94 MG/DL (ref 65–99)
GLUCOSE BLDC GLUCOMTR-MCNC: 102 MG/DL (ref 70–130)
GLUCOSE BLDC GLUCOMTR-MCNC: 104 MG/DL (ref 70–130)
GLUCOSE BLDC GLUCOMTR-MCNC: 115 MG/DL (ref 70–130)
GLUCOSE BLDC GLUCOMTR-MCNC: 123 MG/DL (ref 70–130)
GLUCOSE BLDC GLUCOMTR-MCNC: 133 MG/DL (ref 70–130)
GLUCOSE BLDC GLUCOMTR-MCNC: 86 MG/DL (ref 70–130)
GLUCOSE SERPL-MCNC: 101 MG/DL (ref 65–99)
GLUCOSE SERPL-MCNC: 106 MG/DL (ref 65–99)
GLUCOSE SERPL-MCNC: 123 MG/DL (ref 65–99)
GLUCOSE SERPL-MCNC: 133 MG/DL (ref 65–99)
GLUCOSE SERPL-MCNC: 154 MG/DL (ref 65–99)
GLUCOSE SERPL-MCNC: 52 MG/DL (ref 65–99)
GLUCOSE SERPL-MCNC: 81 MG/DL (ref 65–99)
GLUCOSE SERPL-MCNC: 83 MG/DL (ref 65–99)
GLUCOSE SERPL-MCNC: 86 MG/DL (ref 65–99)
GLUCOSE SERPL-MCNC: 87 MG/DL (ref 65–99)
GLUCOSE SERPL-MCNC: 91 MG/DL (ref 65–99)
GLUCOSE SERPL-MCNC: 96 MG/DL (ref 65–99)
GLUCOSE UR STRIP-MCNC: NEGATIVE MG/DL
HADV DNA SPEC NAA+PROBE: NOT DETECTED
HCO3 BLDA-SCNC: 22.6 MMOL/L (ref 22–28)
HCO3 BLDA-SCNC: 25.4 MMOL/L (ref 22–28)
HCO3 BLDA-SCNC: 26.3 MMOL/L (ref 22–28)
HCOV 229E RNA SPEC QL NAA+PROBE: NOT DETECTED
HCOV HKU1 RNA SPEC QL NAA+PROBE: NOT DETECTED
HCOV NL63 RNA SPEC QL NAA+PROBE: NOT DETECTED
HCOV OC43 RNA SPEC QL NAA+PROBE: NOT DETECTED
HCT VFR BLD AUTO: 23.8 % (ref 34–46.6)
HCT VFR BLD AUTO: 24 % (ref 34–46.6)
HCT VFR BLD AUTO: 24.4 % (ref 34–46.6)
HCT VFR BLD AUTO: 26.3 % (ref 34–46.6)
HCT VFR BLD AUTO: 30.8 % (ref 34–46.6)
HCT VFR BLD AUTO: 30.8 % (ref 34–46.6)
HCT VFR BLD AUTO: 33.4 % (ref 34–46.6)
HCT VFR BLD AUTO: 34.4 % (ref 34–46.6)
HCT VFR BLD AUTO: 34.5 % (ref 34–46.6)
HCT VFR BLD AUTO: 35 % (ref 34–46.6)
HCT VFR BLD AUTO: 35.5 % (ref 34–46.6)
HCT VFR BLD AUTO: 35.6 % (ref 34–46.6)
HCT VFR BLD AUTO: 37.5 % (ref 34–46.6)
HCT VFR BLD AUTO: 37.7 % (ref 34–46.6)
HCT VFR BLD AUTO: 37.8 % (ref 34–46.6)
HCT VFR BLD AUTO: 38.6 % (ref 34–46.6)
HCT VFR BLD AUTO: 38.8 % (ref 34–46.6)
HCT VFR BLD AUTO: 39 % (ref 34–46.6)
HCT VFR BLD AUTO: 40 % (ref 34–46.6)
HCT VFR BLD AUTO: 40.2 % (ref 34–46.6)
HCT VFR BLD AUTO: 40.5 % (ref 34–46.6)
HCT VFR BLD AUTO: 40.8 % (ref 34–46.6)
HCT VFR BLD AUTO: 41.4 % (ref 34–46.6)
HCT VFR BLD AUTO: 41.7 % (ref 34–46.6)
HCT VFR BLD AUTO: 43.4 % (ref 34–46.6)
HGB BLD-MCNC: 10.4 G/DL (ref 12–15.9)
HGB BLD-MCNC: 10.7 G/DL (ref 12–15.9)
HGB BLD-MCNC: 11.1 G/DL (ref 12–15.9)
HGB BLD-MCNC: 11.5 G/DL (ref 12–15.9)
HGB BLD-MCNC: 11.5 G/DL (ref 12–15.9)
HGB BLD-MCNC: 11.6 G/DL (ref 12–15.9)
HGB BLD-MCNC: 11.7 G/DL (ref 12–15.9)
HGB BLD-MCNC: 11.9 G/DL (ref 12–15.9)
HGB BLD-MCNC: 12.4 G/DL (ref 12–15.9)
HGB BLD-MCNC: 12.5 G/DL (ref 12–15.9)
HGB BLD-MCNC: 12.8 G/DL (ref 12–15.9)
HGB BLD-MCNC: 13 G/DL (ref 12–15.9)
HGB BLD-MCNC: 13.1 G/DL (ref 12–15.9)
HGB BLD-MCNC: 13.2 G/DL (ref 12–15.9)
HGB BLD-MCNC: 13.6 G/DL (ref 12–15.9)
HGB BLD-MCNC: 13.8 G/DL (ref 12–15.9)
HGB BLD-MCNC: 13.9 G/DL (ref 12–15.9)
HGB BLD-MCNC: 14 G/DL (ref 12–15.9)
HGB BLD-MCNC: 14 G/DL (ref 12–15.9)
HGB BLD-MCNC: 7.7 G/DL (ref 12–15.9)
HGB BLD-MCNC: 7.8 G/DL (ref 12–15.9)
HGB BLD-MCNC: 7.9 G/DL (ref 12–15.9)
HGB BLD-MCNC: 8.3 G/DL (ref 12–15.9)
HGB UR QL STRIP.AUTO: ABNORMAL
HGB UR QL STRIP.AUTO: ABNORMAL
HGB UR QL STRIP.AUTO: NEGATIVE
HMPV RNA NPH QL NAA+NON-PROBE: NOT DETECTED
HPIV1 RNA SPEC QL NAA+PROBE: NOT DETECTED
HPIV2 RNA SPEC QL NAA+PROBE: NOT DETECTED
HPIV3 RNA NPH QL NAA+PROBE: NOT DETECTED
HPIV4 P GENE NPH QL NAA+PROBE: NOT DETECTED
HYALINE CASTS UR QL AUTO: ABNORMAL /LPF
IMM GRANULOCYTES # BLD AUTO: 0.03 10*3/MM3 (ref 0–0.05)
IMM GRANULOCYTES # BLD AUTO: 0.07 10*3/MM3 (ref 0–0.05)
IMM GRANULOCYTES # BLD AUTO: 0.08 10*3/MM3 (ref 0–0.05)
IMM GRANULOCYTES # BLD AUTO: 0.09 10*3/MM3 (ref 0–0.05)
IMM GRANULOCYTES # BLD AUTO: 0.1 10*3/MM3 (ref 0–0.05)
IMM GRANULOCYTES # BLD AUTO: 0.1 10*3/MM3 (ref 0–0.05)
IMM GRANULOCYTES # BLD AUTO: 0.11 10*3/MM3 (ref 0–0.05)
IMM GRANULOCYTES # BLD AUTO: 0.11 10*3/MM3 (ref 0–0.05)
IMM GRANULOCYTES # BLD AUTO: 0.12 10*3/MM3 (ref 0–0.05)
IMM GRANULOCYTES # BLD AUTO: 0.24 10*3/MM3 (ref 0–0.05)
IMM GRANULOCYTES # BLD AUTO: 0.24 10*3/MM3 (ref 0–0.05)
IMM GRANULOCYTES # BLD AUTO: 0.29 10*3/MM3 (ref 0–0.05)
IMM GRANULOCYTES # BLD AUTO: 0.32 10*3/MM3 (ref 0–0.05)
IMM GRANULOCYTES # BLD AUTO: 0.33 10*3/MM3 (ref 0–0.05)
IMM GRANULOCYTES # BLD AUTO: 0.41 10*3/MM3 (ref 0–0.05)
IMM GRANULOCYTES # BLD AUTO: 0.46 10*3/MM3 (ref 0–0.05)
IMM GRANULOCYTES # BLD AUTO: 0.48 10*3/MM3 (ref 0–0.05)
IMM GRANULOCYTES # BLD AUTO: 0.52 10*3/MM3 (ref 0–0.05)
IMM GRANULOCYTES NFR BLD AUTO: 0.3 % (ref 0–0.5)
IMM GRANULOCYTES NFR BLD AUTO: 0.7 % (ref 0–0.5)
IMM GRANULOCYTES NFR BLD AUTO: 0.8 % (ref 0–0.5)
IMM GRANULOCYTES NFR BLD AUTO: 0.8 % (ref 0–0.5)
IMM GRANULOCYTES NFR BLD AUTO: 0.9 % (ref 0–0.5)
IMM GRANULOCYTES NFR BLD AUTO: 1.1 % (ref 0–0.5)
IMM GRANULOCYTES NFR BLD AUTO: 1.2 % (ref 0–0.5)
IMM GRANULOCYTES NFR BLD AUTO: 1.2 % (ref 0–0.5)
IMM GRANULOCYTES NFR BLD AUTO: 1.3 % (ref 0–0.5)
IMM GRANULOCYTES NFR BLD AUTO: 1.5 % (ref 0–0.5)
IMM GRANULOCYTES NFR BLD AUTO: 1.6 % (ref 0–0.5)
IMM GRANULOCYTES NFR BLD AUTO: 2 % (ref 0–0.5)
IMM GRANULOCYTES NFR BLD AUTO: 2.3 % (ref 0–0.5)
IMM GRANULOCYTES NFR BLD AUTO: 3.2 % (ref 0–0.5)
IMM GRANULOCYTES NFR BLD AUTO: 3.3 % (ref 0–0.5)
IMM GRANULOCYTES NFR BLD AUTO: 3.4 % (ref 0–0.5)
IMM GRANULOCYTES NFR BLD AUTO: 4.2 % (ref 0–0.5)
INHALED O2 CONCENTRATION: 100 %
INR PPP: 1.07 (ref 0.9–1.1)
KETONES UR QL STRIP: ABNORMAL
KETONES UR QL STRIP: NEGATIVE
KETONES UR QL STRIP: NEGATIVE
LACTATE HOLD SPECIMEN: NORMAL
LACTATE HOLD SPECIMEN: NORMAL
LDH SERPL-CCNC: 299 U/L (ref 135–214)
LEFT ATRIUM VOLUME INDEX: 19 ML/M2
LEFT ATRIUM VOLUME: 29 CM3
LEUKOCYTE ESTERASE UR QL STRIP.AUTO: ABNORMAL
LEUKOCYTE ESTERASE UR QL STRIP.AUTO: ABNORMAL
LEUKOCYTE ESTERASE UR QL STRIP.AUTO: NEGATIVE
LYMPHOCYTES # BLD AUTO: 0.27 10*3/MM3 (ref 0.7–3.1)
LYMPHOCYTES # BLD AUTO: 0.5 10*3/MM3 (ref 0.7–3.1)
LYMPHOCYTES # BLD AUTO: 0.66 10*3/MM3 (ref 0.7–3.1)
LYMPHOCYTES # BLD AUTO: 0.67 10*3/MM3 (ref 0.7–3.1)
LYMPHOCYTES # BLD AUTO: 0.81 10*3/MM3 (ref 0.7–3.1)
LYMPHOCYTES # BLD AUTO: 0.84 10*3/MM3 (ref 0.7–3.1)
LYMPHOCYTES # BLD AUTO: 0.84 10*3/MM3 (ref 0.7–3.1)
LYMPHOCYTES # BLD AUTO: 0.87 10*3/MM3 (ref 0.7–3.1)
LYMPHOCYTES # BLD AUTO: 1.07 10*3/MM3 (ref 0.7–3.1)
LYMPHOCYTES # BLD AUTO: 1.08 10*3/MM3 (ref 0.7–3.1)
LYMPHOCYTES # BLD AUTO: 1.12 10*3/MM3 (ref 0.7–3.1)
LYMPHOCYTES # BLD AUTO: 1.14 10*3/MM3 (ref 0.7–3.1)
LYMPHOCYTES # BLD AUTO: 1.32 10*3/MM3 (ref 0.7–3.1)
LYMPHOCYTES # BLD AUTO: 1.47 10*3/MM3 (ref 0.7–3.1)
LYMPHOCYTES # BLD AUTO: 1.62 10*3/MM3 (ref 0.7–3.1)
LYMPHOCYTES # BLD AUTO: 1.69 10*3/MM3 (ref 0.7–3.1)
LYMPHOCYTES # BLD AUTO: 1.74 10*3/MM3 (ref 0.7–3.1)
LYMPHOCYTES # BLD AUTO: 1.82 10*3/MM3 (ref 0.7–3.1)
LYMPHOCYTES # BLD AUTO: 2.12 10*3/MM3 (ref 0.7–3.1)
LYMPHOCYTES # BLD AUTO: 2.22 10*3/MM3 (ref 0.7–3.1)
LYMPHOCYTES NFR BLD AUTO: 1.4 % (ref 19.6–45.3)
LYMPHOCYTES NFR BLD AUTO: 10.6 % (ref 19.6–45.3)
LYMPHOCYTES NFR BLD AUTO: 11.5 % (ref 19.6–45.3)
LYMPHOCYTES NFR BLD AUTO: 13.9 % (ref 19.6–45.3)
LYMPHOCYTES NFR BLD AUTO: 15.8 % (ref 19.6–45.3)
LYMPHOCYTES NFR BLD AUTO: 15.9 % (ref 19.6–45.3)
LYMPHOCYTES NFR BLD AUTO: 18.5 % (ref 19.6–45.3)
LYMPHOCYTES NFR BLD AUTO: 23 % (ref 19.6–45.3)
LYMPHOCYTES NFR BLD AUTO: 23.9 % (ref 19.6–45.3)
LYMPHOCYTES NFR BLD AUTO: 3.4 % (ref 19.6–45.3)
LYMPHOCYTES NFR BLD AUTO: 3.5 % (ref 19.6–45.3)
LYMPHOCYTES NFR BLD AUTO: 4.1 % (ref 19.6–45.3)
LYMPHOCYTES NFR BLD AUTO: 5.9 % (ref 19.6–45.3)
LYMPHOCYTES NFR BLD AUTO: 6.1 % (ref 19.6–45.3)
LYMPHOCYTES NFR BLD AUTO: 6.8 % (ref 19.6–45.3)
LYMPHOCYTES NFR BLD AUTO: 7.9 % (ref 19.6–45.3)
LYMPHOCYTES NFR BLD AUTO: 8.5 % (ref 19.6–45.3)
LYMPHOCYTES NFR BLD AUTO: 9.4 % (ref 19.6–45.3)
M PNEUMO IGG SER IA-ACNC: NOT DETECTED
MAGNESIUM SERPL-MCNC: 1.9 MG/DL (ref 1.6–2.4)
MAGNESIUM SERPL-MCNC: 2.1 MG/DL (ref 1.6–2.4)
MAGNESIUM SERPL-MCNC: 2.1 MG/DL (ref 1.6–2.4)
MAXIMAL PREDICTED HEART RATE: 133 BPM
MCH RBC QN AUTO: 30.9 PG (ref 26.6–33)
MCH RBC QN AUTO: 31 PG (ref 26.6–33)
MCH RBC QN AUTO: 31.1 PG (ref 26.6–33)
MCH RBC QN AUTO: 31.3 PG (ref 26.6–33)
MCH RBC QN AUTO: 31.3 PG (ref 26.6–33)
MCH RBC QN AUTO: 31.5 PG (ref 26.6–33)
MCH RBC QN AUTO: 31.7 PG (ref 26.6–33)
MCH RBC QN AUTO: 31.8 PG (ref 26.6–33)
MCH RBC QN AUTO: 31.9 PG (ref 26.6–33)
MCH RBC QN AUTO: 32 PG (ref 26.6–33)
MCH RBC QN AUTO: 32.1 PG (ref 26.6–33)
MCH RBC QN AUTO: 32.2 PG (ref 26.6–33)
MCH RBC QN AUTO: 32.3 PG (ref 26.6–33)
MCH RBC QN AUTO: 32.5 PG (ref 26.6–33)
MCH RBC QN AUTO: 32.6 PG (ref 26.6–33)
MCHC RBC AUTO-ENTMCNC: 31.6 G/DL (ref 31.5–35.7)
MCHC RBC AUTO-ENTMCNC: 32 G/DL (ref 31.5–35.7)
MCHC RBC AUTO-ENTMCNC: 32.4 G/DL (ref 31.5–35.7)
MCHC RBC AUTO-ENTMCNC: 32.5 G/DL (ref 31.5–35.7)
MCHC RBC AUTO-ENTMCNC: 32.8 G/DL (ref 31.5–35.7)
MCHC RBC AUTO-ENTMCNC: 33.1 G/DL (ref 31.5–35.7)
MCHC RBC AUTO-ENTMCNC: 33.2 G/DL (ref 31.5–35.7)
MCHC RBC AUTO-ENTMCNC: 33.3 G/DL (ref 31.5–35.7)
MCHC RBC AUTO-ENTMCNC: 33.4 G/DL (ref 31.5–35.7)
MCHC RBC AUTO-ENTMCNC: 33.4 G/DL (ref 31.5–35.7)
MCHC RBC AUTO-ENTMCNC: 33.6 G/DL (ref 31.5–35.7)
MCHC RBC AUTO-ENTMCNC: 33.6 G/DL (ref 31.5–35.7)
MCHC RBC AUTO-ENTMCNC: 33.7 G/DL (ref 31.5–35.7)
MCHC RBC AUTO-ENTMCNC: 33.8 G/DL (ref 31.5–35.7)
MCHC RBC AUTO-ENTMCNC: 33.8 G/DL (ref 31.5–35.7)
MCHC RBC AUTO-ENTMCNC: 33.9 G/DL (ref 31.5–35.7)
MCHC RBC AUTO-ENTMCNC: 34 G/DL (ref 31.5–35.7)
MCHC RBC AUTO-ENTMCNC: 34.1 G/DL (ref 31.5–35.7)
MCHC RBC AUTO-ENTMCNC: 34.3 G/DL (ref 31.5–35.7)
MCHC RBC AUTO-ENTMCNC: 34.3 G/DL (ref 31.5–35.7)
MCHC RBC AUTO-ENTMCNC: 34.7 G/DL (ref 31.5–35.7)
MCV RBC AUTO: 91.4 FL (ref 79–97)
MCV RBC AUTO: 92.3 FL (ref 79–97)
MCV RBC AUTO: 92.9 FL (ref 79–97)
MCV RBC AUTO: 93.3 FL (ref 79–97)
MCV RBC AUTO: 93.5 FL (ref 79–97)
MCV RBC AUTO: 93.7 FL (ref 79–97)
MCV RBC AUTO: 93.8 FL (ref 79–97)
MCV RBC AUTO: 94.6 FL (ref 79–97)
MCV RBC AUTO: 94.8 FL (ref 79–97)
MCV RBC AUTO: 94.8 FL (ref 79–97)
MCV RBC AUTO: 94.9 FL (ref 79–97)
MCV RBC AUTO: 95.2 FL (ref 79–97)
MCV RBC AUTO: 95.3 FL (ref 79–97)
MCV RBC AUTO: 95.4 FL (ref 79–97)
MCV RBC AUTO: 95.5 FL (ref 79–97)
MCV RBC AUTO: 95.6 FL (ref 79–97)
MCV RBC AUTO: 95.6 FL (ref 79–97)
MCV RBC AUTO: 95.8 FL (ref 79–97)
MCV RBC AUTO: 95.8 FL (ref 79–97)
MCV RBC AUTO: 96.1 FL (ref 79–97)
MCV RBC AUTO: 96.5 FL (ref 79–97)
MCV RBC AUTO: 96.7 FL (ref 79–97)
MCV RBC AUTO: 97.3 FL (ref 79–97)
MCV RBC AUTO: 98.1 FL (ref 79–97)
MCV RBC AUTO: 99.8 FL (ref 79–97)
METHADONE UR QL SCN: NEGATIVE
MODALITY: ABNORMAL
MONOCYTES # BLD AUTO: 0.14 10*3/MM3 (ref 0.1–0.9)
MONOCYTES # BLD AUTO: 0.37 10*3/MM3 (ref 0.1–0.9)
MONOCYTES # BLD AUTO: 0.4 10*3/MM3 (ref 0.1–0.9)
MONOCYTES # BLD AUTO: 0.58 10*3/MM3 (ref 0.1–0.9)
MONOCYTES # BLD AUTO: 0.65 10*3/MM3 (ref 0.1–0.9)
MONOCYTES # BLD AUTO: 0.7 10*3/MM3 (ref 0.1–0.9)
MONOCYTES # BLD AUTO: 0.76 10*3/MM3 (ref 0.1–0.9)
MONOCYTES # BLD AUTO: 0.81 10*3/MM3 (ref 0.1–0.9)
MONOCYTES # BLD AUTO: 0.83 10*3/MM3 (ref 0.1–0.9)
MONOCYTES # BLD AUTO: 0.97 10*3/MM3 (ref 0.1–0.9)
MONOCYTES # BLD AUTO: 0.97 10*3/MM3 (ref 0.1–0.9)
MONOCYTES # BLD AUTO: 1.02 10*3/MM3 (ref 0.1–0.9)
MONOCYTES # BLD AUTO: 1.03 10*3/MM3 (ref 0.1–0.9)
MONOCYTES # BLD AUTO: 1.06 10*3/MM3 (ref 0.1–0.9)
MONOCYTES # BLD AUTO: 1.09 10*3/MM3 (ref 0.1–0.9)
MONOCYTES # BLD AUTO: 1.27 10*3/MM3 (ref 0.1–0.9)
MONOCYTES # BLD AUTO: 1.29 10*3/MM3 (ref 0.1–0.9)
MONOCYTES # BLD AUTO: 1.4 10*3/MM3 (ref 0.1–0.9)
MONOCYTES NFR BLD AUTO: 0.7 % (ref 5–12)
MONOCYTES NFR BLD AUTO: 10 % (ref 5–12)
MONOCYTES NFR BLD AUTO: 10 % (ref 5–12)
MONOCYTES NFR BLD AUTO: 10.7 % (ref 5–12)
MONOCYTES NFR BLD AUTO: 10.9 % (ref 5–12)
MONOCYTES NFR BLD AUTO: 11 % (ref 5–12)
MONOCYTES NFR BLD AUTO: 11.3 % (ref 5–12)
MONOCYTES NFR BLD AUTO: 11.5 % (ref 5–12)
MONOCYTES NFR BLD AUTO: 2 % (ref 5–12)
MONOCYTES NFR BLD AUTO: 3.6 % (ref 5–12)
MONOCYTES NFR BLD AUTO: 4.1 % (ref 5–12)
MONOCYTES NFR BLD AUTO: 5.1 % (ref 5–12)
MONOCYTES NFR BLD AUTO: 5.4 % (ref 5–12)
MONOCYTES NFR BLD AUTO: 6.5 % (ref 5–12)
MONOCYTES NFR BLD AUTO: 6.8 % (ref 5–12)
MONOCYTES NFR BLD AUTO: 7 % (ref 5–12)
MONOCYTES NFR BLD AUTO: 7 % (ref 5–12)
MONOCYTES NFR BLD AUTO: 8.3 % (ref 5–12)
MONOCYTES NFR BLD AUTO: 8.4 % (ref 5–12)
MONOCYTES NFR BLD AUTO: 9.5 % (ref 5–12)
MRSA DNA SPEC QL NAA+PROBE: ABNORMAL
MRSA DNA SPEC QL NAA+PROBE: ABNORMAL
NEUTROPHILS # BLD AUTO: 12.02 10*3/MM3 (ref 1.7–7)
NEUTROPHILS # BLD AUTO: 5.27 10*3/MM3 (ref 1.7–7)
NEUTROPHILS # BLD AUTO: 5.73 10*3/MM3 (ref 1.7–7)
NEUTROPHILS # BLD AUTO: 5.97 10*3/MM3 (ref 1.7–7)
NEUTROPHILS # BLD AUTO: 6.12 10*3/MM3 (ref 1.7–7)
NEUTROPHILS # BLD AUTO: 6.93 10*3/MM3 (ref 1.7–7)
NEUTROPHILS # BLD AUTO: 8.14 10*3/MM3 (ref 1.7–7)
NEUTROPHILS # BLD AUTO: 8.22 10*3/MM3 (ref 1.7–7)
NEUTROPHILS # BLD AUTO: 8.67 10*3/MM3 (ref 1.7–7)
NEUTROPHILS # BLD AUTO: 9.65 10*3/MM3 (ref 1.7–7)
NEUTROPHILS NFR BLD AUTO: 10.01 10*3/MM3 (ref 1.7–7)
NEUTROPHILS NFR BLD AUTO: 11.64 10*3/MM3 (ref 1.7–7)
NEUTROPHILS NFR BLD AUTO: 12.01 10*3/MM3 (ref 1.7–7)
NEUTROPHILS NFR BLD AUTO: 12.19 10*3/MM3 (ref 1.7–7)
NEUTROPHILS NFR BLD AUTO: 12.31 10*3/MM3 (ref 1.7–7)
NEUTROPHILS NFR BLD AUTO: 14.59 10*3/MM3 (ref 1.7–7)
NEUTROPHILS NFR BLD AUTO: 17.75 10*3/MM3 (ref 1.7–7)
NEUTROPHILS NFR BLD AUTO: 18.72 10*3/MM3 (ref 1.7–7)
NEUTROPHILS NFR BLD AUTO: 59.5 % (ref 42.7–76)
NEUTROPHILS NFR BLD AUTO: 61.9 % (ref 42.7–76)
NEUTROPHILS NFR BLD AUTO: 65.1 % (ref 42.7–76)
NEUTROPHILS NFR BLD AUTO: 68.8 % (ref 42.7–76)
NEUTROPHILS NFR BLD AUTO: 71.4 % (ref 42.7–76)
NEUTROPHILS NFR BLD AUTO: 71.4 % (ref 42.7–76)
NEUTROPHILS NFR BLD AUTO: 74.7 % (ref 42.7–76)
NEUTROPHILS NFR BLD AUTO: 75.7 % (ref 42.7–76)
NEUTROPHILS NFR BLD AUTO: 76.1 % (ref 42.7–76)
NEUTROPHILS NFR BLD AUTO: 78.7 % (ref 42.7–76)
NEUTROPHILS NFR BLD AUTO: 8.03 10*3/MM3 (ref 1.7–7)
NEUTROPHILS NFR BLD AUTO: 81.4 % (ref 42.7–76)
NEUTROPHILS NFR BLD AUTO: 82.6 % (ref 42.7–76)
NEUTROPHILS NFR BLD AUTO: 82.7 % (ref 42.7–76)
NEUTROPHILS NFR BLD AUTO: 83 % (ref 42.7–76)
NEUTROPHILS NFR BLD AUTO: 85.9 % (ref 42.7–76)
NEUTROPHILS NFR BLD AUTO: 86.2 % (ref 42.7–76)
NEUTROPHILS NFR BLD AUTO: 87 % (ref 42.7–76)
NEUTROPHILS NFR BLD AUTO: 9.1 10*3/MM3 (ref 1.7–7)
NEUTROPHILS NFR BLD AUTO: 90.5 % (ref 42.7–76)
NEUTROPHILS NFR BLD AUTO: 93.8 % (ref 42.7–76)
NEUTROPHILS NFR BLD AUTO: 95.1 % (ref 42.7–76)
NITRITE UR QL STRIP: NEGATIVE
NRBC BLD AUTO-RTO: 0 /100 WBC (ref 0–0.2)
NRBC BLD AUTO-RTO: 0.1 /100 WBC (ref 0–0.2)
NT-PROBNP SERPL-MCNC: 3583 PG/ML (ref 0–1800)
NT-PROBNP SERPL-MCNC: 5837 PG/ML (ref 0–1800)
NT-PROBNP SERPL-MCNC: 769.9 PG/ML (ref 0–1800)
O2 A-A PPRESDIFF RESPIRATORY: 0.1 MMHG
OPIATES UR QL: POSITIVE
OXYCODONE UR QL SCN: NEGATIVE
PCO2 BLDA: 28.6 MM HG (ref 35–45)
PCO2 BLDA: 36 MM HG (ref 35–45)
PCO2 BLDA: 38.3 MM HG (ref 35–45)
PH BLDA: 7.43 PH UNITS (ref 7.35–7.45)
PH BLDA: 7.47 PH UNITS (ref 7.35–7.45)
PH BLDA: 7.5 PH UNITS (ref 7.35–7.45)
PH UR STRIP.AUTO: 5.5 [PH] (ref 5–8)
PH UR STRIP.AUTO: 5.5 [PH] (ref 5–8)
PH UR STRIP.AUTO: 6 [PH] (ref 5–8)
PH UR STRIP.AUTO: 6 [PH] (ref 5–8)
PH UR STRIP.AUTO: <=5 [PH] (ref 5–8)
PHOSPHATE SERPL-MCNC: 2.2 MG/DL (ref 2.5–4.5)
PHOSPHATE SERPL-MCNC: 2.9 MG/DL (ref 2.5–4.5)
PLATELET # BLD AUTO: 162 10*3/MM3 (ref 140–450)
PLATELET # BLD AUTO: 166 10*3/MM3 (ref 140–450)
PLATELET # BLD AUTO: 186 10*3/MM3 (ref 140–450)
PLATELET # BLD AUTO: 194 10*3/MM3 (ref 140–450)
PLATELET # BLD AUTO: 199 10*3/MM3 (ref 140–450)
PLATELET # BLD AUTO: 210 10*3/MM3 (ref 140–450)
PLATELET # BLD AUTO: 214 10*3/MM3 (ref 140–450)
PLATELET # BLD AUTO: 218 10*3/MM3 (ref 140–450)
PLATELET # BLD AUTO: 221 10*3/MM3 (ref 140–450)
PLATELET # BLD AUTO: 224 10*3/MM3 (ref 140–450)
PLATELET # BLD AUTO: 224 10*3/MM3 (ref 140–450)
PLATELET # BLD AUTO: 225 10*3/MM3 (ref 140–450)
PLATELET # BLD AUTO: 225 10*3/MM3 (ref 140–450)
PLATELET # BLD AUTO: 227 10*3/MM3 (ref 140–450)
PLATELET # BLD AUTO: 228 10*3/MM3 (ref 140–450)
PLATELET # BLD AUTO: 230 10*3/MM3 (ref 140–450)
PLATELET # BLD AUTO: 231 10*3/MM3 (ref 140–450)
PLATELET # BLD AUTO: 232 10*3/MM3 (ref 140–450)
PLATELET # BLD AUTO: 238 10*3/MM3 (ref 140–450)
PLATELET # BLD AUTO: 239 10*3/MM3 (ref 140–450)
PLATELET # BLD AUTO: 270 10*3/MM3 (ref 140–450)
PLATELET # BLD AUTO: 270 10*3/MM3 (ref 140–450)
PLATELET # BLD AUTO: 279 10*3/MM3 (ref 140–450)
PLATELET # BLD AUTO: 281 10*3/MM3 (ref 140–450)
PLATELET # BLD AUTO: 301 10*3/MM3 (ref 140–450)
PMV BLD AUTO: 10.1 FL (ref 6–12)
PMV BLD AUTO: 10.1 FL (ref 6–12)
PMV BLD AUTO: 10.2 FL (ref 6–12)
PMV BLD AUTO: 10.3 FL (ref 6–12)
PMV BLD AUTO: 10.4 FL (ref 6–12)
PMV BLD AUTO: 10.5 FL (ref 6–12)
PMV BLD AUTO: 10.7 FL (ref 6–12)
PMV BLD AUTO: 10.7 FL (ref 6–12)
PMV BLD AUTO: 10.9 FL (ref 6–12)
PMV BLD AUTO: 11 FL (ref 6–12)
PMV BLD AUTO: 11.2 FL (ref 6–12)
PMV BLD AUTO: 9.2 FL (ref 6–12)
PMV BLD AUTO: 9.3 FL (ref 6–12)
PMV BLD AUTO: 9.6 FL (ref 6–12)
PMV BLD AUTO: 9.8 FL (ref 6–12)
PMV BLD AUTO: 9.8 FL (ref 6–12)
PMV BLD AUTO: 9.9 FL (ref 6–12)
PO2 BLDA: 211.7 MM HG (ref 80–100)
PO2 BLDA: 61.2 MM HG (ref 80–100)
PO2 BLDA: 63.5 MM HG (ref 80–100)
POTASSIUM BLD-SCNC: 3.2 MMOL/L (ref 3.5–5.2)
POTASSIUM BLD-SCNC: 3.3 MMOL/L (ref 3.5–5.2)
POTASSIUM BLD-SCNC: 3.4 MMOL/L (ref 3.5–5.2)
POTASSIUM BLD-SCNC: 3.4 MMOL/L (ref 3.5–5.2)
POTASSIUM BLD-SCNC: 3.6 MMOL/L (ref 3.5–5.2)
POTASSIUM BLD-SCNC: 3.8 MMOL/L (ref 3.5–5.2)
POTASSIUM BLD-SCNC: 3.9 MMOL/L (ref 3.5–5.2)
POTASSIUM BLD-SCNC: 4 MMOL/L (ref 3.5–5.2)
POTASSIUM BLD-SCNC: 4.1 MMOL/L (ref 3.5–5.2)
POTASSIUM BLD-SCNC: 4.3 MMOL/L (ref 3.5–5.2)
POTASSIUM BLD-SCNC: 5.3 MMOL/L (ref 3.5–5.2)
POTASSIUM SERPL-SCNC: 2.7 MMOL/L (ref 3.5–5.2)
POTASSIUM SERPL-SCNC: 3.3 MMOL/L (ref 3.5–5.2)
POTASSIUM SERPL-SCNC: 3.5 MMOL/L (ref 3.5–5.2)
POTASSIUM SERPL-SCNC: 3.8 MMOL/L (ref 3.5–5.2)
POTASSIUM SERPL-SCNC: 3.8 MMOL/L (ref 3.5–5.2)
POTASSIUM SERPL-SCNC: 4.1 MMOL/L (ref 3.5–5.2)
POTASSIUM SERPL-SCNC: 4.4 MMOL/L (ref 3.5–5.2)
POTASSIUM SERPL-SCNC: 4.4 MMOL/L (ref 3.5–5.2)
POTASSIUM SERPL-SCNC: 4.5 MMOL/L (ref 3.5–5.2)
POTASSIUM SERPL-SCNC: 4.9 MMOL/L (ref 3.5–5.2)
PROCALCITONIN SERPL-MCNC: 0.11 NG/ML (ref 0–0.25)
PROCALCITONIN SERPL-MCNC: 0.6 NG/ML (ref 0–0.25)
PROCALCITONIN SERPL-MCNC: 2.98 NG/ML (ref 0–0.25)
PROT SERPL-MCNC: 4.5 G/DL (ref 6–8.5)
PROT SERPL-MCNC: 4.7 G/DL (ref 6–8.5)
PROT SERPL-MCNC: 5 G/DL (ref 6–8.5)
PROT SERPL-MCNC: 5.3 G/DL (ref 6–8.5)
PROT SERPL-MCNC: 5.9 G/DL (ref 6–8.5)
PROT SERPL-MCNC: 6 G/DL (ref 6–8.5)
PROT SERPL-MCNC: 6.5 G/DL (ref 6–8.5)
PROT UR QL STRIP: ABNORMAL
PROT UR QL STRIP: NEGATIVE
PROT UR QL STRIP: NEGATIVE
PROTHROMBIN TIME: 13.8 SECONDS (ref 11.7–14.2)
RBC # BLD AUTO: 2.46 10*6/MM3 (ref 3.77–5.28)
RBC # BLD AUTO: 2.51 10*6/MM3 (ref 3.77–5.28)
RBC # BLD AUTO: 2.56 10*6/MM3 (ref 3.77–5.28)
RBC # BLD AUTO: 2.68 10*6/MM3 (ref 3.77–5.28)
RBC # BLD AUTO: 3.3 10*6/MM3 (ref 3.77–5.28)
RBC # BLD AUTO: 3.37 10*6/MM3 (ref 3.77–5.28)
RBC # BLD AUTO: 3.46 10*6/MM3 (ref 3.77–5.28)
RBC # BLD AUTO: 3.59 10*6/MM3 (ref 3.77–5.28)
RBC # BLD AUTO: 3.65 10*6/MM3 (ref 3.77–5.28)
RBC # BLD AUTO: 3.66 10*6/MM3 (ref 3.77–5.28)
RBC # BLD AUTO: 3.69 10*6/MM3 (ref 3.77–5.28)
RBC # BLD AUTO: 3.75 10*6/MM3 (ref 3.77–5.28)
RBC # BLD AUTO: 3.94 10*6/MM3 (ref 3.77–5.28)
RBC # BLD AUTO: 3.96 10*6/MM3 (ref 3.77–5.28)
RBC # BLD AUTO: 4.05 10*6/MM3 (ref 3.77–5.28)
RBC # BLD AUTO: 4.06 10*6/MM3 (ref 3.77–5.28)
RBC # BLD AUTO: 4.06 10*6/MM3 (ref 3.77–5.28)
RBC # BLD AUTO: 4.08 10*6/MM3 (ref 3.77–5.28)
RBC # BLD AUTO: 4.22 10*6/MM3 (ref 3.77–5.28)
RBC # BLD AUTO: 4.24 10*6/MM3 (ref 3.77–5.28)
RBC # BLD AUTO: 4.35 10*6/MM3 (ref 3.77–5.28)
RBC # BLD AUTO: 4.35 10*6/MM3 (ref 3.77–5.28)
RBC # BLD AUTO: 4.37 10*6/MM3 (ref 3.77–5.28)
RBC # BLD AUTO: 4.39 10*6/MM3 (ref 3.77–5.28)
RBC # BLD AUTO: 4.42 10*6/MM3 (ref 3.77–5.28)
RBC # UR: ABNORMAL /HPF
REF LAB TEST METHOD: ABNORMAL
RH BLD: POSITIVE
RH BLD: POSITIVE
RHINOVIRUS RNA SPEC NAA+PROBE: NOT DETECTED
RSV RNA NPH QL NAA+NON-PROBE: NOT DETECTED
SAO2 % BLDCOA: 91.8 % (ref 92–99)
SAO2 % BLDCOA: 93.4 % (ref 92–99)
SAO2 % BLDCOA: 99.8 % (ref 92–99)
SARS-COV-2 RNA NOSE QL NAA+PROBE: NOT DETECTED
SARS-COV-2 RNA NPH QL NAA+NON-PROBE: NOT DETECTED
SARS-COV-2 RNA RESP QL NAA+PROBE: DETECTED
SODIUM BLD-SCNC: 137 MMOL/L (ref 136–145)
SODIUM BLD-SCNC: 138 MMOL/L (ref 136–145)
SODIUM BLD-SCNC: 139 MMOL/L (ref 136–145)
SODIUM BLD-SCNC: 140 MMOL/L (ref 136–145)
SODIUM BLD-SCNC: 141 MMOL/L (ref 136–145)
SODIUM SERPL-SCNC: 135 MMOL/L (ref 136–145)
SODIUM SERPL-SCNC: 139 MMOL/L (ref 136–145)
SODIUM SERPL-SCNC: 141 MMOL/L (ref 136–145)
SODIUM SERPL-SCNC: 141 MMOL/L (ref 136–145)
SODIUM SERPL-SCNC: 142 MMOL/L (ref 136–145)
SODIUM SERPL-SCNC: 143 MMOL/L (ref 136–145)
SODIUM SERPL-SCNC: 143 MMOL/L (ref 136–145)
SODIUM SERPL-SCNC: 144 MMOL/L (ref 136–145)
SODIUM SERPL-SCNC: 144 MMOL/L (ref 136–145)
SP GR UR STRIP: 1.01 (ref 1–1.03)
SP GR UR STRIP: 1.01 (ref 1–1.03)
SP GR UR STRIP: 1.02 (ref 1–1.03)
SP GR UR STRIP: 1.03 (ref 1–1.03)
SP GR UR STRIP: 1.03 (ref 1–1.03)
SQUAMOUS #/AREA URNS HPF: ABNORMAL /HPF
STRESS TARGET HR: 113 BPM
T&S EXPIRATION DATE: NORMAL
T&S EXPIRATION DATE: NORMAL
T4 FREE SERPL-MCNC: 0.77 NG/DL (ref 0.93–1.7)
T4 FREE SERPL-MCNC: 0.86 NG/DL (ref 0.93–1.7)
TOTAL RATE: 18 BREATHS/MINUTE
TOTAL RATE: 24 BREATHS/MINUTE
TOTAL RATE: 28 BREATHS/MINUTE
TRANS CELLS #/AREA URNS HPF: ABNORMAL /HPF
TROPONIN T SERPL-MCNC: 0.04 NG/ML (ref 0–0.03)
TROPONIN T SERPL-MCNC: 0.05 NG/ML (ref 0–0.03)
TROPONIN T SERPL-MCNC: 0.11 NG/ML (ref 0–0.03)
TROPONIN T SERPL-MCNC: 0.16 NG/ML (ref 0–0.03)
TROPONIN T SERPL-MCNC: <0.01 NG/ML (ref 0–0.03)
TROPONIN T SERPL-MCNC: <0.01 NG/ML (ref 0–0.03)
TSH SERPL DL<=0.05 MIU/L-ACNC: 0.47 UIU/ML (ref 0.27–4.2)
TSH SERPL DL<=0.05 MIU/L-ACNC: 10.6 UIU/ML (ref 0.27–4.2)
TSH SERPL DL<=0.05 MIU/L-ACNC: 15 UIU/ML (ref 0.27–4.2)
TSH SERPL DL<=0.05 MIU/L-ACNC: 35.4 UIU/ML (ref 0.27–4.2)
UROBILINOGEN UR QL STRIP: ABNORMAL
UROBILINOGEN UR QL STRIP: NORMAL
VANCOMYCIN TROUGH SERPL-MCNC: 9 MCG/ML (ref 5–20)
WBC # BLD AUTO: 10.43 10*3/MM3 (ref 3.4–10.8)
WBC # BLD AUTO: 11.55 10*3/MM3 (ref 3.4–10.8)
WBC # BLD AUTO: 12.31 10*3/MM3 (ref 3.4–10.8)
WBC # BLD AUTO: 13.93 10*3/MM3 (ref 3.4–10.8)
WBC # BLD AUTO: 14.2 10*3/MM3 (ref 3.4–10.8)
WBC # BLD AUTO: 14.29 10*3/MM3 (ref 3.4–10.8)
WBC # BLD AUTO: 14.48 10*3/MM3 (ref 3.4–10.8)
WBC # BLD AUTO: 15.29 10*3/MM3 (ref 3.4–10.8)
WBC # BLD AUTO: 16.12 10*3/MM3 (ref 3.4–10.8)
WBC # BLD AUTO: 18.67 10*3/MM3 (ref 3.4–10.8)
WBC # BLD AUTO: 18.89 10*3/MM3 (ref 3.4–10.8)
WBC # BLD AUTO: 19.93 10*3/MM3 (ref 3.4–10.8)
WBC # BLD AUTO: 32.85 10*3/MM3 (ref 3.4–10.8)
WBC # BLD AUTO: 8.53 10*3/MM3 (ref 3.4–10.8)
WBC # BLD AUTO: 9.73 10*3/MM3 (ref 3.4–10.8)
WBC NRBC COR # BLD: 11.42 10*3/MM3 (ref 3.4–10.8)
WBC NRBC COR # BLD: 12.15 10*3/MM3 (ref 3.4–10.8)
WBC NRBC COR # BLD: 12.75 10*3/MM3 (ref 3.4–10.8)
WBC NRBC COR # BLD: 13.8 10*3/MM3 (ref 3.4–10.8)
WBC NRBC COR # BLD: 8.33 10*3/MM3 (ref 3.4–10.8)
WBC NRBC COR # BLD: 8.87 10*3/MM3 (ref 3.4–10.8)
WBC NRBC COR # BLD: 9.27 10*3/MM3 (ref 3.4–10.8)
WBC NRBC COR # BLD: 9.39 10*3/MM3 (ref 3.4–10.8)
WBC NRBC COR # BLD: 9.65 10*3/MM3 (ref 3.4–10.8)
WBC NRBC COR # BLD: 9.94 10*3/MM3 (ref 3.4–10.8)
WBC UR QL AUTO: ABNORMAL /HPF

## 2020-01-01 PROCEDURE — 82565 ASSAY OF CREATININE: CPT | Performed by: INTERNAL MEDICINE

## 2020-01-01 PROCEDURE — 87077 CULTURE AEROBIC IDENTIFY: CPT | Performed by: INTERNAL MEDICINE

## 2020-01-01 PROCEDURE — 83735 ASSAY OF MAGNESIUM: CPT | Performed by: EMERGENCY MEDICINE

## 2020-01-01 PROCEDURE — 25010000002 HYDRALAZINE PER 20 MG: Performed by: NURSE PRACTITIONER

## 2020-01-01 PROCEDURE — 99285 EMERGENCY DEPT VISIT HI MDM: CPT

## 2020-01-01 PROCEDURE — 80053 COMPREHEN METABOLIC PANEL: CPT | Performed by: EMERGENCY MEDICINE

## 2020-01-01 PROCEDURE — 87147 CULTURE TYPE IMMUNOLOGIC: CPT | Performed by: EMERGENCY MEDICINE

## 2020-01-01 PROCEDURE — 80307 DRUG TEST PRSMV CHEM ANLYZR: CPT | Performed by: HOSPITALIST

## 2020-01-01 PROCEDURE — 94799 UNLISTED PULMONARY SVC/PX: CPT

## 2020-01-01 PROCEDURE — 63710000001 DEXAMETHASONE PER 0.25 MG: Performed by: INTERNAL MEDICINE

## 2020-01-01 PROCEDURE — 85025 COMPLETE CBC W/AUTO DIFF WBC: CPT | Performed by: EMERGENCY MEDICINE

## 2020-01-01 PROCEDURE — 84484 ASSAY OF TROPONIN QUANT: CPT | Performed by: EMERGENCY MEDICINE

## 2020-01-01 PROCEDURE — 83880 ASSAY OF NATRIURETIC PEPTIDE: CPT | Performed by: NURSE PRACTITIONER

## 2020-01-01 PROCEDURE — 25010000002 LORAZEPAM PER 2 MG: Performed by: HOSPITALIST

## 2020-01-01 PROCEDURE — 97110 THERAPEUTIC EXERCISES: CPT

## 2020-01-01 PROCEDURE — 25010000002 VANCOMYCIN 10 G RECONSTITUTED SOLUTION: Performed by: EMERGENCY MEDICINE

## 2020-01-01 PROCEDURE — 92610 EVALUATE SWALLOWING FUNCTION: CPT

## 2020-01-01 PROCEDURE — 85025 COMPLETE CBC W/AUTO DIFF WBC: CPT | Performed by: INTERNAL MEDICINE

## 2020-01-01 PROCEDURE — 80048 BASIC METABOLIC PNL TOTAL CA: CPT | Performed by: INTERNAL MEDICINE

## 2020-01-01 PROCEDURE — 25010000002 FUROSEMIDE PER 20 MG: Performed by: INTERNAL MEDICINE

## 2020-01-01 PROCEDURE — 86901 BLOOD TYPING SEROLOGIC RH(D): CPT | Performed by: INTERNAL MEDICINE

## 2020-01-01 PROCEDURE — 25010000002 HALOPERIDOL LACTATE PER 5 MG: Performed by: EMERGENCY MEDICINE

## 2020-01-01 PROCEDURE — 87040 BLOOD CULTURE FOR BACTERIA: CPT | Performed by: INTERNAL MEDICINE

## 2020-01-01 PROCEDURE — 63710000001 PREDNISONE PER 5 MG: Performed by: INTERNAL MEDICINE

## 2020-01-01 PROCEDURE — 63710000001 PREDNISONE PER 1 MG: Performed by: INTERNAL MEDICINE

## 2020-01-01 PROCEDURE — 82803 BLOOD GASES ANY COMBINATION: CPT

## 2020-01-01 PROCEDURE — 25010000002 HALOPERIDOL LACTATE PER 5 MG: Performed by: PSYCHIATRY & NEUROLOGY

## 2020-01-01 PROCEDURE — 81001 URINALYSIS AUTO W/SCOPE: CPT | Performed by: INTERNAL MEDICINE

## 2020-01-01 PROCEDURE — P9612 CATHETERIZE FOR URINE SPEC: HCPCS

## 2020-01-01 PROCEDURE — 87185 SC STD ENZYME DETCJ PER NZM: CPT | Performed by: INTERNAL MEDICINE

## 2020-01-01 PROCEDURE — 87641 MR-STAPH DNA AMP PROBE: CPT | Performed by: HOSPITALIST

## 2020-01-01 PROCEDURE — 25010000002 METHYLPREDNISOLONE PER 125 MG: Performed by: INTERNAL MEDICINE

## 2020-01-01 PROCEDURE — 25010000002 MORPHINE PER 10 MG: Performed by: PSYCHIATRY & NEUROLOGY

## 2020-01-01 PROCEDURE — 82550 ASSAY OF CK (CPK): CPT | Performed by: INTERNAL MEDICINE

## 2020-01-01 PROCEDURE — 94762 N-INVAS EAR/PLS OXIMTRY CONT: CPT

## 2020-01-01 PROCEDURE — 99232 SBSQ HOSP IP/OBS MODERATE 35: CPT | Performed by: INTERNAL MEDICINE

## 2020-01-01 PROCEDURE — 97166 OT EVAL MOD COMPLEX 45 MIN: CPT

## 2020-01-01 PROCEDURE — U0004 COV-19 TEST NON-CDC HGH THRU: HCPCS | Performed by: INTERNAL MEDICINE

## 2020-01-01 PROCEDURE — 85027 COMPLETE CBC AUTOMATED: CPT | Performed by: INTERNAL MEDICINE

## 2020-01-01 PROCEDURE — 25810000003 SODIUM CHLORIDE 0.9 % WITH KCL 20 MEQ 20-0.9 MEQ/L-% SOLUTION: Performed by: INTERNAL MEDICINE

## 2020-01-01 PROCEDURE — 99231 SBSQ HOSP IP/OBS SF/LOW 25: CPT | Performed by: NURSE PRACTITIONER

## 2020-01-01 PROCEDURE — 80053 COMPREHEN METABOLIC PANEL: CPT | Performed by: HOSPITALIST

## 2020-01-01 PROCEDURE — 85610 PROTHROMBIN TIME: CPT | Performed by: EMERGENCY MEDICINE

## 2020-01-01 PROCEDURE — 80069 RENAL FUNCTION PANEL: CPT | Performed by: INTERNAL MEDICINE

## 2020-01-01 PROCEDURE — 99204 OFFICE O/P NEW MOD 45 MIN: CPT | Performed by: PSYCHIATRY & NEUROLOGY

## 2020-01-01 PROCEDURE — G0378 HOSPITAL OBSERVATION PER HR: HCPCS

## 2020-01-01 PROCEDURE — 84145 PROCALCITONIN (PCT): CPT | Performed by: EMERGENCY MEDICINE

## 2020-01-01 PROCEDURE — 25010000002 VANCOMYCIN PER 500 MG: Performed by: HOSPITALIST

## 2020-01-01 PROCEDURE — 25010000002 ENOXAPARIN PER 10 MG: Performed by: INTERNAL MEDICINE

## 2020-01-01 PROCEDURE — 73560 X-RAY EXAM OF KNEE 1 OR 2: CPT

## 2020-01-01 PROCEDURE — 84439 ASSAY OF FREE THYROXINE: CPT | Performed by: EMERGENCY MEDICINE

## 2020-01-01 PROCEDURE — 87040 BLOOD CULTURE FOR BACTERIA: CPT | Performed by: EMERGENCY MEDICINE

## 2020-01-01 PROCEDURE — 71045 X-RAY EXAM CHEST 1 VIEW: CPT

## 2020-01-01 PROCEDURE — 84132 ASSAY OF SERUM POTASSIUM: CPT | Performed by: HOSPITALIST

## 2020-01-01 PROCEDURE — 93005 ELECTROCARDIOGRAM TRACING: CPT | Performed by: INTERNAL MEDICINE

## 2020-01-01 PROCEDURE — 81001 URINALYSIS AUTO W/SCOPE: CPT | Performed by: HOSPITALIST

## 2020-01-01 PROCEDURE — 25010000002 FENTANYL CITRATE (PF) 100 MCG/2ML SOLUTION: Performed by: ANESTHESIOLOGY

## 2020-01-01 PROCEDURE — 83735 ASSAY OF MAGNESIUM: CPT | Performed by: HOSPITALIST

## 2020-01-01 PROCEDURE — 94660 CPAP INITIATION&MGMT: CPT

## 2020-01-01 PROCEDURE — 87186 SC STD MICRODIL/AGAR DIL: CPT | Performed by: EMERGENCY MEDICINE

## 2020-01-01 PROCEDURE — 99233 SBSQ HOSP IP/OBS HIGH 50: CPT | Performed by: INTERNAL MEDICINE

## 2020-01-01 PROCEDURE — 93010 ELECTROCARDIOGRAM REPORT: CPT | Performed by: INTERNAL MEDICINE

## 2020-01-01 PROCEDURE — 36600 WITHDRAWAL OF ARTERIAL BLOOD: CPT

## 2020-01-01 PROCEDURE — 83615 LACTATE (LD) (LDH) ENZYME: CPT | Performed by: EMERGENCY MEDICINE

## 2020-01-01 PROCEDURE — 86140 C-REACTIVE PROTEIN: CPT | Performed by: INTERNAL MEDICINE

## 2020-01-01 PROCEDURE — 25010000002 PIPERACILLIN SOD-TAZOBACTAM PER 1 G: Performed by: INTERNAL MEDICINE

## 2020-01-01 PROCEDURE — 81003 URINALYSIS AUTO W/O SCOPE: CPT | Performed by: EMERGENCY MEDICINE

## 2020-01-01 PROCEDURE — 82330 ASSAY OF CALCIUM: CPT | Performed by: HOSPITALIST

## 2020-01-01 PROCEDURE — 99231 SBSQ HOSP IP/OBS SF/LOW 25: CPT | Performed by: PSYCHIATRY & NEUROLOGY

## 2020-01-01 PROCEDURE — 70553 MRI BRAIN STEM W/O & W/DYE: CPT

## 2020-01-01 PROCEDURE — 0202U NFCT DS 22 TRGT SARS-COV-2: CPT | Performed by: EMERGENCY MEDICINE

## 2020-01-01 PROCEDURE — 82962 GLUCOSE BLOOD TEST: CPT

## 2020-01-01 PROCEDURE — 25010000002 PIPERACILLIN SOD-TAZOBACTAM PER 1 G: Performed by: NURSE PRACTITIONER

## 2020-01-01 PROCEDURE — 25010000003 HYDROCORTISONE SOD SUCCINATE PF 250 MG RECONSTITUTED SOLUTION: Performed by: INTERNAL MEDICINE

## 2020-01-01 PROCEDURE — 81001 URINALYSIS AUTO W/SCOPE: CPT | Performed by: NURSE PRACTITIONER

## 2020-01-01 PROCEDURE — 83735 ASSAY OF MAGNESIUM: CPT | Performed by: INTERNAL MEDICINE

## 2020-01-01 PROCEDURE — 82565 ASSAY OF CREATININE: CPT | Performed by: HOSPITALIST

## 2020-01-01 PROCEDURE — 25010000002 HYDROCORTISONE SODIUM SUCCINATE 100 MG RECONSTITUTED SOLUTION: Performed by: INTERNAL MEDICINE

## 2020-01-01 PROCEDURE — 36415 COLL VENOUS BLD VENIPUNCTURE: CPT

## 2020-01-01 PROCEDURE — 83605 ASSAY OF LACTIC ACID: CPT | Performed by: EMERGENCY MEDICINE

## 2020-01-01 PROCEDURE — 97535 SELF CARE MNGMENT TRAINING: CPT

## 2020-01-01 PROCEDURE — 96360 HYDRATION IV INFUSION INIT: CPT

## 2020-01-01 PROCEDURE — 87150 DNA/RNA AMPLIFIED PROBE: CPT | Performed by: INTERNAL MEDICINE

## 2020-01-01 PROCEDURE — 84100 ASSAY OF PHOSPHORUS: CPT | Performed by: HOSPITALIST

## 2020-01-01 PROCEDURE — 99291 CRITICAL CARE FIRST HOUR: CPT

## 2020-01-01 PROCEDURE — 85730 THROMBOPLASTIN TIME PARTIAL: CPT | Performed by: EMERGENCY MEDICINE

## 2020-01-01 PROCEDURE — 99223 1ST HOSP IP/OBS HIGH 75: CPT | Performed by: INTERNAL MEDICINE

## 2020-01-01 PROCEDURE — 99284 EMERGENCY DEPT VISIT MOD MDM: CPT

## 2020-01-01 PROCEDURE — 83605 ASSAY OF LACTIC ACID: CPT | Performed by: NURSE PRACTITIONER

## 2020-01-01 PROCEDURE — 83605 ASSAY OF LACTIC ACID: CPT | Performed by: HOSPITALIST

## 2020-01-01 PROCEDURE — 97530 THERAPEUTIC ACTIVITIES: CPT

## 2020-01-01 PROCEDURE — 97162 PT EVAL MOD COMPLEX 30 MIN: CPT

## 2020-01-01 PROCEDURE — 85025 COMPLETE CBC W/AUTO DIFF WBC: CPT | Performed by: HOSPITALIST

## 2020-01-01 PROCEDURE — 25010000002 VANCOMYCIN 10 G RECONSTITUTED SOLUTION: Performed by: INTERNAL MEDICINE

## 2020-01-01 PROCEDURE — 0 GADOBENATE DIMEGLUMINE 529 MG/ML SOLUTION: Performed by: HOSPITALIST

## 2020-01-01 PROCEDURE — 0202U NFCT DS 22 TRGT SARS-COV-2: CPT | Performed by: NURSE PRACTITIONER

## 2020-01-01 PROCEDURE — 83880 ASSAY OF NATRIURETIC PEPTIDE: CPT | Performed by: EMERGENCY MEDICINE

## 2020-01-01 PROCEDURE — 25010000002 HYDRALAZINE PER 20 MG: Performed by: EMERGENCY MEDICINE

## 2020-01-01 PROCEDURE — 87150 DNA/RNA AMPLIFIED PROBE: CPT | Performed by: EMERGENCY MEDICINE

## 2020-01-01 PROCEDURE — 25010000002 DIPHENHYDRAMINE PER 50 MG: Performed by: EMERGENCY MEDICINE

## 2020-01-01 PROCEDURE — 84484 ASSAY OF TROPONIN QUANT: CPT | Performed by: INTERNAL MEDICINE

## 2020-01-01 PROCEDURE — 84443 ASSAY THYROID STIM HORMONE: CPT | Performed by: HOSPITALIST

## 2020-01-01 PROCEDURE — 02HV33Z INSERTION OF INFUSION DEVICE INTO SUPERIOR VENA CAVA, PERCUTANEOUS APPROACH: ICD-10-PCS | Performed by: INTERNAL MEDICINE

## 2020-01-01 PROCEDURE — 85025 COMPLETE CBC W/AUTO DIFF WBC: CPT | Performed by: NURSE PRACTITIONER

## 2020-01-01 PROCEDURE — 25010000002 LORAZEPAM PER 2 MG: Performed by: EMERGENCY MEDICINE

## 2020-01-01 PROCEDURE — 93005 ELECTROCARDIOGRAM TRACING: CPT | Performed by: NURSE PRACTITIONER

## 2020-01-01 PROCEDURE — 99222 1ST HOSP IP/OBS MODERATE 55: CPT | Performed by: NURSE PRACTITIONER

## 2020-01-01 PROCEDURE — 70450 CT HEAD/BRAIN W/O DYE: CPT

## 2020-01-01 PROCEDURE — 87186 SC STD MICRODIL/AGAR DIL: CPT | Performed by: INTERNAL MEDICINE

## 2020-01-01 PROCEDURE — 86850 RBC ANTIBODY SCREEN: CPT | Performed by: EMERGENCY MEDICINE

## 2020-01-01 PROCEDURE — 80202 ASSAY OF VANCOMYCIN: CPT | Performed by: HOSPITALIST

## 2020-01-01 PROCEDURE — 25010000002 LORAZEPAM PER 2 MG: Performed by: PSYCHIATRY & NEUROLOGY

## 2020-01-01 PROCEDURE — 93306 TTE W/DOPPLER COMPLETE: CPT | Performed by: INTERNAL MEDICINE

## 2020-01-01 PROCEDURE — 87635 SARS-COV-2 COVID-19 AMP PRB: CPT | Performed by: INTERNAL MEDICINE

## 2020-01-01 PROCEDURE — 80053 COMPREHEN METABOLIC PANEL: CPT | Performed by: INTERNAL MEDICINE

## 2020-01-01 PROCEDURE — 84443 ASSAY THYROID STIM HORMONE: CPT | Performed by: EMERGENCY MEDICINE

## 2020-01-01 PROCEDURE — 86900 BLOOD TYPING SEROLOGIC ABO: CPT | Performed by: INTERNAL MEDICINE

## 2020-01-01 PROCEDURE — 87040 BLOOD CULTURE FOR BACTERIA: CPT | Performed by: NURSE PRACTITIONER

## 2020-01-01 PROCEDURE — 87086 URINE CULTURE/COLONY COUNT: CPT | Performed by: INTERNAL MEDICINE

## 2020-01-01 PROCEDURE — 86901 BLOOD TYPING SEROLOGIC RH(D): CPT | Performed by: EMERGENCY MEDICINE

## 2020-01-01 PROCEDURE — 83605 ASSAY OF LACTIC ACID: CPT | Performed by: INTERNAL MEDICINE

## 2020-01-01 PROCEDURE — 99214 OFFICE O/P EST MOD 30 MIN: CPT | Performed by: INTERNAL MEDICINE

## 2020-01-01 PROCEDURE — 93005 ELECTROCARDIOGRAM TRACING: CPT | Performed by: EMERGENCY MEDICINE

## 2020-01-01 PROCEDURE — 25010000002 CEFEPIME PER 500 MG: Performed by: INTERNAL MEDICINE

## 2020-01-01 PROCEDURE — 84443 ASSAY THYROID STIM HORMONE: CPT | Performed by: INTERNAL MEDICINE

## 2020-01-01 PROCEDURE — 25010000002 AZITHROMYCIN PER 500 MG: Performed by: NURSE PRACTITIONER

## 2020-01-01 PROCEDURE — 25010000002 ROPIVACAINE PER 1 MG: Performed by: ANESTHESIOLOGY

## 2020-01-01 PROCEDURE — 94640 AIRWAY INHALATION TREATMENT: CPT

## 2020-01-01 PROCEDURE — 3E0U33Z INTRODUCTION OF ANTI-INFLAMMATORY INTO JOINTS, PERCUTANEOUS APPROACH: ICD-10-PCS | Performed by: NURSE PRACTITIONER

## 2020-01-01 PROCEDURE — 36415 COLL VENOUS BLD VENIPUNCTURE: CPT | Performed by: NURSE PRACTITIONER

## 2020-01-01 PROCEDURE — 77002 NEEDLE LOCALIZATION BY XRAY: CPT

## 2020-01-01 PROCEDURE — 92526 ORAL FUNCTION THERAPY: CPT

## 2020-01-01 PROCEDURE — 86140 C-REACTIVE PROTEIN: CPT | Performed by: EMERGENCY MEDICINE

## 2020-01-01 PROCEDURE — 25010000002 VANCOMYCIN 10 G RECONSTITUTED SOLUTION: Performed by: HOSPITALIST

## 2020-01-01 PROCEDURE — 87641 MR-STAPH DNA AMP PROBE: CPT | Performed by: INTERNAL MEDICINE

## 2020-01-01 PROCEDURE — 86850 RBC ANTIBODY SCREEN: CPT | Performed by: INTERNAL MEDICINE

## 2020-01-01 PROCEDURE — 80053 COMPREHEN METABOLIC PANEL: CPT | Performed by: NURSE PRACTITIONER

## 2020-01-01 PROCEDURE — 84484 ASSAY OF TROPONIN QUANT: CPT | Performed by: NURSE PRACTITIONER

## 2020-01-01 PROCEDURE — U0002 COVID-19 LAB TEST NON-CDC: HCPCS | Performed by: INTERNAL MEDICINE

## 2020-01-01 PROCEDURE — 86900 BLOOD TYPING SEROLOGIC ABO: CPT | Performed by: EMERGENCY MEDICINE

## 2020-01-01 PROCEDURE — 84145 PROCALCITONIN (PCT): CPT | Performed by: NURSE PRACTITIONER

## 2020-01-01 PROCEDURE — A9577 INJ MULTIHANCE: HCPCS | Performed by: HOSPITALIST

## 2020-01-01 PROCEDURE — 82728 ASSAY OF FERRITIN: CPT | Performed by: INTERNAL MEDICINE

## 2020-01-01 PROCEDURE — 93306 TTE W/DOPPLER COMPLETE: CPT

## 2020-01-01 PROCEDURE — 25010000003 HYDROCORTISONE SOD SUCCINATE PF 250 MG RECONSTITUTED SOLUTION: Performed by: EMERGENCY MEDICINE

## 2020-01-01 PROCEDURE — 25010000002 CEFEPIME PER 500 MG: Performed by: EMERGENCY MEDICINE

## 2020-01-01 PROCEDURE — 82553 CREATINE MB FRACTION: CPT | Performed by: INTERNAL MEDICINE

## 2020-01-01 PROCEDURE — 90791 PSYCH DIAGNOSTIC EVALUATION: CPT | Performed by: SOCIAL WORKER

## 2020-01-01 PROCEDURE — 87076 CULTURE ANAEROBE IDENT EACH: CPT | Performed by: INTERNAL MEDICINE

## 2020-01-01 RX ORDER — SODIUM CHLORIDE 0.9 % (FLUSH) 0.9 %
20 SYRINGE (ML) INJECTION AS NEEDED
Status: DISCONTINUED | OUTPATIENT
Start: 2020-01-01 | End: 2020-10-23 | Stop reason: HOSPADM

## 2020-01-01 RX ORDER — LIDOCAINE 50 MG/G
1 PATCH TOPICAL DAILY PRN
Status: DISCONTINUED | OUTPATIENT
Start: 2020-01-01 | End: 2020-01-01 | Stop reason: HOSPADM

## 2020-01-01 RX ORDER — ACETAMINOPHEN 325 MG/1
650 TABLET ORAL EVERY 4 HOURS PRN
Status: DISCONTINUED | OUTPATIENT
Start: 2020-01-01 | End: 2020-01-01

## 2020-01-01 RX ORDER — MULTIPLE VITAMINS W/ MINERALS TAB 9MG-400MCG
1 TAB ORAL DAILY
Status: DISCONTINUED | OUTPATIENT
Start: 2020-01-01 | End: 2020-01-01 | Stop reason: HOSPADM

## 2020-01-01 RX ORDER — SODIUM CHLORIDE 0.9 % (FLUSH) 0.9 %
10 SYRINGE (ML) INJECTION EVERY 12 HOURS SCHEDULED
Status: DISCONTINUED | OUTPATIENT
Start: 2020-01-01 | End: 2020-10-23 | Stop reason: HOSPADM

## 2020-01-01 RX ORDER — BUDESONIDE AND FORMOTEROL FUMARATE DIHYDRATE 160; 4.5 UG/1; UG/1
2 AEROSOL RESPIRATORY (INHALATION)
Status: DISCONTINUED | OUTPATIENT
Start: 2020-01-01 | End: 2020-01-01 | Stop reason: HOSPADM

## 2020-01-01 RX ORDER — ALPRAZOLAM 1 MG/1
1 TABLET ORAL
Status: DISCONTINUED | OUTPATIENT
Start: 2020-01-01 | End: 2020-01-01

## 2020-01-01 RX ORDER — LORAZEPAM 2 MG/ML
1 INJECTION INTRAMUSCULAR EVERY 8 HOURS
Status: DISCONTINUED | OUTPATIENT
Start: 2020-01-01 | End: 2020-01-01

## 2020-01-01 RX ORDER — ACETAMINOPHEN AND CODEINE PHOSPHATE 300; 30 MG/1; MG/1
TABLET ORAL
COMMUNITY
Start: 2020-01-01 | End: 2020-01-01 | Stop reason: HOSPADM

## 2020-01-01 RX ORDER — TRAZODONE HYDROCHLORIDE 50 MG/1
50 TABLET ORAL NIGHTLY
Status: DISCONTINUED | OUTPATIENT
Start: 2020-01-01 | End: 2020-01-01

## 2020-01-01 RX ORDER — ALBUTEROL SULFATE 2.5 MG/3ML
2.5 SOLUTION RESPIRATORY (INHALATION)
Status: DISCONTINUED | OUTPATIENT
Start: 2020-01-01 | End: 2020-01-01

## 2020-01-01 RX ORDER — PREDNISONE 20 MG/1
20 TABLET ORAL
Status: DISCONTINUED | OUTPATIENT
Start: 2020-01-01 | End: 2020-01-01

## 2020-01-01 RX ORDER — AMOXICILLIN AND CLAVULANATE POTASSIUM 500; 125 MG/1; MG/1
1 TABLET, FILM COATED ORAL EVERY 12 HOURS SCHEDULED
Status: COMPLETED | OUTPATIENT
Start: 2020-01-01 | End: 2020-01-01

## 2020-01-01 RX ORDER — LIDOCAINE HYDROCHLORIDE 10 MG/ML
10 INJECTION, SOLUTION INFILTRATION; PERINEURAL ONCE
Status: DISCONTINUED | OUTPATIENT
Start: 2020-01-01 | End: 2020-01-01 | Stop reason: HOSPADM

## 2020-01-01 RX ORDER — OLANZAPINE 10 MG/1
10 TABLET ORAL
Status: DISCONTINUED | OUTPATIENT
Start: 2020-01-01 | End: 2020-01-01 | Stop reason: HOSPADM

## 2020-01-01 RX ORDER — MELATONIN
1000 DAILY
Status: DISCONTINUED | OUTPATIENT
Start: 2020-01-01 | End: 2020-01-01 | Stop reason: HOSPADM

## 2020-01-01 RX ORDER — ALPRAZOLAM 1 MG/1
1 TABLET ORAL EVERY 6 HOURS PRN
COMMUNITY

## 2020-01-01 RX ORDER — PANTOPRAZOLE SODIUM 40 MG/1
40 TABLET, DELAYED RELEASE ORAL EVERY MORNING
Status: DISCONTINUED | OUTPATIENT
Start: 2020-01-01 | End: 2020-01-01 | Stop reason: HOSPADM

## 2020-01-01 RX ORDER — ALPRAZOLAM 1 MG/1
1 TABLET ORAL 3 TIMES DAILY
Status: DISCONTINUED | OUTPATIENT
Start: 2020-01-01 | End: 2020-01-01 | Stop reason: HOSPADM

## 2020-01-01 RX ORDER — SODIUM CHLORIDE 0.9 % (FLUSH) 0.9 %
10 SYRINGE (ML) INJECTION AS NEEDED
Status: DISCONTINUED | OUTPATIENT
Start: 2020-01-01 | End: 2020-01-01 | Stop reason: HOSPADM

## 2020-01-01 RX ORDER — SENNA PLUS 8.6 MG/1
2 TABLET ORAL 2 TIMES DAILY
Status: DISCONTINUED | OUTPATIENT
Start: 2020-01-01 | End: 2020-01-01 | Stop reason: HOSPADM

## 2020-01-01 RX ORDER — ASPIRIN 81 MG/1
81 TABLET, CHEWABLE ORAL DAILY
Status: DISCONTINUED | OUTPATIENT
Start: 2020-01-01 | End: 2020-10-23 | Stop reason: HOSPADM

## 2020-01-01 RX ORDER — DIPHENHYDRAMINE HYDROCHLORIDE 50 MG/ML
25 INJECTION INTRAMUSCULAR; INTRAVENOUS ONCE
Status: COMPLETED | OUTPATIENT
Start: 2020-01-01 | End: 2020-01-01

## 2020-01-01 RX ORDER — PREDNISONE 1 MG/1
5 TABLET ORAL
Status: DISCONTINUED | OUTPATIENT
Start: 2020-01-01 | End: 2020-01-01 | Stop reason: HOSPADM

## 2020-01-01 RX ORDER — PANTOPRAZOLE SODIUM 40 MG/10ML
40 INJECTION, POWDER, LYOPHILIZED, FOR SOLUTION INTRAVENOUS
Status: DISCONTINUED | OUTPATIENT
Start: 2020-01-01 | End: 2020-10-23 | Stop reason: HOSPADM

## 2020-01-01 RX ORDER — POTASSIUM CHLORIDE 7.45 MG/ML
10 INJECTION INTRAVENOUS
Status: DISCONTINUED | OUTPATIENT
Start: 2020-01-01 | End: 2020-01-01 | Stop reason: HOSPADM

## 2020-01-01 RX ORDER — NAPROXEN 250 MG/1
375 TABLET ORAL 2 TIMES DAILY PRN
Status: DISCONTINUED | OUTPATIENT
Start: 2020-01-01 | End: 2020-01-01 | Stop reason: HOSPADM

## 2020-01-01 RX ORDER — MORPHINE SULFATE 2 MG/ML
1 INJECTION, SOLUTION INTRAMUSCULAR; INTRAVENOUS EVERY 6 HOURS
Status: DISCONTINUED | OUTPATIENT
Start: 2020-01-01 | End: 2020-01-01

## 2020-01-01 RX ORDER — SODIUM CHLORIDE AND POTASSIUM CHLORIDE 150; 900 MG/100ML; MG/100ML
75 INJECTION, SOLUTION INTRAVENOUS CONTINUOUS
Status: DISCONTINUED | OUTPATIENT
Start: 2020-01-01 | End: 2020-01-01

## 2020-01-01 RX ORDER — LORAZEPAM 2 MG/ML
1 INJECTION INTRAMUSCULAR ONCE
Status: COMPLETED | OUTPATIENT
Start: 2020-01-01 | End: 2020-01-01

## 2020-01-01 RX ORDER — POTASSIUM CHLORIDE 750 MG/1
20 CAPSULE, EXTENDED RELEASE ORAL DAILY
Status: DISCONTINUED | OUTPATIENT
Start: 2020-01-01 | End: 2020-01-01 | Stop reason: HOSPADM

## 2020-01-01 RX ORDER — LOSARTAN POTASSIUM 50 MG/1
50 TABLET ORAL
Status: DISCONTINUED | OUTPATIENT
Start: 2020-01-01 | End: 2020-01-01

## 2020-01-01 RX ORDER — LORAZEPAM 1 MG/1
1 TABLET ORAL 2 TIMES DAILY
Qty: 11 TABLET | Refills: 0 | Status: SHIPPED | OUTPATIENT
Start: 2020-01-01 | End: 2020-01-01

## 2020-01-01 RX ORDER — POTASSIUM CHLORIDE 750 MG/1
40 CAPSULE, EXTENDED RELEASE ORAL AS NEEDED
Status: DISCONTINUED | OUTPATIENT
Start: 2020-01-01 | End: 2020-01-01 | Stop reason: HOSPADM

## 2020-01-01 RX ORDER — OLANZAPINE 7.5 MG/1
7.5 TABLET ORAL
Status: DISCONTINUED | OUTPATIENT
Start: 2020-01-01 | End: 2020-01-01 | Stop reason: HOSPADM

## 2020-01-01 RX ORDER — PREDNISONE 1 MG/1
5 TABLET ORAL
Status: DISCONTINUED | OUTPATIENT
Start: 2020-01-01 | End: 2020-01-01

## 2020-01-01 RX ORDER — SODIUM CHLORIDE 0.9 % (FLUSH) 0.9 %
3 SYRINGE (ML) INJECTION EVERY 12 HOURS SCHEDULED
Status: DISCONTINUED | OUTPATIENT
Start: 2020-01-01 | End: 2020-01-01 | Stop reason: HOSPADM

## 2020-01-01 RX ORDER — PHENYLEPHRINE HCL IN 0.9% NACL 0.5 MG/5ML
.5-3 SYRINGE (ML) INTRAVENOUS
Status: DISCONTINUED | OUTPATIENT
Start: 2020-01-01 | End: 2020-01-01

## 2020-01-01 RX ORDER — FUROSEMIDE 10 MG/ML
40 INJECTION INTRAMUSCULAR; INTRAVENOUS ONCE
Status: DISCONTINUED | OUTPATIENT
Start: 2020-01-01 | End: 2020-01-01

## 2020-01-01 RX ORDER — LEVOTHYROXINE SODIUM 112 UG/1
112 TABLET ORAL DAILY
Status: DISCONTINUED | OUTPATIENT
Start: 2020-01-01 | End: 2020-01-01 | Stop reason: HOSPADM

## 2020-01-01 RX ORDER — TRAZODONE HYDROCHLORIDE 100 MG/1
100 TABLET ORAL NIGHTLY
COMMUNITY

## 2020-01-01 RX ORDER — IPRATROPIUM BROMIDE AND ALBUTEROL SULFATE 2.5; .5 MG/3ML; MG/3ML
3 SOLUTION RESPIRATORY (INHALATION)
Status: DISCONTINUED | OUTPATIENT
Start: 2020-01-01 | End: 2020-01-01

## 2020-01-01 RX ORDER — SODIUM CHLORIDE 0.9 % (FLUSH) 0.9 %
3-10 SYRINGE (ML) INJECTION AS NEEDED
Status: DISCONTINUED | OUTPATIENT
Start: 2020-01-01 | End: 2020-01-01 | Stop reason: HOSPADM

## 2020-01-01 RX ORDER — METHYLPREDNISOLONE SODIUM SUCCINATE 125 MG/2ML
80 INJECTION, POWDER, LYOPHILIZED, FOR SOLUTION INTRAMUSCULAR; INTRAVENOUS ONCE
Status: COMPLETED | OUTPATIENT
Start: 2020-01-01 | End: 2020-01-01

## 2020-01-01 RX ORDER — PREDNISONE 10 MG/1
10 TABLET ORAL DAILY
Qty: 30 TABLET | Refills: 0 | Status: SHIPPED | OUTPATIENT
Start: 2020-01-01

## 2020-01-01 RX ORDER — AMLODIPINE BESYLATE 5 MG/1
5 TABLET ORAL 2 TIMES DAILY
COMMUNITY

## 2020-01-01 RX ORDER — IPRATROPIUM BROMIDE AND ALBUTEROL SULFATE 2.5; .5 MG/3ML; MG/3ML
3 SOLUTION RESPIRATORY (INHALATION) ONCE
Status: COMPLETED | OUTPATIENT
Start: 2020-01-01 | End: 2020-01-01

## 2020-01-01 RX ORDER — PHENOBARBITAL, HYOSCYAMINE SULFATE, ATROPINE SULFATE AND SCOPOLAMINE HYDROBROMIDE .0194; .1037; 16.2; .0065 MG/1; MG/1; MG/1; MG/1
TABLET ORAL
Status: ON HOLD | COMMUNITY
End: 2020-01-01

## 2020-01-01 RX ORDER — METOPROLOL SUCCINATE 25 MG/1
25 TABLET, EXTENDED RELEASE ORAL EVERY EVENING
Status: DISCONTINUED | OUTPATIENT
Start: 2020-01-01 | End: 2020-01-01

## 2020-01-01 RX ORDER — ACETAMINOPHEN 325 MG/1
650 TABLET ORAL EVERY 4 HOURS PRN
Status: DISCONTINUED | OUTPATIENT
Start: 2020-01-01 | End: 2020-01-01 | Stop reason: HOSPADM

## 2020-01-01 RX ORDER — LORAZEPAM 1 MG/1
1 TABLET ORAL 2 TIMES DAILY
Status: DISCONTINUED | OUTPATIENT
Start: 2020-01-01 | End: 2020-01-01 | Stop reason: HOSPADM

## 2020-01-01 RX ORDER — OLANZAPINE 10 MG/1
5 INJECTION, POWDER, LYOPHILIZED, FOR SOLUTION INTRAMUSCULAR EVERY 8 HOURS PRN
Status: DISCONTINUED | OUTPATIENT
Start: 2020-01-01 | End: 2020-01-01 | Stop reason: HOSPADM

## 2020-01-01 RX ORDER — PREDNISONE 10 MG/1
10 TABLET ORAL
Status: DISCONTINUED | OUTPATIENT
Start: 2020-01-01 | End: 2020-01-01 | Stop reason: HOSPADM

## 2020-01-01 RX ORDER — NALOXEGOL OXALATE 25 MG/1
TABLET, FILM COATED ORAL
Qty: 30 TABLET | Refills: 5 | Status: ON HOLD | OUTPATIENT
Start: 2020-01-01 | End: 2020-01-01

## 2020-01-01 RX ORDER — OLANZAPINE 10 MG/1
10 TABLET ORAL
Status: DISCONTINUED | OUTPATIENT
Start: 2020-01-01 | End: 2020-01-01

## 2020-01-01 RX ORDER — HYDROCODONE BITARTRATE AND ACETAMINOPHEN 5; 325 MG/1; MG/1
1 TABLET ORAL EVERY 4 HOURS PRN
Status: DISCONTINUED | OUTPATIENT
Start: 2020-01-01 | End: 2020-01-01

## 2020-01-01 RX ORDER — ALBUTEROL SULFATE 90 UG/1
2 AEROSOL, METERED RESPIRATORY (INHALATION) EVERY 4 HOURS PRN
Qty: 1 INHALER | Refills: 0 | Status: SHIPPED | OUTPATIENT
Start: 2020-01-01

## 2020-01-01 RX ORDER — LOSARTAN POTASSIUM 50 MG/1
50 TABLET ORAL 2 TIMES DAILY
COMMUNITY

## 2020-01-01 RX ORDER — ASPIRIN 81 MG/1
81 TABLET, CHEWABLE ORAL DAILY
Start: 2020-01-01

## 2020-01-01 RX ORDER — FUROSEMIDE 10 MG/ML
40 INJECTION INTRAMUSCULAR; INTRAVENOUS ONCE
Status: COMPLETED | OUTPATIENT
Start: 2020-01-01 | End: 2020-01-01

## 2020-01-01 RX ORDER — AMLODIPINE BESYLATE 5 MG/1
5 TABLET ORAL 2 TIMES DAILY
Status: DISCONTINUED | OUTPATIENT
Start: 2020-01-01 | End: 2020-01-01 | Stop reason: HOSPADM

## 2020-01-01 RX ORDER — ROPIVACAINE HYDROCHLORIDE 5 MG/ML
INJECTION, SOLUTION EPIDURAL; INFILTRATION; PERINEURAL
Status: COMPLETED | OUTPATIENT
Start: 2020-01-01 | End: 2020-01-01

## 2020-01-01 RX ORDER — DOXYCYCLINE 100 MG/1
100 CAPSULE ORAL EVERY 12 HOURS SCHEDULED
Status: DISCONTINUED | OUTPATIENT
Start: 2020-01-01 | End: 2020-01-01

## 2020-01-01 RX ORDER — POLYETHYLENE GLYCOL 3350 17 G/17G
17 POWDER, FOR SOLUTION ORAL DAILY
Status: DISCONTINUED | OUTPATIENT
Start: 2020-01-01 | End: 2020-01-01 | Stop reason: HOSPADM

## 2020-01-01 RX ORDER — AMLODIPINE BESYLATE 5 MG/1
5 TABLET ORAL
Status: DISCONTINUED | OUTPATIENT
Start: 2020-01-01 | End: 2020-01-01

## 2020-01-01 RX ORDER — FENTANYL CITRATE 50 UG/ML
50 INJECTION, SOLUTION INTRAMUSCULAR; INTRAVENOUS AS NEEDED
Status: DISCONTINUED | OUTPATIENT
Start: 2020-01-01 | End: 2020-01-01 | Stop reason: HOSPADM

## 2020-01-01 RX ORDER — LORAZEPAM 2 MG/ML
0.25 INJECTION INTRAMUSCULAR EVERY 4 HOURS PRN
Status: DISCONTINUED | OUTPATIENT
Start: 2020-01-01 | End: 2020-01-01

## 2020-01-01 RX ORDER — HYDRALAZINE HYDROCHLORIDE 20 MG/ML
10 INJECTION INTRAMUSCULAR; INTRAVENOUS ONCE
Status: COMPLETED | OUTPATIENT
Start: 2020-01-01 | End: 2020-01-01

## 2020-01-01 RX ORDER — SODIUM CHLORIDE 0.9 % (FLUSH) 0.9 %
10 SYRINGE (ML) INJECTION AS NEEDED
Status: DISCONTINUED | OUTPATIENT
Start: 2020-01-01 | End: 2020-10-23 | Stop reason: HOSPADM

## 2020-01-01 RX ORDER — LEFLUNOMIDE 20 MG/1
20 TABLET ORAL DAILY
Status: DISCONTINUED | OUTPATIENT
Start: 2020-01-01 | End: 2020-01-01 | Stop reason: HOSPADM

## 2020-01-01 RX ORDER — POTASSIUM CHLORIDE 1.5 G/1.77G
40 POWDER, FOR SOLUTION ORAL AS NEEDED
Status: DISCONTINUED | OUTPATIENT
Start: 2020-01-01 | End: 2020-01-01 | Stop reason: HOSPADM

## 2020-01-01 RX ORDER — ASCORBIC ACID 500 MG
1000 TABLET ORAL DAILY
Status: DISCONTINUED | OUTPATIENT
Start: 2020-01-01 | End: 2020-01-01 | Stop reason: HOSPADM

## 2020-01-01 RX ORDER — DIPHENOXYLATE HYDROCHLORIDE AND ATROPINE SULFATE 2.5; .025 MG/1; MG/1
TABLET ORAL
COMMUNITY
End: 2020-01-01 | Stop reason: HOSPADM

## 2020-01-01 RX ORDER — TRIAMCINOLONE ACETONIDE 40 MG/ML
40 INJECTION, SUSPENSION INTRA-ARTICULAR; INTRAMUSCULAR ONCE
Status: DISCONTINUED | OUTPATIENT
Start: 2020-01-01 | End: 2020-01-01 | Stop reason: HOSPADM

## 2020-01-01 RX ORDER — LOSARTAN POTASSIUM 50 MG/1
50 TABLET ORAL ONCE
Status: COMPLETED | OUTPATIENT
Start: 2020-01-01 | End: 2020-01-01

## 2020-01-01 RX ORDER — ACETAMINOPHEN 650 MG/1
650 SUPPOSITORY RECTAL EVERY 4 HOURS PRN
Status: DISCONTINUED | OUTPATIENT
Start: 2020-01-01 | End: 2020-10-23 | Stop reason: HOSPADM

## 2020-01-01 RX ORDER — FUROSEMIDE 20 MG/1
20 TABLET ORAL DAILY
Status: DISCONTINUED | OUTPATIENT
Start: 2020-01-01 | End: 2020-01-01 | Stop reason: HOSPADM

## 2020-01-01 RX ORDER — ACETAMINOPHEN AND CODEINE PHOSPHATE 300; 30 MG/1; MG/1
1 TABLET ORAL EVERY 4 HOURS PRN
Status: DISCONTINUED | OUTPATIENT
Start: 2020-01-01 | End: 2020-01-01

## 2020-01-01 RX ORDER — OXYCODONE HYDROCHLORIDE AND ACETAMINOPHEN 5; 325 MG/1; MG/1
1 TABLET ORAL EVERY 4 HOURS PRN
COMMUNITY

## 2020-01-01 RX ORDER — IPRATROPIUM BROMIDE AND ALBUTEROL SULFATE 2.5; .5 MG/3ML; MG/3ML
3 SOLUTION RESPIRATORY (INHALATION) EVERY 6 HOURS PRN
Status: DISCONTINUED | OUTPATIENT
Start: 2020-01-01 | End: 2020-01-01 | Stop reason: HOSPADM

## 2020-01-01 RX ORDER — ACETAMINOPHEN AND CODEINE PHOSPHATE 300; 30 MG/1; MG/1
2 TABLET ORAL EVERY 6 HOURS PRN
COMMUNITY

## 2020-01-01 RX ORDER — PHENYLEPHRINE HCL IN 0.9% NACL 0.5 MG/5ML
.5-3 SYRINGE (ML) INTRAVENOUS
Status: DISCONTINUED | OUTPATIENT
Start: 2020-01-01 | End: 2020-01-01 | Stop reason: SDUPTHER

## 2020-01-01 RX ORDER — HYDRALAZINE HYDROCHLORIDE 20 MG/ML
10 INJECTION INTRAMUSCULAR; INTRAVENOUS ONCE AS NEEDED
Status: DISCONTINUED | OUTPATIENT
Start: 2020-01-01 | End: 2020-01-01 | Stop reason: HOSPADM

## 2020-01-01 RX ORDER — PREDNISONE 20 MG/1
20 TABLET ORAL ONCE
Status: COMPLETED | OUTPATIENT
Start: 2020-01-01 | End: 2020-01-01

## 2020-01-01 RX ORDER — ALBUTEROL SULFATE 90 UG/1
2 AEROSOL, METERED RESPIRATORY (INHALATION) EVERY 4 HOURS PRN
Status: DISCONTINUED | OUTPATIENT
Start: 2020-01-01 | End: 2020-01-01 | Stop reason: HOSPADM

## 2020-01-01 RX ORDER — FUROSEMIDE 20 MG/1
20 TABLET ORAL DAILY
Status: DISCONTINUED | OUTPATIENT
Start: 2020-01-01 | End: 2020-01-01

## 2020-01-01 RX ORDER — OLANZAPINE 5 MG/1
5 TABLET ORAL
Status: DISCONTINUED | OUTPATIENT
Start: 2020-01-01 | End: 2020-01-01

## 2020-01-01 RX ORDER — HALOPERIDOL 5 MG/ML
1 INJECTION INTRAMUSCULAR ONCE AS NEEDED
Status: COMPLETED | OUTPATIENT
Start: 2020-01-01 | End: 2020-01-01

## 2020-01-01 RX ORDER — SODIUM CHLORIDE 9 MG/ML
50 INJECTION, SOLUTION INTRAVENOUS CONTINUOUS
Status: DISCONTINUED | OUTPATIENT
Start: 2020-01-01 | End: 2020-10-23 | Stop reason: HOSPADM

## 2020-01-01 RX ORDER — HALOPERIDOL 5 MG/ML
2 INJECTION INTRAMUSCULAR
Status: DISCONTINUED | OUTPATIENT
Start: 2020-01-01 | End: 2020-01-01

## 2020-01-01 RX ORDER — LOSARTAN POTASSIUM 100 MG/1
100 TABLET ORAL
Status: DISCONTINUED | OUTPATIENT
Start: 2020-01-01 | End: 2020-01-01 | Stop reason: HOSPADM

## 2020-01-01 RX ORDER — VANCOMYCIN HYDROCHLORIDE 1 G/200ML
1000 INJECTION, SOLUTION INTRAVENOUS EVERY 24 HOURS
Status: COMPLETED | OUTPATIENT
Start: 2020-01-01 | End: 2020-01-01

## 2020-01-01 RX ORDER — FUROSEMIDE 40 MG/1
40 TABLET ORAL DAILY
Status: DISCONTINUED | OUTPATIENT
Start: 2020-01-01 | End: 2020-01-01

## 2020-01-01 RX ORDER — LEVOTHYROXINE SODIUM 112 UG/1
112 TABLET ORAL
Status: DISCONTINUED | OUTPATIENT
Start: 2020-01-01 | End: 2020-01-01 | Stop reason: HOSPADM

## 2020-01-01 RX ORDER — ACETAMINOPHEN 500 MG
1000 TABLET ORAL EVERY 8 HOURS
Status: DISCONTINUED | OUTPATIENT
Start: 2020-01-01 | End: 2020-01-01 | Stop reason: HOSPADM

## 2020-01-01 RX ORDER — ACETAMINOPHEN 650 MG/1
650 SUPPOSITORY RECTAL ONCE
Status: COMPLETED | OUTPATIENT
Start: 2020-01-01 | End: 2020-01-01

## 2020-01-01 RX ORDER — HALOPERIDOL 5 MG/ML
1 INJECTION INTRAMUSCULAR ONCE
Status: COMPLETED | OUTPATIENT
Start: 2020-01-01 | End: 2020-01-01

## 2020-01-01 RX ORDER — SODIUM CHLORIDE 0.9 % (FLUSH) 0.9 %
10 SYRINGE (ML) INJECTION EVERY 12 HOURS SCHEDULED
Status: DISCONTINUED | OUTPATIENT
Start: 2020-01-01 | End: 2020-01-01 | Stop reason: HOSPADM

## 2020-01-01 RX ORDER — ACETAMINOPHEN 325 MG/1
650 TABLET ORAL EVERY 4 HOURS PRN
Status: DISCONTINUED | OUTPATIENT
Start: 2020-01-01 | End: 2020-10-23 | Stop reason: HOSPADM

## 2020-01-01 RX ORDER — HYDROMORPHONE HYDROCHLORIDE 2 MG/1
1 TABLET ORAL EVERY 4 HOURS PRN
Status: DISCONTINUED | OUTPATIENT
Start: 2020-01-01 | End: 2020-01-01

## 2020-01-01 RX ORDER — METOPROLOL SUCCINATE 25 MG/1
25 TABLET, EXTENDED RELEASE ORAL EVERY EVENING
Status: DISCONTINUED | OUTPATIENT
Start: 2020-01-01 | End: 2020-01-01 | Stop reason: HOSPADM

## 2020-01-01 RX ORDER — DILTIAZEM HYDROCHLORIDE 240 MG/1
240 CAPSULE, COATED, EXTENDED RELEASE ORAL
Status: DISCONTINUED | OUTPATIENT
Start: 2020-01-01 | End: 2020-01-01 | Stop reason: HOSPADM

## 2020-01-01 RX ORDER — LIDOCAINE HYDROCHLORIDE 10 MG/ML
1 INJECTION, SOLUTION INFILTRATION; PERINEURAL ONCE
Status: DISCONTINUED | OUTPATIENT
Start: 2020-01-01 | End: 2020-01-01 | Stop reason: HOSPADM

## 2020-01-01 RX ORDER — FUROSEMIDE 20 MG/1
20 TABLET ORAL ONCE
Status: COMPLETED | OUTPATIENT
Start: 2020-01-01 | End: 2020-01-01

## 2020-01-01 RX ORDER — DIPHENOXYLATE HYDROCHLORIDE AND ATROPINE SULFATE 2.5; .025 MG/1; MG/1
1 TABLET ORAL 4 TIMES DAILY PRN
Status: DISCONTINUED | OUTPATIENT
Start: 2020-01-01 | End: 2020-01-01 | Stop reason: HOSPADM

## 2020-01-01 RX ORDER — FUROSEMIDE 20 MG/1
20 TABLET ORAL DAILY PRN
COMMUNITY

## 2020-01-01 RX ORDER — LOSARTAN POTASSIUM 50 MG/1
50 TABLET ORAL
Status: DISCONTINUED | OUTPATIENT
Start: 2020-01-01 | End: 2020-01-01 | Stop reason: HOSPADM

## 2020-01-01 RX ORDER — ONDANSETRON 4 MG/1
4 TABLET, FILM COATED ORAL EVERY 6 HOURS PRN
Status: DISCONTINUED | OUTPATIENT
Start: 2020-01-01 | End: 2020-10-23 | Stop reason: HOSPADM

## 2020-01-01 RX ORDER — LEVOTHYROXINE SODIUM 112 UG/1
112 TABLET ORAL DAILY
COMMUNITY

## 2020-01-01 RX ORDER — OLANZAPINE 10 MG/1
5 INJECTION, POWDER, LYOPHILIZED, FOR SOLUTION INTRAMUSCULAR ONCE
Status: DISCONTINUED | OUTPATIENT
Start: 2020-01-01 | End: 2020-01-01 | Stop reason: HOSPADM

## 2020-01-01 RX ORDER — ONDANSETRON 2 MG/ML
4 INJECTION INTRAMUSCULAR; INTRAVENOUS EVERY 6 HOURS PRN
Status: DISCONTINUED | OUTPATIENT
Start: 2020-01-01 | End: 2020-01-01 | Stop reason: HOSPADM

## 2020-01-01 RX ORDER — ONDANSETRON 2 MG/ML
4 INJECTION INTRAMUSCULAR; INTRAVENOUS EVERY 6 HOURS PRN
Status: DISCONTINUED | OUTPATIENT
Start: 2020-01-01 | End: 2020-10-23 | Stop reason: HOSPADM

## 2020-01-01 RX ORDER — HALOPERIDOL 5 MG/ML
2 INJECTION INTRAMUSCULAR
Status: DISCONTINUED | OUTPATIENT
Start: 2020-01-01 | End: 2020-01-01 | Stop reason: HOSPADM

## 2020-01-01 RX ORDER — TRAZODONE HYDROCHLORIDE 100 MG/1
100 TABLET ORAL NIGHTLY
Status: DISCONTINUED | OUTPATIENT
Start: 2020-01-01 | End: 2020-01-01 | Stop reason: HOSPADM

## 2020-01-01 RX ORDER — SODIUM HYPOCHLORITE 1.25 MG/ML
SOLUTION TOPICAL 2 TIMES DAILY
Status: DISCONTINUED | OUTPATIENT
Start: 2020-01-01 | End: 2020-10-23 | Stop reason: HOSPADM

## 2020-01-01 RX ORDER — HYDRALAZINE HYDROCHLORIDE 20 MG/ML
5 INJECTION INTRAMUSCULAR; INTRAVENOUS ONCE AS NEEDED
Status: COMPLETED | OUTPATIENT
Start: 2020-01-01 | End: 2020-01-01

## 2020-01-01 RX ORDER — ALPRAZOLAM 0.5 MG/1
0.5 TABLET ORAL EVERY 6 HOURS PRN
Status: DISCONTINUED | OUTPATIENT
Start: 2020-01-01 | End: 2020-01-01 | Stop reason: HOSPADM

## 2020-01-01 RX ORDER — SENNA PLUS 8.6 MG/1
2 TABLET ORAL 2 TIMES DAILY
Status: DISCONTINUED | OUTPATIENT
Start: 2020-01-01 | End: 2020-10-23 | Stop reason: HOSPADM

## 2020-01-01 RX ORDER — ASPIRIN 81 MG/1
81 TABLET, CHEWABLE ORAL DAILY
Status: DISCONTINUED | OUTPATIENT
Start: 2020-01-01 | End: 2020-01-01 | Stop reason: HOSPADM

## 2020-01-01 RX ORDER — OLANZAPINE 10 MG/1
10 TABLET ORAL
Qty: 30 TABLET | Refills: 0 | Status: SHIPPED | OUTPATIENT
Start: 2020-01-01 | End: 2020-01-01

## 2020-01-01 RX ORDER — OLANZAPINE 10 MG/1
10 TABLET ORAL
Status: ON HOLD | COMMUNITY
End: 2020-01-01

## 2020-01-01 RX ORDER — NICOTINE POLACRILEX 4 MG
15 LOZENGE BUCCAL
Status: DISCONTINUED | OUTPATIENT
Start: 2020-01-01 | End: 2020-10-23 | Stop reason: HOSPADM

## 2020-01-01 RX ORDER — DEXTROSE MONOHYDRATE 25 G/50ML
25 INJECTION, SOLUTION INTRAVENOUS
Status: DISCONTINUED | OUTPATIENT
Start: 2020-01-01 | End: 2020-10-23 | Stop reason: HOSPADM

## 2020-01-01 RX ORDER — LEVOTHYROXINE SODIUM 20 UG/ML
75 INJECTION, SOLUTION INTRAVENOUS
Status: DISCONTINUED | OUTPATIENT
Start: 2020-01-01 | End: 2020-10-23 | Stop reason: HOSPADM

## 2020-01-01 RX ORDER — DILTIAZEM HYDROCHLORIDE 240 MG/1
240 CAPSULE, COATED, EXTENDED RELEASE ORAL
Qty: 30 CAPSULE | Refills: 0 | Status: SHIPPED | OUTPATIENT
Start: 2020-01-01

## 2020-01-01 RX ORDER — NOREPINEPHRINE BIT/0.9 % NACL 8 MG/250ML
.02-.3 INFUSION BOTTLE (ML) INTRAVENOUS
Status: CANCELLED | OUTPATIENT
Start: 2020-01-01

## 2020-01-01 RX ORDER — POTASSIUM CHLORIDE 750 MG/1
20 CAPSULE, EXTENDED RELEASE ORAL DAILY
Qty: 60 CAPSULE | Refills: 0 | Status: SHIPPED | OUTPATIENT
Start: 2020-01-01

## 2020-01-01 RX ORDER — ACETAMINOPHEN AND CODEINE PHOSPHATE 300; 30 MG/1; MG/1
1 TABLET ORAL EVERY 6 HOURS PRN
Status: DISCONTINUED | OUTPATIENT
Start: 2020-01-01 | End: 2020-01-01

## 2020-01-01 RX ORDER — POLYETHYLENE GLYCOL 3350 17 G/17G
17 POWDER, FOR SOLUTION ORAL DAILY
Status: DISCONTINUED | OUTPATIENT
Start: 2020-01-01 | End: 2020-10-23 | Stop reason: HOSPADM

## 2020-01-01 RX ORDER — ACETAMINOPHEN AND CODEINE PHOSPHATE 300; 30 MG/1; MG/1
2 TABLET ORAL EVERY 4 HOURS PRN
Status: DISCONTINUED | OUTPATIENT
Start: 2020-01-01 | End: 2020-01-01 | Stop reason: HOSPADM

## 2020-01-01 RX ORDER — NALOXEGOL OXALATE 25 MG/1
TABLET, FILM COATED ORAL
Qty: 30 TABLET | Refills: 5 | Status: SHIPPED | OUTPATIENT
Start: 2020-01-01 | End: 2020-01-01

## 2020-01-01 RX ORDER — HYDROCODONE BITARTRATE AND ACETAMINOPHEN 5; 325 MG/1; MG/1
1 TABLET ORAL ONCE
Status: COMPLETED | OUTPATIENT
Start: 2020-01-01 | End: 2020-01-01

## 2020-01-01 RX ORDER — PREDNISONE 1 MG/1
5 TABLET ORAL DAILY
Status: ON HOLD | COMMUNITY
End: 2020-01-01 | Stop reason: SDUPTHER

## 2020-01-01 RX ORDER — PHENOBARBITAL, HYOSCYAMINE SULFATE, ATROPINE SULFATE AND SCOPOLAMINE HYDROBROMIDE .0194; .1037; 16.2; .0065 MG/1; MG/1; MG/1; MG/1
1 TABLET ORAL EVERY 8 HOURS PRN
Status: DISCONTINUED | OUTPATIENT
Start: 2020-01-01 | End: 2020-01-01 | Stop reason: HOSPADM

## 2020-01-01 RX ORDER — ROPIVACAINE HYDROCHLORIDE 5 MG/ML
30 INJECTION, SOLUTION EPIDURAL; INFILTRATION; PERINEURAL ONCE
Status: COMPLETED | OUTPATIENT
Start: 2020-01-01 | End: 2020-01-01

## 2020-01-01 RX ORDER — ACETAMINOPHEN AND CODEINE PHOSPHATE 300; 30 MG/1; MG/1
2 TABLET ORAL EVERY 4 HOURS PRN
Status: DISPENSED | OUTPATIENT
Start: 2020-01-01 | End: 2020-01-01

## 2020-01-01 RX ORDER — ESCITALOPRAM OXALATE 10 MG/1
10 TABLET ORAL DAILY
COMMUNITY

## 2020-01-01 RX ORDER — SODIUM CHLORIDE 9 MG/ML
75 INJECTION, SOLUTION INTRAVENOUS ONCE
Status: COMPLETED | OUTPATIENT
Start: 2020-01-01 | End: 2020-01-01

## 2020-01-01 RX ORDER — OLANZAPINE 2.5 MG/1
2.5 TABLET ORAL DAILY
Status: DISCONTINUED | OUTPATIENT
Start: 2020-01-01 | End: 2020-01-01

## 2020-01-01 RX ORDER — MIDAZOLAM HYDROCHLORIDE 1 MG/ML
1 INJECTION INTRAMUSCULAR; INTRAVENOUS AS NEEDED
Status: DISCONTINUED | OUTPATIENT
Start: 2020-01-01 | End: 2020-01-01

## 2020-01-01 RX ORDER — OLANZAPINE 10 MG/1
10 INJECTION, POWDER, LYOPHILIZED, FOR SOLUTION INTRAMUSCULAR EVERY EVENING
Status: DISCONTINUED | OUTPATIENT
Start: 2020-01-01 | End: 2020-01-01

## 2020-01-01 RX ORDER — LORAZEPAM 2 MG/ML
1 INJECTION INTRAMUSCULAR EVERY 6 HOURS
Status: DISCONTINUED | OUTPATIENT
Start: 2020-01-01 | End: 2020-01-01

## 2020-01-01 RX ADMIN — LEVOTHYROXINE SODIUM 112 MCG: 112 TABLET ORAL at 05:53

## 2020-01-01 RX ADMIN — FUROSEMIDE 20 MG: 20 TABLET ORAL at 09:06

## 2020-01-01 RX ADMIN — OLANZAPINE 7.5 MG: 7.5 TABLET ORAL at 16:36

## 2020-01-01 RX ADMIN — ACETAMINOPHEN AND CODEINE PHOSPHATE 2 TABLET: 300; 30 TABLET ORAL at 01:34

## 2020-01-01 RX ADMIN — IPRATROPIUM BROMIDE AND ALBUTEROL SULFATE 3 ML: 2.5; .5 SOLUTION RESPIRATORY (INHALATION) at 10:34

## 2020-01-01 RX ADMIN — OLANZAPINE 10 MG: 10 TABLET, FILM COATED ORAL at 16:38

## 2020-01-01 RX ADMIN — MULTIPLE VITAMINS W/ MINERALS TAB 1 TABLET: TAB at 12:00

## 2020-01-01 RX ADMIN — BUDESONIDE AND FORMOTEROL FUMARATE DIHYDRATE 2 PUFF: 160; 4.5 AEROSOL RESPIRATORY (INHALATION) at 07:28

## 2020-01-01 RX ADMIN — ALPRAZOLAM 1 MG: 1 TABLET ORAL at 19:45

## 2020-01-01 RX ADMIN — LORAZEPAM 1 MG: 1 TABLET ORAL at 08:40

## 2020-01-01 RX ADMIN — SENNOSIDES 2 TABLET: 8.6 TABLET, FILM COATED ORAL at 09:05

## 2020-01-01 RX ADMIN — IPRATROPIUM BROMIDE AND ALBUTEROL SULFATE 3 ML: 2.5; .5 SOLUTION RESPIRATORY (INHALATION) at 15:13

## 2020-01-01 RX ADMIN — ACETAMINOPHEN AND CODEINE PHOSPHATE 2 TABLET: 300; 30 TABLET ORAL at 22:01

## 2020-01-01 RX ADMIN — AMOXICILLIN AND CLAVULANATE POTASSIUM 500 MG: 500; 125 TABLET, FILM COATED ORAL at 20:16

## 2020-01-01 RX ADMIN — TAZOBACTAM SODIUM AND PIPERACILLIN SODIUM 3.38 G: 375; 3 INJECTION, SOLUTION INTRAVENOUS at 13:53

## 2020-01-01 RX ADMIN — ACETAMINOPHEN AND CODEINE PHOSPHATE 1 TABLET: 300; 30 TABLET ORAL at 12:50

## 2020-01-01 RX ADMIN — PANTOPRAZOLE SODIUM 40 MG: 40 TABLET, DELAYED RELEASE ORAL at 05:50

## 2020-01-01 RX ADMIN — ACETAMINOPHEN AND CODEINE PHOSPHATE 2 TABLET: 300; 30 TABLET ORAL at 13:48

## 2020-01-01 RX ADMIN — FUROSEMIDE 40 MG: 10 INJECTION, SOLUTION INTRAMUSCULAR; INTRAVENOUS at 14:33

## 2020-01-01 RX ADMIN — ROPIVACAINE HYDROCHLORIDE 21 ML: 5 INJECTION, SOLUTION EPIDURAL; INFILTRATION; PERINEURAL at 15:45

## 2020-01-01 RX ADMIN — IPRATROPIUM BROMIDE AND ALBUTEROL SULFATE 3 ML: 2.5; .5 SOLUTION RESPIRATORY (INHALATION) at 15:23

## 2020-01-01 RX ADMIN — MULTIPLE VITAMINS W/ MINERALS TAB 1 TABLET: TAB at 08:54

## 2020-01-01 RX ADMIN — HYDROCORTISONE SODIUM SUCCINATE 50 MG: 100 INJECTION, POWDER, FOR SOLUTION INTRAMUSCULAR; INTRAVENOUS at 20:17

## 2020-01-01 RX ADMIN — LORAZEPAM 1 MG: 1 TABLET ORAL at 21:00

## 2020-01-01 RX ADMIN — DICLOFENAC SODIUM 4 G: 10 GEL TOPICAL at 09:30

## 2020-01-01 RX ADMIN — ACETAMINOPHEN AND CODEINE PHOSPHATE 2 TABLET: 300; 30 TABLET ORAL at 01:39

## 2020-01-01 RX ADMIN — FUROSEMIDE 20 MG: 20 TABLET ORAL at 08:00

## 2020-01-01 RX ADMIN — DILTIAZEM HYDROCHLORIDE 240 MG: 240 CAPSULE, COATED, EXTENDED RELEASE ORAL at 08:34

## 2020-01-01 RX ADMIN — DICLOFENAC SODIUM 4 G: 10 GEL TOPICAL at 21:15

## 2020-01-01 RX ADMIN — IPRATROPIUM BROMIDE AND ALBUTEROL SULFATE 3 ML: 2.5; .5 SOLUTION RESPIRATORY (INHALATION) at 06:56

## 2020-01-01 RX ADMIN — POLYETHYLENE GLYCOL 3350 17 G: 17 POWDER, FOR SOLUTION ORAL at 08:07

## 2020-01-01 RX ADMIN — IPRATROPIUM BROMIDE AND ALBUTEROL SULFATE 3 ML: 2.5; .5 SOLUTION RESPIRATORY (INHALATION) at 11:11

## 2020-01-01 RX ADMIN — SODIUM CHLORIDE, PRESERVATIVE FREE 3 ML: 5 INJECTION INTRAVENOUS at 08:47

## 2020-01-01 RX ADMIN — METOPROLOL SUCCINATE 25 MG: 25 TABLET, EXTENDED RELEASE ORAL at 16:54

## 2020-01-01 RX ADMIN — AMLODIPINE BESYLATE 5 MG: 5 TABLET ORAL at 08:41

## 2020-01-01 RX ADMIN — HYDROCORTISONE SODIUM SUCCINATE 100 MG: 250 INJECTION, POWDER, FOR SOLUTION INTRAMUSCULAR; INTRAVENOUS at 17:29

## 2020-01-01 RX ADMIN — LEVOTHYROXINE SODIUM 112 MCG: 112 TABLET ORAL at 08:33

## 2020-01-01 RX ADMIN — Medication 1000 MG: at 08:54

## 2020-01-01 RX ADMIN — MULTIPLE VITAMINS W/ MINERALS TAB 1 TABLET: TAB at 08:37

## 2020-01-01 RX ADMIN — DEXAMETHASONE 6 MG: 4 TABLET ORAL at 08:58

## 2020-01-01 RX ADMIN — MULTIPLE VITAMINS W/ MINERALS TAB 1 TABLET: TAB at 09:28

## 2020-01-01 RX ADMIN — LOSARTAN POTASSIUM 50 MG: 50 TABLET, FILM COATED ORAL at 08:53

## 2020-01-01 RX ADMIN — LOSARTAN POTASSIUM 100 MG: 100 TABLET, FILM COATED ORAL at 08:52

## 2020-01-01 RX ADMIN — SODIUM CHLORIDE, PRESERVATIVE FREE 10 ML: 5 INJECTION INTRAVENOUS at 08:54

## 2020-01-01 RX ADMIN — BUDESONIDE AND FORMOTEROL FUMARATE DIHYDRATE 2 PUFF: 160; 4.5 AEROSOL RESPIRATORY (INHALATION) at 08:39

## 2020-01-01 RX ADMIN — MORPHINE SULFATE 1 MG: 2 INJECTION, SOLUTION INTRAMUSCULAR; INTRAVENOUS at 19:59

## 2020-01-01 RX ADMIN — PREDNISONE 5 MG: 5 TABLET ORAL at 08:54

## 2020-01-01 RX ADMIN — PREDNISONE 5 MG: 5 TABLET ORAL at 08:37

## 2020-01-01 RX ADMIN — ACETAMINOPHEN AND CODEINE PHOSPHATE 2 TABLET: 300; 30 TABLET ORAL at 08:32

## 2020-01-01 RX ADMIN — SENNOSIDES 2 TABLET: 8.6 TABLET, FILM COATED ORAL at 20:21

## 2020-01-01 RX ADMIN — DICLOFENAC SODIUM 4 G: 10 GEL TOPICAL at 20:54

## 2020-01-01 RX ADMIN — TAZOBACTAM SODIUM AND PIPERACILLIN SODIUM 3.38 G: 375; 3 INJECTION, SOLUTION INTRAVENOUS at 13:39

## 2020-01-01 RX ADMIN — LEVOTHYROXINE SODIUM 75 MCG: 20 INJECTION, SOLUTION INTRAVENOUS at 10:17

## 2020-01-01 RX ADMIN — DICLOFENAC SODIUM 4 G: 10 GEL TOPICAL at 20:02

## 2020-01-01 RX ADMIN — DICLOFENAC SODIUM 4 G: 10 GEL TOPICAL at 18:39

## 2020-01-01 RX ADMIN — METOPROLOL SUCCINATE 25 MG: 25 TABLET, EXTENDED RELEASE ORAL at 21:46

## 2020-01-01 RX ADMIN — ENOXAPARIN SODIUM 40 MG: 40 INJECTION SUBCUTANEOUS at 14:19

## 2020-01-01 RX ADMIN — MULTIPLE VITAMINS W/ MINERALS TAB 1 TABLET: TAB at 09:56

## 2020-01-01 RX ADMIN — LOSARTAN POTASSIUM 50 MG: 50 TABLET, FILM COATED ORAL at 16:54

## 2020-01-01 RX ADMIN — SENNOSIDES 2 TABLET: 8.6 TABLET, FILM COATED ORAL at 08:54

## 2020-01-01 RX ADMIN — POTASSIUM CHLORIDE 20 MEQ: 10 CAPSULE, COATED, EXTENDED RELEASE ORAL at 08:33

## 2020-01-01 RX ADMIN — OLANZAPINE 5 MG: 5 TABLET ORAL at 17:23

## 2020-01-01 RX ADMIN — IPRATROPIUM BROMIDE AND ALBUTEROL SULFATE 3 ML: 2.5; .5 SOLUTION RESPIRATORY (INHALATION) at 15:37

## 2020-01-01 RX ADMIN — VANCOMYCIN HYDROCHLORIDE 1000 MG: 1 INJECTION, SOLUTION INTRAVENOUS at 10:59

## 2020-01-01 RX ADMIN — SENNOSIDES 2 TABLET: 8.6 TABLET, FILM COATED ORAL at 20:47

## 2020-01-01 RX ADMIN — ALPRAZOLAM 1 MG: 1 TABLET ORAL at 09:22

## 2020-01-01 RX ADMIN — AMOXICILLIN AND CLAVULANATE POTASSIUM 500 MG: 500; 125 TABLET, FILM COATED ORAL at 08:34

## 2020-01-01 RX ADMIN — MORPHINE SULFATE 1 MG: 2 INJECTION, SOLUTION INTRAMUSCULAR; INTRAVENOUS at 09:29

## 2020-01-01 RX ADMIN — SODIUM CHLORIDE, PRESERVATIVE FREE 10 ML: 5 INJECTION INTRAVENOUS at 21:07

## 2020-01-01 RX ADMIN — SODIUM CHLORIDE, PRESERVATIVE FREE 10 ML: 5 INJECTION INTRAVENOUS at 20:54

## 2020-01-01 RX ADMIN — ACETAMINOPHEN 650 MG: 325 TABLET, FILM COATED ORAL at 05:59

## 2020-01-01 RX ADMIN — LOSARTAN POTASSIUM 50 MG: 50 TABLET, FILM COATED ORAL at 07:56

## 2020-01-01 RX ADMIN — IPRATROPIUM BROMIDE AND ALBUTEROL SULFATE 3 ML: 2.5; .5 SOLUTION RESPIRATORY (INHALATION) at 12:09

## 2020-01-01 RX ADMIN — ACETAMINOPHEN AND CODEINE PHOSPHATE 2 TABLET: 300; 30 TABLET ORAL at 12:00

## 2020-01-01 RX ADMIN — MORPHINE SULFATE 1 MG: 2 INJECTION, SOLUTION INTRAMUSCULAR; INTRAVENOUS at 13:04

## 2020-01-01 RX ADMIN — LEFLUNOMIDE 20 MG: 20 TABLET ORAL at 08:41

## 2020-01-01 RX ADMIN — DICLOFENAC SODIUM 4 G: 10 GEL TOPICAL at 18:40

## 2020-01-01 RX ADMIN — CEFEPIME HYDROCHLORIDE 2 G: 2 INJECTION, POWDER, FOR SOLUTION INTRAVENOUS at 23:26

## 2020-01-01 RX ADMIN — MULTIPLE VITAMINS W/ MINERALS TAB 1 TABLET: TAB at 13:24

## 2020-01-01 RX ADMIN — PANTOPRAZOLE SODIUM 40 MG: 40 TABLET, DELAYED RELEASE ORAL at 09:00

## 2020-01-01 RX ADMIN — PREDNISONE 5 MG: 5 TABLET ORAL at 07:39

## 2020-01-01 RX ADMIN — FUROSEMIDE 40 MG: 10 INJECTION, SOLUTION INTRAMUSCULAR; INTRAVENOUS at 09:05

## 2020-01-01 RX ADMIN — LEVOTHYROXINE SODIUM 112 MCG: 112 TABLET ORAL at 09:12

## 2020-01-01 RX ADMIN — ACETAMINOPHEN 650 MG: 650 SUPPOSITORY RECTAL at 23:31

## 2020-01-01 RX ADMIN — MULTIPLE VITAMINS W/ MINERALS TAB 1 TABLET: TAB at 08:33

## 2020-01-01 RX ADMIN — TRAZODONE HYDROCHLORIDE 100 MG: 100 TABLET ORAL at 20:16

## 2020-01-01 RX ADMIN — FUROSEMIDE 20 MG: 20 TABLET ORAL at 08:42

## 2020-01-01 RX ADMIN — AMLODIPINE BESYLATE 5 MG: 5 TABLET ORAL at 09:29

## 2020-01-01 RX ADMIN — LORAZEPAM 1 MG: 2 INJECTION INTRAMUSCULAR; INTRAVENOUS at 13:56

## 2020-01-01 RX ADMIN — PREDNISONE 20 MG: 20 TABLET ORAL at 08:33

## 2020-01-01 RX ADMIN — PREDNISONE 10 MG: 10 TABLET ORAL at 08:52

## 2020-01-01 RX ADMIN — POTASSIUM CHLORIDE 40 MEQ: 750 CAPSULE, EXTENDED RELEASE ORAL at 06:40

## 2020-01-01 RX ADMIN — ALPRAZOLAM 0.5 MG: 0.5 TABLET ORAL at 03:42

## 2020-01-01 RX ADMIN — LEFLUNOMIDE 20 MG: 20 TABLET ORAL at 08:53

## 2020-01-01 RX ADMIN — HYDROCORTISONE SODIUM SUCCINATE 50 MG: 100 INJECTION, POWDER, FOR SOLUTION INTRAMUSCULAR; INTRAVENOUS at 09:13

## 2020-01-01 RX ADMIN — IPRATROPIUM BROMIDE AND ALBUTEROL SULFATE 3 ML: 2.5; .5 SOLUTION RESPIRATORY (INHALATION) at 06:54

## 2020-01-01 RX ADMIN — MULTIPLE VITAMINS W/ MINERALS TAB 1 TABLET: TAB at 12:27

## 2020-01-01 RX ADMIN — AMOXICILLIN AND CLAVULANATE POTASSIUM 500 MG: 500; 125 TABLET, FILM COATED ORAL at 21:10

## 2020-01-01 RX ADMIN — MULTIPLE VITAMINS W/ MINERALS TAB 1 TABLET: TAB at 09:12

## 2020-01-01 RX ADMIN — METOPROLOL SUCCINATE 25 MG: 25 TABLET, EXTENDED RELEASE ORAL at 18:21

## 2020-01-01 RX ADMIN — PREDNISONE 5 MG: 5 TABLET ORAL at 10:22

## 2020-01-01 RX ADMIN — PANTOPRAZOLE SODIUM 40 MG: 40 TABLET, DELAYED RELEASE ORAL at 08:54

## 2020-01-01 RX ADMIN — SODIUM CHLORIDE 75 ML/HR: 9 INJECTION, SOLUTION INTRAVENOUS at 18:22

## 2020-01-01 RX ADMIN — LORAZEPAM 1 MG: 1 TABLET ORAL at 21:14

## 2020-01-01 RX ADMIN — SENNOSIDES 2 TABLET: 8.6 TABLET, FILM COATED ORAL at 21:09

## 2020-01-01 RX ADMIN — LOSARTAN POTASSIUM 50 MG: 50 TABLET, FILM COATED ORAL at 10:22

## 2020-01-01 RX ADMIN — SODIUM CHLORIDE, PRESERVATIVE FREE 10 ML: 5 INJECTION INTRAVENOUS at 21:38

## 2020-01-01 RX ADMIN — METOPROLOL SUCCINATE 25 MG: 25 TABLET, EXTENDED RELEASE ORAL at 21:09

## 2020-01-01 RX ADMIN — TRAZODONE HYDROCHLORIDE 100 MG: 100 TABLET ORAL at 20:31

## 2020-01-01 RX ADMIN — ALBUTEROL SULFATE 2.5 MG: 2.5 SOLUTION RESPIRATORY (INHALATION) at 07:19

## 2020-01-01 RX ADMIN — PREDNISONE 5 MG: 5 TABLET ORAL at 09:29

## 2020-01-01 RX ADMIN — TIOTROPIUM BROMIDE INHALATION SPRAY 2 PUFF: 3.12 SPRAY, METERED RESPIRATORY (INHALATION) at 07:26

## 2020-01-01 RX ADMIN — OLANZAPINE 2.5 MG: 2.5 TABLET ORAL at 10:19

## 2020-01-01 RX ADMIN — LOSARTAN POTASSIUM 100 MG: 100 TABLET, FILM COATED ORAL at 08:33

## 2020-01-01 RX ADMIN — ENOXAPARIN SODIUM 40 MG: 40 INJECTION SUBCUTANEOUS at 16:36

## 2020-01-01 RX ADMIN — ALPRAZOLAM 1 MG: 1 TABLET ORAL at 09:20

## 2020-01-01 RX ADMIN — LORAZEPAM 1 MG: 2 INJECTION INTRAMUSCULAR; INTRAVENOUS at 21:38

## 2020-01-01 RX ADMIN — LORAZEPAM 1 MG: 2 INJECTION INTRAMUSCULAR; INTRAVENOUS at 13:30

## 2020-01-01 RX ADMIN — ACETAMINOPHEN AND CODEINE PHOSPHATE 2 TABLET: 300; 30 TABLET ORAL at 03:41

## 2020-01-01 RX ADMIN — VITAMIN D, TAB 1000IU (100/BT) 1000 UNITS: 25 TAB at 08:54

## 2020-01-01 RX ADMIN — DILTIAZEM HYDROCHLORIDE 240 MG: 240 CAPSULE, COATED, EXTENDED RELEASE ORAL at 09:21

## 2020-01-01 RX ADMIN — PREDNISONE 5 MG: 5 TABLET ORAL at 09:57

## 2020-01-01 RX ADMIN — MORPHINE SULFATE 1 MG: 2 INJECTION, SOLUTION INTRAMUSCULAR; INTRAVENOUS at 02:04

## 2020-01-01 RX ADMIN — LEVOTHYROXINE SODIUM 112 MCG: 112 TABLET ORAL at 05:50

## 2020-01-01 RX ADMIN — AMLODIPINE BESYLATE 5 MG: 5 TABLET ORAL at 20:15

## 2020-01-01 RX ADMIN — AMLODIPINE BESYLATE 5 MG: 5 TABLET ORAL at 07:55

## 2020-01-01 RX ADMIN — DICLOFENAC SODIUM 4 G: 10 GEL TOPICAL at 20:24

## 2020-01-01 RX ADMIN — OLANZAPINE 10 MG: 10 TABLET, FILM COATED ORAL at 16:32

## 2020-01-01 RX ADMIN — ALPRAZOLAM 1 MG: 1 TABLET ORAL at 16:38

## 2020-01-01 RX ADMIN — LOSARTAN POTASSIUM 50 MG: 50 TABLET, FILM COATED ORAL at 20:16

## 2020-01-01 RX ADMIN — DILTIAZEM HYDROCHLORIDE 240 MG: 240 CAPSULE, COATED, EXTENDED RELEASE ORAL at 09:29

## 2020-01-01 RX ADMIN — POLYETHYLENE GLYCOL 3350 17 G: 17 POWDER, FOR SOLUTION ORAL at 08:34

## 2020-01-01 RX ADMIN — FENTANYL CITRATE 25 MCG: 50 INJECTION, SOLUTION INTRAMUSCULAR; INTRAVENOUS at 15:32

## 2020-01-01 RX ADMIN — TRAZODONE HYDROCHLORIDE 100 MG: 100 TABLET ORAL at 21:04

## 2020-01-01 RX ADMIN — MULTIPLE VITAMINS W/ MINERALS TAB 1 TABLET: TAB at 08:07

## 2020-01-01 RX ADMIN — IPRATROPIUM BROMIDE AND ALBUTEROL SULFATE 3 ML: 2.5; .5 SOLUTION RESPIRATORY (INHALATION) at 15:15

## 2020-01-01 RX ADMIN — DOXYCYCLINE 100 MG: 100 CAPSULE ORAL at 08:34

## 2020-01-01 RX ADMIN — FUROSEMIDE 20 MG: 20 TABLET ORAL at 08:07

## 2020-01-01 RX ADMIN — DILTIAZEM HYDROCHLORIDE 240 MG: 240 CAPSULE, COATED, EXTENDED RELEASE ORAL at 08:29

## 2020-01-01 RX ADMIN — LEVOTHYROXINE SODIUM 112 MCG: 112 TABLET ORAL at 07:56

## 2020-01-01 RX ADMIN — LORAZEPAM 0.25 MG: 2 INJECTION INTRAMUSCULAR; INTRAVENOUS at 12:01

## 2020-01-01 RX ADMIN — PANTOPRAZOLE SODIUM 40 MG: 40 TABLET, DELAYED RELEASE ORAL at 08:40

## 2020-01-01 RX ADMIN — TIOTROPIUM BROMIDE INHALATION SPRAY 2 PUFF: 3.12 SPRAY, METERED RESPIRATORY (INHALATION) at 08:39

## 2020-01-01 RX ADMIN — IPRATROPIUM BROMIDE AND ALBUTEROL SULFATE 3 ML: 2.5; .5 SOLUTION RESPIRATORY (INHALATION) at 12:22

## 2020-01-01 RX ADMIN — LEFLUNOMIDE 20 MG: 20 TABLET ORAL at 09:27

## 2020-01-01 RX ADMIN — ALPRAZOLAM 0.5 MG: 0.5 TABLET ORAL at 06:33

## 2020-01-01 RX ADMIN — IPRATROPIUM BROMIDE AND ALBUTEROL SULFATE 3 ML: 2.5; .5 SOLUTION RESPIRATORY (INHALATION) at 14:55

## 2020-01-01 RX ADMIN — AZITHROMYCIN MONOHYDRATE 500 MG: 500 INJECTION, POWDER, LYOPHILIZED, FOR SOLUTION INTRAVENOUS at 14:33

## 2020-01-01 RX ADMIN — HYDRALAZINE HYDROCHLORIDE 5 MG: 20 INJECTION INTRAMUSCULAR; INTRAVENOUS at 23:18

## 2020-01-01 RX ADMIN — OLANZAPINE 10 MG: 10 TABLET, FILM COATED ORAL at 16:42

## 2020-01-01 RX ADMIN — SENNOSIDES 2 TABLET: 8.6 TABLET, FILM COATED ORAL at 20:53

## 2020-01-01 RX ADMIN — IPRATROPIUM BROMIDE AND ALBUTEROL SULFATE 3 ML: 2.5; .5 SOLUTION RESPIRATORY (INHALATION) at 08:22

## 2020-01-01 RX ADMIN — LEFLUNOMIDE 20 MG: 20 TABLET ORAL at 08:37

## 2020-01-01 RX ADMIN — SENNOSIDES 2 TABLET: 8.6 TABLET, FILM COATED ORAL at 20:17

## 2020-01-01 RX ADMIN — TRAZODONE HYDROCHLORIDE 100 MG: 100 TABLET ORAL at 20:53

## 2020-01-01 RX ADMIN — ALPRAZOLAM 1 MG: 1 TABLET ORAL at 22:05

## 2020-01-01 RX ADMIN — FUROSEMIDE 20 MG: 20 TABLET ORAL at 08:29

## 2020-01-01 RX ADMIN — POTASSIUM CHLORIDE 40 MEQ: 750 CAPSULE, EXTENDED RELEASE ORAL at 14:54

## 2020-01-01 RX ADMIN — ACETAMINOPHEN AND CODEINE PHOSPHATE 1 TABLET: 300; 30 TABLET ORAL at 02:49

## 2020-01-01 RX ADMIN — LORAZEPAM 1 MG: 1 TABLET ORAL at 08:47

## 2020-01-01 RX ADMIN — AMLODIPINE BESYLATE 5 MG: 5 TABLET ORAL at 08:33

## 2020-01-01 RX ADMIN — ALPRAZOLAM 0.5 MG: 0.5 TABLET ORAL at 03:02

## 2020-01-01 RX ADMIN — ACETAMINOPHEN AND CODEINE PHOSPHATE 2 TABLET: 300; 30 TABLET ORAL at 16:32

## 2020-01-01 RX ADMIN — ACETAMINOPHEN AND CODEINE PHOSPHATE 2 TABLET: 300; 30 TABLET ORAL at 06:55

## 2020-01-01 RX ADMIN — AMLODIPINE BESYLATE 5 MG: 5 TABLET ORAL at 20:21

## 2020-01-01 RX ADMIN — SODIUM CHLORIDE, PRESERVATIVE FREE 10 ML: 5 INJECTION INTRAVENOUS at 09:30

## 2020-01-01 RX ADMIN — AMLODIPINE BESYLATE 5 MG: 5 TABLET ORAL at 21:38

## 2020-01-01 RX ADMIN — AMLODIPINE BESYLATE 5 MG: 5 TABLET ORAL at 08:48

## 2020-01-01 RX ADMIN — ALPRAZOLAM 0.5 MG: 0.5 TABLET ORAL at 21:04

## 2020-01-01 RX ADMIN — ALPRAZOLAM 0.5 MG: 0.5 TABLET ORAL at 22:43

## 2020-01-01 RX ADMIN — TIOTROPIUM BROMIDE INHALATION SPRAY 2 PUFF: 3.12 SPRAY, METERED RESPIRATORY (INHALATION) at 07:53

## 2020-01-01 RX ADMIN — TRAZODONE HYDROCHLORIDE 100 MG: 100 TABLET ORAL at 20:24

## 2020-01-01 RX ADMIN — LEVOTHYROXINE SODIUM 112 MCG: 112 TABLET ORAL at 05:20

## 2020-01-01 RX ADMIN — DICLOFENAC SODIUM 4 G: 10 GEL TOPICAL at 08:49

## 2020-01-01 RX ADMIN — TRAZODONE HYDROCHLORIDE 100 MG: 100 TABLET ORAL at 22:12

## 2020-01-01 RX ADMIN — LORAZEPAM 1 MG: 2 INJECTION INTRAMUSCULAR; INTRAVENOUS at 21:49

## 2020-01-01 RX ADMIN — FUROSEMIDE 20 MG: 20 TABLET ORAL at 09:24

## 2020-01-01 RX ADMIN — PREDNISONE 20 MG: 20 TABLET ORAL at 16:42

## 2020-01-01 RX ADMIN — ALPRAZOLAM 0.5 MG: 0.5 TABLET ORAL at 06:55

## 2020-01-01 RX ADMIN — DICLOFENAC SODIUM 4 G: 10 GEL TOPICAL at 17:59

## 2020-01-01 RX ADMIN — VANCOMYCIN HYDROCHLORIDE 1000 MG: 1 INJECTION, SOLUTION INTRAVENOUS at 09:57

## 2020-01-01 RX ADMIN — POTASSIUM CHLORIDE AND SODIUM CHLORIDE 75 ML/HR: 900; 150 INJECTION, SOLUTION INTRAVENOUS at 11:56

## 2020-01-01 RX ADMIN — PANTOPRAZOLE SODIUM 40 MG: 40 TABLET, DELAYED RELEASE ORAL at 06:38

## 2020-01-01 RX ADMIN — BUDESONIDE AND FORMOTEROL FUMARATE DIHYDRATE 2 PUFF: 160; 4.5 AEROSOL RESPIRATORY (INHALATION) at 21:23

## 2020-01-01 RX ADMIN — DICLOFENAC SODIUM 4 G: 10 GEL TOPICAL at 12:38

## 2020-01-01 RX ADMIN — PANTOPRAZOLE SODIUM 40 MG: 40 TABLET, DELAYED RELEASE ORAL at 06:12

## 2020-01-01 RX ADMIN — ALPRAZOLAM 0.5 MG: 0.5 TABLET ORAL at 22:01

## 2020-01-01 RX ADMIN — ACETAMINOPHEN 1000 MG: 500 TABLET, FILM COATED ORAL at 14:24

## 2020-01-01 RX ADMIN — HYDROCODONE BITARTRATE AND ACETAMINOPHEN 1 TABLET: 5; 325 TABLET ORAL at 12:14

## 2020-01-01 RX ADMIN — LORAZEPAM 1 MG: 2 INJECTION INTRAMUSCULAR; INTRAVENOUS at 12:54

## 2020-01-01 RX ADMIN — POTASSIUM CHLORIDE 40 MEQ: 1.5 POWDER, FOR SOLUTION ORAL at 16:54

## 2020-01-01 RX ADMIN — MORPHINE SULFATE 1 MG: 2 INJECTION, SOLUTION INTRAMUSCULAR; INTRAVENOUS at 13:56

## 2020-01-01 RX ADMIN — SENNOSIDES 2 TABLET: 8.6 TABLET, FILM COATED ORAL at 08:48

## 2020-01-01 RX ADMIN — AMLODIPINE BESYLATE 5 MG: 5 TABLET ORAL at 08:54

## 2020-01-01 RX ADMIN — DICLOFENAC SODIUM 4 G: 10 GEL TOPICAL at 12:00

## 2020-01-01 RX ADMIN — ASPIRIN 81 MG: 81 TABLET, CHEWABLE ORAL at 09:31

## 2020-01-01 RX ADMIN — VANCOMYCIN HYDROCHLORIDE 1000 MG: 1 INJECTION, SOLUTION INTRAVENOUS at 10:24

## 2020-01-01 RX ADMIN — FUROSEMIDE 20 MG: 20 TABLET ORAL at 09:31

## 2020-01-01 RX ADMIN — METOPROLOL SUCCINATE 25 MG: 25 TABLET, EXTENDED RELEASE ORAL at 16:33

## 2020-01-01 RX ADMIN — VITAMIN D, TAB 1000IU (100/BT) 1000 UNITS: 25 TAB at 09:28

## 2020-01-01 RX ADMIN — ALPRAZOLAM 1 MG: 1 TABLET ORAL at 23:15

## 2020-01-01 RX ADMIN — SENNOSIDES 2 TABLET: 8.6 TABLET, FILM COATED ORAL at 09:56

## 2020-01-01 RX ADMIN — Medication 1000 MG: at 20:53

## 2020-01-01 RX ADMIN — HYDRALAZINE HYDROCHLORIDE 10 MG: 20 INJECTION INTRAMUSCULAR; INTRAVENOUS at 14:37

## 2020-01-01 RX ADMIN — TAZOBACTAM SODIUM AND PIPERACILLIN SODIUM 3.38 G: 375; 3 INJECTION, SOLUTION INTRAVENOUS at 21:54

## 2020-01-01 RX ADMIN — POTASSIUM CHLORIDE 40 MEQ: 10 CAPSULE, COATED, EXTENDED RELEASE ORAL at 21:14

## 2020-01-01 RX ADMIN — ACETAMINOPHEN AND CODEINE PHOSPHATE 2 TABLET: 300; 30 TABLET ORAL at 15:19

## 2020-01-01 RX ADMIN — LORAZEPAM 0.25 MG: 2 INJECTION INTRAMUSCULAR; INTRAVENOUS at 14:24

## 2020-01-01 RX ADMIN — ACETAMINOPHEN 1000 MG: 500 TABLET, FILM COATED ORAL at 16:22

## 2020-01-01 RX ADMIN — HALOPERIDOL LACTATE 2 MG: 5 INJECTION, SOLUTION INTRAMUSCULAR at 07:36

## 2020-01-01 RX ADMIN — PREDNISONE 5 MG: 5 TABLET ORAL at 07:55

## 2020-01-01 RX ADMIN — AMOXICILLIN AND CLAVULANATE POTASSIUM 500 MG: 500; 125 TABLET, FILM COATED ORAL at 09:56

## 2020-01-01 RX ADMIN — IPRATROPIUM BROMIDE AND ALBUTEROL SULFATE 3 ML: 2.5; .5 SOLUTION RESPIRATORY (INHALATION) at 15:59

## 2020-01-01 RX ADMIN — IPRATROPIUM BROMIDE AND ALBUTEROL SULFATE 3 ML: 2.5; .5 SOLUTION RESPIRATORY (INHALATION) at 14:40

## 2020-01-01 RX ADMIN — LOSARTAN POTASSIUM 50 MG: 50 TABLET, FILM COATED ORAL at 08:48

## 2020-01-01 RX ADMIN — DICLOFENAC SODIUM 4 G: 10 GEL TOPICAL at 16:34

## 2020-01-01 RX ADMIN — OLANZAPINE 10 MG: 10 TABLET, FILM COATED ORAL at 16:54

## 2020-01-01 RX ADMIN — TRAZODONE HYDROCHLORIDE 50 MG: 50 TABLET ORAL at 20:54

## 2020-01-01 RX ADMIN — ACETAMINOPHEN 1000 MG: 500 TABLET, FILM COATED ORAL at 21:14

## 2020-01-01 RX ADMIN — SODIUM CHLORIDE, PRESERVATIVE FREE 10 ML: 5 INJECTION INTRAVENOUS at 08:14

## 2020-01-01 RX ADMIN — ALPRAZOLAM 0.5 MG: 0.5 TABLET ORAL at 06:12

## 2020-01-01 RX ADMIN — ALBUTEROL SULFATE 2.5 MG: 2.5 SOLUTION RESPIRATORY (INHALATION) at 20:34

## 2020-01-01 RX ADMIN — Medication 1000 MG: at 08:55

## 2020-01-01 RX ADMIN — ACETAMINOPHEN 1000 MG: 500 TABLET, FILM COATED ORAL at 18:21

## 2020-01-01 RX ADMIN — ALPRAZOLAM 0.5 MG: 0.5 TABLET ORAL at 20:53

## 2020-01-01 RX ADMIN — ALPRAZOLAM 0.5 MG: 0.5 TABLET ORAL at 01:30

## 2020-01-01 RX ADMIN — SODIUM CHLORIDE, PRESERVATIVE FREE 3 ML: 5 INJECTION INTRAVENOUS at 08:37

## 2020-01-01 RX ADMIN — PANTOPRAZOLE SODIUM 40 MG: 40 TABLET, DELAYED RELEASE ORAL at 07:25

## 2020-01-01 RX ADMIN — MORPHINE SULFATE 1 MG: 2 INJECTION, SOLUTION INTRAMUSCULAR; INTRAVENOUS at 01:12

## 2020-01-01 RX ADMIN — LOSARTAN POTASSIUM 50 MG: 50 TABLET, FILM COATED ORAL at 09:57

## 2020-01-01 RX ADMIN — SENNOSIDES 2 TABLET: 8.6 TABLET, FILM COATED ORAL at 20:24

## 2020-01-01 RX ADMIN — AMOXICILLIN AND CLAVULANATE POTASSIUM 500 MG: 500; 125 TABLET, FILM COATED ORAL at 08:29

## 2020-01-01 RX ADMIN — ALPRAZOLAM 1 MG: 1 TABLET ORAL at 21:03

## 2020-01-01 RX ADMIN — OLANZAPINE 10 MG: 10 TABLET, FILM COATED ORAL at 17:48

## 2020-01-01 RX ADMIN — VITAMIN D, TAB 1000IU (100/BT) 1000 UNITS: 25 TAB at 08:13

## 2020-01-01 RX ADMIN — DILTIAZEM HYDROCHLORIDE 240 MG: 240 CAPSULE, COATED, EXTENDED RELEASE ORAL at 08:07

## 2020-01-01 RX ADMIN — DICLOFENAC SODIUM 4 G: 10 GEL TOPICAL at 07:38

## 2020-01-01 RX ADMIN — Medication 1000 MG: at 08:33

## 2020-01-01 RX ADMIN — IPRATROPIUM BROMIDE AND ALBUTEROL SULFATE 3 ML: 2.5; .5 SOLUTION RESPIRATORY (INHALATION) at 07:03

## 2020-01-01 RX ADMIN — TRAZODONE HYDROCHLORIDE 100 MG: 100 TABLET ORAL at 20:13

## 2020-01-01 RX ADMIN — MULTIPLE VITAMINS W/ MINERALS TAB 1 TABLET: TAB at 20:15

## 2020-01-01 RX ADMIN — POTASSIUM CHLORIDE 20 MEQ: 10 CAPSULE, COATED, EXTENDED RELEASE ORAL at 08:07

## 2020-01-01 RX ADMIN — DOXYCYCLINE 100 MG: 100 CAPSULE ORAL at 08:29

## 2020-01-01 RX ADMIN — LORAZEPAM 1 MG: 2 INJECTION INTRAMUSCULAR; INTRAVENOUS at 06:16

## 2020-01-01 RX ADMIN — SENNOSIDES 2 TABLET: 8.6 TABLET, FILM COATED ORAL at 09:21

## 2020-01-01 RX ADMIN — LEVOTHYROXINE SODIUM 112 MCG: 112 TABLET ORAL at 06:16

## 2020-01-01 RX ADMIN — DILTIAZEM HYDROCHLORIDE 240 MG: 240 CAPSULE, COATED, EXTENDED RELEASE ORAL at 08:59

## 2020-01-01 RX ADMIN — FUROSEMIDE 20 MG: 20 TABLET ORAL at 08:52

## 2020-01-01 RX ADMIN — SENNOSIDES 2 TABLET: 8.6 TABLET, FILM COATED ORAL at 08:40

## 2020-01-01 RX ADMIN — DILTIAZEM HYDROCHLORIDE 240 MG: 240 CAPSULE, COATED, EXTENDED RELEASE ORAL at 16:34

## 2020-01-01 RX ADMIN — NAPROXEN 375 MG: 250 TABLET ORAL at 00:00

## 2020-01-01 RX ADMIN — SODIUM CHLORIDE 50 ML/HR: 9 INJECTION, SOLUTION INTRAVENOUS at 17:29

## 2020-01-01 RX ADMIN — LORAZEPAM 1 MG: 2 INJECTION INTRAMUSCULAR; INTRAVENOUS at 06:36

## 2020-01-01 RX ADMIN — ROPIVACAINE HYDROCHLORIDE 30 ML: 5 INJECTION, SOLUTION EPIDURAL; INFILTRATION; PERINEURAL at 15:40

## 2020-01-01 RX ADMIN — MULTIPLE VITAMINS W/ MINERALS TAB 1 TABLET: TAB at 07:56

## 2020-01-01 RX ADMIN — ACETAMINOPHEN AND CODEINE PHOSPHATE 2 TABLET: 300; 30 TABLET ORAL at 16:54

## 2020-01-01 RX ADMIN — POTASSIUM CHLORIDE 20 MEQ: 10 CAPSULE, COATED, EXTENDED RELEASE ORAL at 08:34

## 2020-01-01 RX ADMIN — DICLOFENAC SODIUM 4 G: 10 GEL TOPICAL at 08:37

## 2020-01-01 RX ADMIN — TAZOBACTAM SODIUM AND PIPERACILLIN SODIUM 3.38 G: 375; 3 INJECTION, SOLUTION INTRAVENOUS at 20:10

## 2020-01-01 RX ADMIN — ENOXAPARIN SODIUM 30 MG: 30 INJECTION SUBCUTANEOUS at 08:06

## 2020-01-01 RX ADMIN — AMLODIPINE BESYLATE 5 MG: 5 TABLET ORAL at 20:02

## 2020-01-01 RX ADMIN — SENNOSIDES 2 TABLET: 8.6 TABLET, FILM COATED ORAL at 08:59

## 2020-01-01 RX ADMIN — NAPROXEN 375 MG: 250 TABLET ORAL at 11:59

## 2020-01-01 RX ADMIN — SODIUM CHLORIDE, PRESERVATIVE FREE 10 ML: 5 INJECTION INTRAVENOUS at 08:01

## 2020-01-01 RX ADMIN — LOSARTAN POTASSIUM 50 MG: 50 TABLET, FILM COATED ORAL at 09:12

## 2020-01-01 RX ADMIN — METOPROLOL SUCCINATE 25 MG: 25 TABLET, EXTENDED RELEASE ORAL at 16:44

## 2020-01-01 RX ADMIN — HALOPERIDOL LACTATE 2 MG: 5 INJECTION, SOLUTION INTRAMUSCULAR at 23:55

## 2020-01-01 RX ADMIN — PANTOPRAZOLE SODIUM 40 MG: 40 INJECTION, POWDER, FOR SOLUTION INTRAVENOUS at 06:09

## 2020-01-01 RX ADMIN — OLANZAPINE 10 MG: 10 TABLET, FILM COATED ORAL at 18:11

## 2020-01-01 RX ADMIN — LOSARTAN POTASSIUM 100 MG: 100 TABLET, FILM COATED ORAL at 09:29

## 2020-01-01 RX ADMIN — ACETAMINOPHEN 1000 MG: 500 TABLET, FILM COATED ORAL at 05:50

## 2020-01-01 RX ADMIN — Medication 1.5 MCG/KG/MIN: at 04:09

## 2020-01-01 RX ADMIN — DOXYCYCLINE 100 MG: 100 CAPSULE ORAL at 21:10

## 2020-01-01 RX ADMIN — Medication 1000 MG: at 08:13

## 2020-01-01 RX ADMIN — POTASSIUM CHLORIDE 20 MEQ: 10 CAPSULE, COATED, EXTENDED RELEASE ORAL at 09:22

## 2020-01-01 RX ADMIN — PREDNISONE 5 MG: 5 TABLET ORAL at 08:33

## 2020-01-01 RX ADMIN — TRAZODONE HYDROCHLORIDE 100 MG: 100 TABLET ORAL at 19:44

## 2020-01-01 RX ADMIN — ALPRAZOLAM 0.5 MG: 0.5 TABLET ORAL at 17:48

## 2020-01-01 RX ADMIN — LEFLUNOMIDE 20 MG: 20 TABLET ORAL at 08:13

## 2020-01-01 RX ADMIN — PREDNISONE 5 MG: 5 TABLET ORAL at 08:48

## 2020-01-01 RX ADMIN — LOSARTAN POTASSIUM 50 MG: 50 TABLET, FILM COATED ORAL at 08:37

## 2020-01-01 RX ADMIN — SENNOSIDES 2 TABLET: 8.6 TABLET, FILM COATED ORAL at 10:21

## 2020-01-01 RX ADMIN — VITAMIN D, TAB 1000IU (100/BT) 1000 UNITS: 25 TAB at 08:53

## 2020-01-01 RX ADMIN — DOXYCYCLINE 100 MG: 100 CAPSULE ORAL at 09:06

## 2020-01-01 RX ADMIN — LOSARTAN POTASSIUM 100 MG: 100 TABLET, FILM COATED ORAL at 08:07

## 2020-01-01 RX ADMIN — POTASSIUM CHLORIDE 40 MEQ: 10 CAPSULE, COATED, EXTENDED RELEASE ORAL at 10:56

## 2020-01-01 RX ADMIN — PREDNISONE 5 MG: 5 TABLET ORAL at 08:34

## 2020-01-01 RX ADMIN — ACETAMINOPHEN 1000 MG: 500 TABLET, FILM COATED ORAL at 20:15

## 2020-01-01 RX ADMIN — OLANZAPINE 10 MG: 10 TABLET, FILM COATED ORAL at 17:33

## 2020-01-01 RX ADMIN — LOSARTAN POTASSIUM 100 MG: 100 TABLET, FILM COATED ORAL at 08:34

## 2020-01-01 RX ADMIN — SENNOSIDES 2 TABLET: 8.6 TABLET, FILM COATED ORAL at 20:02

## 2020-01-01 RX ADMIN — SENNOSIDES 2 TABLET: 8.6 TABLET, FILM COATED ORAL at 08:33

## 2020-01-01 RX ADMIN — OLANZAPINE 7.5 MG: 7.5 TABLET ORAL at 18:30

## 2020-01-01 RX ADMIN — MORPHINE SULFATE 1 MG: 2 INJECTION, SOLUTION INTRAMUSCULAR; INTRAVENOUS at 01:28

## 2020-01-01 RX ADMIN — METOPROLOL SUCCINATE 25 MG: 25 TABLET, EXTENDED RELEASE ORAL at 20:53

## 2020-01-01 RX ADMIN — DICLOFENAC SODIUM 4 G: 10 GEL TOPICAL at 08:42

## 2020-01-01 RX ADMIN — TRAZODONE HYDROCHLORIDE 100 MG: 100 TABLET ORAL at 20:25

## 2020-01-01 RX ADMIN — DICLOFENAC SODIUM 4 G: 10 GEL TOPICAL at 08:13

## 2020-01-01 RX ADMIN — AMOXICILLIN AND CLAVULANATE POTASSIUM 500 MG: 500; 125 TABLET, FILM COATED ORAL at 08:32

## 2020-01-01 RX ADMIN — VANCOMYCIN HYDROCHLORIDE 1000 MG: 1 INJECTION, SOLUTION INTRAVENOUS at 07:55

## 2020-01-01 RX ADMIN — METOPROLOL SUCCINATE 25 MG: 25 TABLET, EXTENDED RELEASE ORAL at 17:23

## 2020-01-01 RX ADMIN — POTASSIUM CHLORIDE 20 MEQ: 10 CAPSULE, COATED, EXTENDED RELEASE ORAL at 08:52

## 2020-01-01 RX ADMIN — MULTIPLE VITAMINS W/ MINERALS TAB 1 TABLET: TAB at 10:23

## 2020-01-01 RX ADMIN — PANTOPRAZOLE SODIUM 40 MG: 40 TABLET, DELAYED RELEASE ORAL at 06:55

## 2020-01-01 RX ADMIN — PREDNISONE 20 MG: 20 TABLET ORAL at 09:06

## 2020-01-01 RX ADMIN — SODIUM CHLORIDE, PRESERVATIVE FREE 10 ML: 5 INJECTION INTRAVENOUS at 08:48

## 2020-01-01 RX ADMIN — MULTIPLE VITAMINS W/ MINERALS TAB 1 TABLET: TAB at 20:53

## 2020-01-01 RX ADMIN — LOSARTAN POTASSIUM 50 MG: 50 TABLET, FILM COATED ORAL at 08:32

## 2020-01-01 RX ADMIN — POTASSIUM CHLORIDE 20 MEQ: 10 CAPSULE, COATED, EXTENDED RELEASE ORAL at 09:06

## 2020-01-01 RX ADMIN — LEFLUNOMIDE 20 MG: 20 TABLET ORAL at 20:54

## 2020-01-01 RX ADMIN — HALOPERIDOL LACTATE 1 MG: 5 INJECTION, SOLUTION INTRAMUSCULAR at 14:38

## 2020-01-01 RX ADMIN — LORAZEPAM 1 MG: 1 TABLET ORAL at 08:37

## 2020-01-01 RX ADMIN — POTASSIUM CHLORIDE AND SODIUM CHLORIDE 75 ML/HR: 900; 150 INJECTION, SOLUTION INTRAVENOUS at 02:46

## 2020-01-01 RX ADMIN — MORPHINE SULFATE 1 MG: 2 INJECTION, SOLUTION INTRAMUSCULAR; INTRAVENOUS at 20:21

## 2020-01-01 RX ADMIN — POTASSIUM CHLORIDE AND SODIUM CHLORIDE 75 ML/HR: 900; 150 INJECTION, SOLUTION INTRAVENOUS at 20:10

## 2020-01-01 RX ADMIN — ALPRAZOLAM 1 MG: 1 TABLET ORAL at 08:07

## 2020-01-01 RX ADMIN — IPRATROPIUM BROMIDE AND ALBUTEROL SULFATE 3 ML: 2.5; .5 SOLUTION RESPIRATORY (INHALATION) at 11:08

## 2020-01-01 RX ADMIN — DICLOFENAC SODIUM 4 G: 10 GEL TOPICAL at 12:01

## 2020-01-01 RX ADMIN — SODIUM CHLORIDE 500 ML: 9 INJECTION, SOLUTION INTRAVENOUS at 14:55

## 2020-01-01 RX ADMIN — OLANZAPINE 2.5 MG: 2.5 TABLET ORAL at 08:00

## 2020-01-01 RX ADMIN — SENNOSIDES 2 TABLET: 8.6 TABLET, FILM COATED ORAL at 09:12

## 2020-01-01 RX ADMIN — ACETAMINOPHEN 650 MG: 325 TABLET, FILM COATED ORAL at 15:29

## 2020-01-01 RX ADMIN — ALPRAZOLAM 0.5 MG: 0.5 TABLET ORAL at 10:24

## 2020-01-01 RX ADMIN — ACETAMINOPHEN AND CODEINE PHOSPHATE 2 TABLET: 300; 30 TABLET ORAL at 09:05

## 2020-01-01 RX ADMIN — POTASSIUM CHLORIDE AND SODIUM CHLORIDE 75 ML/HR: 900; 150 INJECTION, SOLUTION INTRAVENOUS at 22:14

## 2020-01-01 RX ADMIN — ASPIRIN 81 MG: 81 TABLET, CHEWABLE ORAL at 09:21

## 2020-01-01 RX ADMIN — ENOXAPARIN SODIUM 40 MG: 40 INJECTION SUBCUTANEOUS at 17:33

## 2020-01-01 RX ADMIN — IPRATROPIUM BROMIDE AND ALBUTEROL SULFATE 3 ML: .5; 3 SOLUTION RESPIRATORY (INHALATION) at 09:15

## 2020-01-01 RX ADMIN — VITAMIN D, TAB 1000IU (100/BT) 1000 UNITS: 25 TAB at 08:37

## 2020-01-01 RX ADMIN — LEFLUNOMIDE 20 MG: 20 TABLET ORAL at 08:54

## 2020-01-01 RX ADMIN — OLANZAPINE 10 MG: 10 TABLET, FILM COATED ORAL at 16:44

## 2020-01-01 RX ADMIN — IPRATROPIUM BROMIDE AND ALBUTEROL SULFATE 3 ML: 2.5; .5 SOLUTION RESPIRATORY (INHALATION) at 19:53

## 2020-01-01 RX ADMIN — MULTIPLE VITAMINS W/ MINERALS TAB 1 TABLET: TAB at 09:21

## 2020-01-01 RX ADMIN — POTASSIUM CHLORIDE 20 MEQ: 10 CAPSULE, COATED, EXTENDED RELEASE ORAL at 09:29

## 2020-01-01 RX ADMIN — LOSARTAN POTASSIUM 100 MG: 100 TABLET, FILM COATED ORAL at 08:29

## 2020-01-01 RX ADMIN — ALPRAZOLAM 1 MG: 1 TABLET ORAL at 16:36

## 2020-01-01 RX ADMIN — LEVOTHYROXINE SODIUM 112 MCG: 112 TABLET ORAL at 06:38

## 2020-01-01 RX ADMIN — ACETAMINOPHEN AND CODEINE PHOSPHATE 2 TABLET: 300; 30 TABLET ORAL at 18:27

## 2020-01-01 RX ADMIN — ACETAMINOPHEN 1000 MG: 500 TABLET, FILM COATED ORAL at 14:41

## 2020-01-01 RX ADMIN — PREDNISONE 20 MG: 20 TABLET ORAL at 08:29

## 2020-01-01 RX ADMIN — SODIUM CHLORIDE, PRESERVATIVE FREE 3 ML: 5 INJECTION INTRAVENOUS at 08:40

## 2020-01-01 RX ADMIN — BUDESONIDE AND FORMOTEROL FUMARATE DIHYDRATE 2 PUFF: 160; 4.5 AEROSOL RESPIRATORY (INHALATION) at 22:11

## 2020-01-01 RX ADMIN — MULTIPLE VITAMINS W/ MINERALS TAB 1 TABLET: TAB at 08:41

## 2020-01-01 RX ADMIN — TAZOBACTAM SODIUM AND PIPERACILLIN SODIUM 3.38 G: 375; 3 INJECTION, SOLUTION INTRAVENOUS at 14:35

## 2020-01-01 RX ADMIN — HALOPERIDOL LACTATE 1 MG: 5 INJECTION, SOLUTION INTRAMUSCULAR at 15:10

## 2020-01-01 RX ADMIN — PANTOPRAZOLE SODIUM 40 MG: 40 TABLET, DELAYED RELEASE ORAL at 06:22

## 2020-01-01 RX ADMIN — SODIUM CHLORIDE 200 ML/HR: 9 INJECTION, SOLUTION INTRAVENOUS at 10:21

## 2020-01-01 RX ADMIN — ENOXAPARIN SODIUM 40 MG: 40 INJECTION SUBCUTANEOUS at 15:29

## 2020-01-01 RX ADMIN — ALPRAZOLAM 1 MG: 1 TABLET ORAL at 00:10

## 2020-01-01 RX ADMIN — DICLOFENAC SODIUM 4 G: 10 GEL TOPICAL at 21:00

## 2020-01-01 RX ADMIN — HYDROCORTISONE SODIUM SUCCINATE 50 MG: 100 INJECTION, POWDER, FOR SOLUTION INTRAMUSCULAR; INTRAVENOUS at 11:55

## 2020-01-01 RX ADMIN — TRAZODONE HYDROCHLORIDE 100 MG: 100 TABLET ORAL at 21:03

## 2020-01-01 RX ADMIN — SENNOSIDES 2 TABLET: 8.6 TABLET, FILM COATED ORAL at 08:32

## 2020-01-01 RX ADMIN — ACETAMINOPHEN AND CODEINE PHOSPHATE 2 TABLET: 300; 30 TABLET ORAL at 06:12

## 2020-01-01 RX ADMIN — OLANZAPINE 5 MG: 5 TABLET ORAL at 18:39

## 2020-01-01 RX ADMIN — ACETAMINOPHEN AND CODEINE PHOSPHATE 2 TABLET: 300; 30 TABLET ORAL at 13:18

## 2020-01-01 RX ADMIN — IPRATROPIUM BROMIDE AND ALBUTEROL SULFATE 3 ML: 2.5; .5 SOLUTION RESPIRATORY (INHALATION) at 11:07

## 2020-01-01 RX ADMIN — PREDNISONE 10 MG: 10 TABLET ORAL at 08:07

## 2020-01-01 RX ADMIN — FUROSEMIDE 20 MG: 20 TABLET ORAL at 08:32

## 2020-01-01 RX ADMIN — LOSARTAN POTASSIUM 50 MG: 50 TABLET, FILM COATED ORAL at 08:54

## 2020-01-01 RX ADMIN — TRAZODONE HYDROCHLORIDE 100 MG: 100 TABLET ORAL at 20:47

## 2020-01-01 RX ADMIN — POLYETHYLENE GLYCOL 3350 17 G: 17 POWDER, FOR SOLUTION ORAL at 08:53

## 2020-01-01 RX ADMIN — AMLODIPINE BESYLATE 5 MG: 5 TABLET ORAL at 08:37

## 2020-01-01 RX ADMIN — IPRATROPIUM BROMIDE AND ALBUTEROL SULFATE 3 ML: 2.5; .5 SOLUTION RESPIRATORY (INHALATION) at 21:20

## 2020-01-01 RX ADMIN — HALOPERIDOL LACTATE 2 MG: 5 INJECTION, SOLUTION INTRAMUSCULAR at 03:59

## 2020-01-01 RX ADMIN — SENNOSIDES 2 TABLET: 8.6 TABLET, FILM COATED ORAL at 20:25

## 2020-01-01 RX ADMIN — IPRATROPIUM BROMIDE AND ALBUTEROL SULFATE 3 ML: 2.5; .5 SOLUTION RESPIRATORY (INHALATION) at 07:13

## 2020-01-01 RX ADMIN — LEVOTHYROXINE SODIUM 112 MCG: 112 TABLET ORAL at 10:22

## 2020-01-01 RX ADMIN — FUROSEMIDE 20 MG: 20 TABLET ORAL at 09:21

## 2020-01-01 RX ADMIN — SENNOSIDES 2 TABLET: 8.6 TABLET, FILM COATED ORAL at 20:41

## 2020-01-01 RX ADMIN — IPRATROPIUM BROMIDE AND ALBUTEROL SULFATE 3 ML: 2.5; .5 SOLUTION RESPIRATORY (INHALATION) at 07:37

## 2020-01-01 RX ADMIN — LEVOTHYROXINE SODIUM 112 MCG: 112 TABLET ORAL at 08:41

## 2020-01-01 RX ADMIN — AMOXICILLIN AND CLAVULANATE POTASSIUM 500 MG: 500; 125 TABLET, FILM COATED ORAL at 20:42

## 2020-01-01 RX ADMIN — DICLOFENAC SODIUM 4 G: 10 GEL TOPICAL at 21:14

## 2020-01-01 RX ADMIN — OLANZAPINE 10 MG: 10 TABLET, FILM COATED ORAL at 21:43

## 2020-01-01 RX ADMIN — METHYLPREDNISOLONE SODIUM SUCCINATE 80 MG: 125 INJECTION, POWDER, FOR SOLUTION INTRAMUSCULAR; INTRAVENOUS at 16:34

## 2020-01-01 RX ADMIN — ALPRAZOLAM 0.5 MG: 0.5 TABLET ORAL at 20:42

## 2020-01-01 RX ADMIN — TRAZODONE HYDROCHLORIDE 100 MG: 100 TABLET ORAL at 21:45

## 2020-01-01 RX ADMIN — DEXAMETHASONE 6 MG: 4 TABLET ORAL at 17:48

## 2020-01-01 RX ADMIN — SENNOSIDES 2 TABLET: 8.6 TABLET, FILM COATED ORAL at 09:42

## 2020-01-01 RX ADMIN — AMLODIPINE BESYLATE 5 MG: 5 TABLET ORAL at 21:09

## 2020-01-01 RX ADMIN — LEFLUNOMIDE 20 MG: 20 TABLET ORAL at 08:47

## 2020-01-01 RX ADMIN — MULTIPLE VITAMINS W/ MINERALS TAB 1 TABLET: TAB at 08:48

## 2020-01-01 RX ADMIN — PANTOPRAZOLE SODIUM 40 MG: 40 TABLET, DELAYED RELEASE ORAL at 05:59

## 2020-01-01 RX ADMIN — PANTOPRAZOLE SODIUM 40 MG: 40 TABLET, DELAYED RELEASE ORAL at 05:20

## 2020-01-01 RX ADMIN — IPRATROPIUM BROMIDE AND ALBUTEROL SULFATE 3 ML: 2.5; .5 SOLUTION RESPIRATORY (INHALATION) at 23:45

## 2020-01-01 RX ADMIN — SENNOSIDES 2 TABLET: 8.6 TABLET, FILM COATED ORAL at 22:11

## 2020-01-01 RX ADMIN — ASPIRIN 81 MG: 81 TABLET, CHEWABLE ORAL at 13:48

## 2020-01-01 RX ADMIN — HALOPERIDOL LACTATE 2 MG: 5 INJECTION, SOLUTION INTRAMUSCULAR at 17:36

## 2020-01-01 RX ADMIN — POTASSIUM CHLORIDE 20 MEQ: 10 CAPSULE, COATED, EXTENDED RELEASE ORAL at 08:29

## 2020-01-01 RX ADMIN — IPRATROPIUM BROMIDE AND ALBUTEROL SULFATE 3 ML: 2.5; .5 SOLUTION RESPIRATORY (INHALATION) at 19:45

## 2020-01-01 RX ADMIN — IPRATROPIUM BROMIDE AND ALBUTEROL SULFATE 3 ML: 2.5; .5 SOLUTION RESPIRATORY (INHALATION) at 23:13

## 2020-01-01 RX ADMIN — IPRATROPIUM BROMIDE AND ALBUTEROL SULFATE 3 ML: 2.5; .5 SOLUTION RESPIRATORY (INHALATION) at 21:17

## 2020-01-01 RX ADMIN — ASPIRIN 81 MG: 81 TABLET, CHEWABLE ORAL at 09:56

## 2020-01-01 RX ADMIN — TRAZODONE HYDROCHLORIDE 100 MG: 100 TABLET ORAL at 21:14

## 2020-01-01 RX ADMIN — DILTIAZEM HYDROCHLORIDE 240 MG: 240 CAPSULE, COATED, EXTENDED RELEASE ORAL at 08:00

## 2020-01-01 RX ADMIN — ACETAMINOPHEN 1000 MG: 500 TABLET, FILM COATED ORAL at 05:59

## 2020-01-01 RX ADMIN — METOPROLOL SUCCINATE 25 MG: 25 TABLET, EXTENDED RELEASE ORAL at 17:33

## 2020-01-01 RX ADMIN — TAZOBACTAM SODIUM AND PIPERACILLIN SODIUM 3.38 G: 375; 3 INJECTION, SOLUTION INTRAVENOUS at 20:18

## 2020-01-01 RX ADMIN — LOSARTAN POTASSIUM 50 MG: 50 TABLET, FILM COATED ORAL at 08:41

## 2020-01-01 RX ADMIN — LORAZEPAM 1 MG: 2 INJECTION INTRAMUSCULAR; INTRAVENOUS at 19:59

## 2020-01-01 RX ADMIN — AMOXICILLIN AND CLAVULANATE POTASSIUM 500 MG: 500; 125 TABLET, FILM COATED ORAL at 07:56

## 2020-01-01 RX ADMIN — SENNOSIDES 2 TABLET: 8.6 TABLET, FILM COATED ORAL at 07:56

## 2020-01-01 RX ADMIN — PANTOPRAZOLE SODIUM 40 MG: 40 TABLET, DELAYED RELEASE ORAL at 05:53

## 2020-01-01 RX ADMIN — VITAMIN D, TAB 1000IU (100/BT) 1000 UNITS: 25 TAB at 08:48

## 2020-01-01 RX ADMIN — ALPRAZOLAM 0.5 MG: 0.5 TABLET ORAL at 09:06

## 2020-01-01 RX ADMIN — LEVOTHYROXINE SODIUM 112 MCG: 112 TABLET ORAL at 06:22

## 2020-01-01 RX ADMIN — POTASSIUM CHLORIDE 20 MEQ: 10 CAPSULE, COATED, EXTENDED RELEASE ORAL at 08:59

## 2020-01-01 RX ADMIN — MORPHINE SULFATE 1 MG: 2 INJECTION, SOLUTION INTRAMUSCULAR; INTRAVENOUS at 07:36

## 2020-01-01 RX ADMIN — SODIUM CHLORIDE 1905 ML: 9 INJECTION, SOLUTION INTRAVENOUS at 22:06

## 2020-01-01 RX ADMIN — Medication 1000 MG: at 09:29

## 2020-01-01 RX ADMIN — HYDROCORTISONE SODIUM SUCCINATE 50 MG: 100 INJECTION, POWDER, FOR SOLUTION INTRAMUSCULAR; INTRAVENOUS at 03:25

## 2020-01-01 RX ADMIN — BUDESONIDE AND FORMOTEROL FUMARATE DIHYDRATE 2 PUFF: 160; 4.5 AEROSOL RESPIRATORY (INHALATION) at 07:53

## 2020-01-01 RX ADMIN — BUDESONIDE AND FORMOTEROL FUMARATE DIHYDRATE 2 PUFF: 160; 4.5 AEROSOL RESPIRATORY (INHALATION) at 19:54

## 2020-01-01 RX ADMIN — PANTOPRAZOLE SODIUM 40 MG: 40 TABLET, DELAYED RELEASE ORAL at 08:13

## 2020-01-01 RX ADMIN — DICLOFENAC SODIUM 4 G: 10 GEL TOPICAL at 12:21

## 2020-01-01 RX ADMIN — ASPIRIN 81 MG: 81 TABLET, CHEWABLE ORAL at 09:06

## 2020-01-01 RX ADMIN — OLANZAPINE 5 MG: 5 TABLET ORAL at 16:33

## 2020-01-01 RX ADMIN — VANCOMYCIN HYDROCHLORIDE 1250 MG: 10 INJECTION, POWDER, LYOPHILIZED, FOR SOLUTION INTRAVENOUS at 13:39

## 2020-01-01 RX ADMIN — SODIUM CHLORIDE, PRESERVATIVE FREE 10 ML: 5 INJECTION INTRAVENOUS at 21:14

## 2020-01-01 RX ADMIN — SENNOSIDES 2 TABLET: 8.6 TABLET, FILM COATED ORAL at 20:52

## 2020-01-01 RX ADMIN — SODIUM CHLORIDE, PRESERVATIVE FREE 3 ML: 5 INJECTION INTRAVENOUS at 20:17

## 2020-01-01 RX ADMIN — LOSARTAN POTASSIUM 50 MG: 50 TABLET, FILM COATED ORAL at 08:14

## 2020-01-01 RX ADMIN — AMLODIPINE BESYLATE 5 MG: 5 TABLET ORAL at 10:23

## 2020-01-01 RX ADMIN — LOSARTAN POTASSIUM 50 MG: 50 TABLET, FILM COATED ORAL at 20:53

## 2020-01-01 RX ADMIN — ALPRAZOLAM 0.5 MG: 0.5 TABLET ORAL at 01:33

## 2020-01-01 RX ADMIN — LEVOTHYROXINE SODIUM 112 MCG: 112 TABLET ORAL at 09:09

## 2020-01-01 RX ADMIN — ALPRAZOLAM 1 MG: 1 TABLET ORAL at 09:28

## 2020-01-01 RX ADMIN — OLANZAPINE 10 MG: 10 TABLET, FILM COATED ORAL at 16:23

## 2020-01-01 RX ADMIN — PANTOPRAZOLE SODIUM 40 MG: 40 TABLET, DELAYED RELEASE ORAL at 06:33

## 2020-01-01 RX ADMIN — METOPROLOL SUCCINATE 25 MG: 25 TABLET, EXTENDED RELEASE ORAL at 20:16

## 2020-01-01 RX ADMIN — SENNOSIDES 2 TABLET: 8.6 TABLET, FILM COATED ORAL at 21:04

## 2020-01-01 RX ADMIN — LORAZEPAM 1 MG: 1 TABLET ORAL at 08:54

## 2020-01-01 RX ADMIN — LORAZEPAM 1 MG: 2 INJECTION INTRAMUSCULAR; INTRAVENOUS at 15:59

## 2020-01-01 RX ADMIN — LOSARTAN POTASSIUM 100 MG: 100 TABLET, FILM COATED ORAL at 09:06

## 2020-01-01 RX ADMIN — DICLOFENAC SODIUM 4 G: 10 GEL TOPICAL at 17:34

## 2020-01-01 RX ADMIN — FUROSEMIDE 20 MG: 20 TABLET ORAL at 08:34

## 2020-01-01 RX ADMIN — LOSARTAN POTASSIUM 50 MG: 50 TABLET, FILM COATED ORAL at 09:28

## 2020-01-01 RX ADMIN — SODIUM CHLORIDE, PRESERVATIVE FREE 10 ML: 5 INJECTION INTRAVENOUS at 08:43

## 2020-01-01 RX ADMIN — LOSARTAN POTASSIUM 100 MG: 100 TABLET, FILM COATED ORAL at 08:00

## 2020-01-01 RX ADMIN — ASPIRIN 81 MG: 81 TABLET, CHEWABLE ORAL at 08:34

## 2020-01-01 RX ADMIN — MORPHINE SULFATE 1 MG: 2 INJECTION, SOLUTION INTRAMUSCULAR; INTRAVENOUS at 13:31

## 2020-01-01 RX ADMIN — OLANZAPINE 10 MG: 10 TABLET, FILM COATED ORAL at 18:21

## 2020-01-01 RX ADMIN — ACETAMINOPHEN AND CODEINE PHOSPHATE 1 TABLET: 300; 30 TABLET ORAL at 10:08

## 2020-01-01 RX ADMIN — OLANZAPINE 5 MG: 5 TABLET ORAL at 21:09

## 2020-01-01 RX ADMIN — ASPIRIN 81 MG: 81 TABLET, CHEWABLE ORAL at 08:52

## 2020-01-01 RX ADMIN — DICLOFENAC SODIUM 4 G: 10 GEL TOPICAL at 08:54

## 2020-01-01 RX ADMIN — AMLODIPINE BESYLATE 5 MG: 5 TABLET ORAL at 09:12

## 2020-01-01 RX ADMIN — LOSARTAN POTASSIUM 50 MG: 50 TABLET, FILM COATED ORAL at 08:33

## 2020-01-01 RX ADMIN — SODIUM CHLORIDE, PRESERVATIVE FREE 3 ML: 5 INJECTION INTRAVENOUS at 21:39

## 2020-01-01 RX ADMIN — POTASSIUM CHLORIDE AND SODIUM CHLORIDE 75 ML/HR: 900; 150 INJECTION, SOLUTION INTRAVENOUS at 03:41

## 2020-01-01 RX ADMIN — SODIUM CHLORIDE, PRESERVATIVE FREE 10 ML: 5 INJECTION INTRAVENOUS at 08:33

## 2020-01-01 RX ADMIN — ACETAMINOPHEN AND CODEINE PHOSPHATE 2 TABLET: 300; 30 TABLET ORAL at 18:10

## 2020-01-01 RX ADMIN — CEFEPIME 2 G: 2 INJECTION, POWDER, FOR SOLUTION INTRAVENOUS at 10:17

## 2020-01-01 RX ADMIN — TAZOBACTAM SODIUM AND PIPERACILLIN SODIUM 3.38 G: 375; 3 INJECTION, SOLUTION INTRAVENOUS at 03:25

## 2020-01-01 RX ADMIN — FUROSEMIDE 40 MG: 40 TABLET ORAL at 09:56

## 2020-01-01 RX ADMIN — ACETAMINOPHEN 1000 MG: 500 TABLET, FILM COATED ORAL at 12:38

## 2020-01-01 RX ADMIN — POTASSIUM CHLORIDE 40 MEQ: 1.5 POWDER, FOR SOLUTION ORAL at 11:59

## 2020-01-01 RX ADMIN — LEVOTHYROXINE SODIUM 112 MCG: 112 TABLET ORAL at 08:53

## 2020-01-01 RX ADMIN — ALPRAZOLAM 1 MG: 1 TABLET ORAL at 08:34

## 2020-01-01 RX ADMIN — ALPRAZOLAM 0.5 MG: 0.5 TABLET ORAL at 08:07

## 2020-01-01 RX ADMIN — VANCOMYCIN HYDROCHLORIDE 1250 MG: 10 INJECTION, POWDER, LYOPHILIZED, FOR SOLUTION INTRAVENOUS at 15:27

## 2020-01-01 RX ADMIN — AMLODIPINE BESYLATE 5 MG: 5 TABLET ORAL at 20:53

## 2020-01-01 RX ADMIN — ENOXAPARIN SODIUM 40 MG: 40 INJECTION SUBCUTANEOUS at 15:34

## 2020-01-01 RX ADMIN — LOSARTAN POTASSIUM 100 MG: 100 TABLET, FILM COATED ORAL at 09:21

## 2020-01-01 RX ADMIN — LEVOTHYROXINE SODIUM 112 MCG: 112 TABLET ORAL at 06:12

## 2020-01-01 RX ADMIN — SENNOSIDES 2 TABLET: 8.6 TABLET, FILM COATED ORAL at 08:00

## 2020-01-01 RX ADMIN — GADOBENATE DIMEGLUMINE 10 ML: 529 INJECTION, SOLUTION INTRAVENOUS at 15:09

## 2020-01-01 RX ADMIN — OLANZAPINE 5 MG: 10 INJECTION, POWDER, LYOPHILIZED, FOR SOLUTION INTRAMUSCULAR at 22:55

## 2020-01-01 RX ADMIN — SODIUM CHLORIDE, PRESERVATIVE FREE 10 ML: 5 INJECTION INTRAVENOUS at 08:00

## 2020-01-01 RX ADMIN — HYDROCORTISONE SODIUM SUCCINATE 100 MG: 250 INJECTION, POWDER, FOR SOLUTION INTRAMUSCULAR; INTRAVENOUS at 10:16

## 2020-01-01 RX ADMIN — ALPRAZOLAM 1 MG: 1 TABLET ORAL at 14:54

## 2020-01-01 RX ADMIN — DILTIAZEM HYDROCHLORIDE 240 MG: 240 CAPSULE, COATED, EXTENDED RELEASE ORAL at 09:57

## 2020-01-01 RX ADMIN — SENNOSIDES 2 TABLET: 8.6 TABLET, FILM COATED ORAL at 21:50

## 2020-01-01 RX ADMIN — ALPRAZOLAM 0.5 MG: 0.5 TABLET ORAL at 23:05

## 2020-01-01 RX ADMIN — OLANZAPINE 10 MG: 10 TABLET, FILM COATED ORAL at 16:34

## 2020-01-01 RX ADMIN — MULTIPLE VITAMINS W/ MINERALS TAB 1 TABLET: TAB at 08:00

## 2020-01-01 RX ADMIN — VITAMIN D, TAB 1000IU (100/BT) 1000 UNITS: 25 TAB at 20:53

## 2020-01-01 RX ADMIN — ACETAMINOPHEN AND CODEINE PHOSPHATE 2 TABLET: 300; 30 TABLET ORAL at 18:45

## 2020-01-01 RX ADMIN — METOPROLOL SUCCINATE 25 MG: 25 TABLET, EXTENDED RELEASE ORAL at 18:39

## 2020-01-01 RX ADMIN — LORAZEPAM 1 MG: 1 TABLET ORAL at 20:15

## 2020-01-01 RX ADMIN — ALPRAZOLAM 1 MG: 1 TABLET ORAL at 21:14

## 2020-01-01 RX ADMIN — AMOXICILLIN AND CLAVULANATE POTASSIUM 500 MG: 500; 125 TABLET, FILM COATED ORAL at 20:47

## 2020-01-01 RX ADMIN — ACETAMINOPHEN AND CODEINE PHOSPHATE 2 TABLET: 300; 30 TABLET ORAL at 03:42

## 2020-01-01 RX ADMIN — IPRATROPIUM BROMIDE AND ALBUTEROL SULFATE 3 ML: 2.5; .5 SOLUTION RESPIRATORY (INHALATION) at 07:04

## 2020-01-01 RX ADMIN — OLANZAPINE 10 MG: 10 TABLET, FILM COATED ORAL at 18:45

## 2020-01-01 RX ADMIN — ALPRAZOLAM 1 MG: 1 TABLET ORAL at 15:29

## 2020-01-01 RX ADMIN — AMLODIPINE BESYLATE 5 MG: 5 TABLET ORAL at 21:14

## 2020-01-01 RX ADMIN — SODIUM CHLORIDE, PRESERVATIVE FREE 10 ML: 5 INJECTION INTRAVENOUS at 21:08

## 2020-01-01 RX ADMIN — FUROSEMIDE 20 MG: 20 TABLET ORAL at 16:44

## 2020-01-01 RX ADMIN — OLANZAPINE 7.5 MG: 7.5 TABLET ORAL at 18:18

## 2020-01-01 RX ADMIN — POLYETHYLENE GLYCOL 3350 17 G: 17 POWDER, FOR SOLUTION ORAL at 09:22

## 2020-01-01 RX ADMIN — AMLODIPINE BESYLATE 5 MG: 5 TABLET ORAL at 08:13

## 2020-01-01 RX ADMIN — POLYETHYLENE GLYCOL 3350 17 G: 17 POWDER, FOR SOLUTION ORAL at 08:29

## 2020-01-01 RX ADMIN — ASPIRIN 81 MG: 81 TABLET, CHEWABLE ORAL at 08:00

## 2020-01-01 RX ADMIN — SODIUM CHLORIDE 200 ML/HR: 9 INJECTION, SOLUTION INTRAVENOUS at 00:38

## 2020-01-01 RX ADMIN — ACETAMINOPHEN AND CODEINE PHOSPHATE 2 TABLET: 300; 30 TABLET ORAL at 01:31

## 2020-01-01 RX ADMIN — LOSARTAN POTASSIUM 100 MG: 100 TABLET, FILM COATED ORAL at 08:59

## 2020-01-01 RX ADMIN — LEVOTHYROXINE SODIUM 112 MCG: 112 TABLET ORAL at 05:59

## 2020-01-01 RX ADMIN — ACETAMINOPHEN 650 MG: 325 TABLET, FILM COATED ORAL at 05:53

## 2020-01-01 RX ADMIN — DICLOFENAC SODIUM 4 G: 10 GEL TOPICAL at 20:15

## 2020-01-01 RX ADMIN — SENNOSIDES 2 TABLET: 8.6 TABLET, FILM COATED ORAL at 20:31

## 2020-01-01 RX ADMIN — IPRATROPIUM BROMIDE AND ALBUTEROL SULFATE 3 ML: 2.5; .5 SOLUTION RESPIRATORY (INHALATION) at 19:33

## 2020-01-01 RX ADMIN — LORAZEPAM 1 MG: 1 TABLET ORAL at 18:52

## 2020-01-01 RX ADMIN — SODIUM CHLORIDE, PRESERVATIVE FREE 10 ML: 5 INJECTION INTRAVENOUS at 20:55

## 2020-01-01 RX ADMIN — PREDNISONE 20 MG: 20 TABLET ORAL at 07:58

## 2020-01-01 RX ADMIN — AMLODIPINE BESYLATE 5 MG: 5 TABLET ORAL at 08:53

## 2020-01-01 RX ADMIN — ASPIRIN 81 MG: 81 TABLET, CHEWABLE ORAL at 08:29

## 2020-01-01 RX ADMIN — PREDNISONE 5 MG: 5 TABLET ORAL at 08:13

## 2020-01-01 RX ADMIN — SENNOSIDES 2 TABLET: 8.6 TABLET, FILM COATED ORAL at 21:14

## 2020-01-01 RX ADMIN — AMLODIPINE BESYLATE 5 MG: 5 TABLET ORAL at 20:16

## 2020-01-01 RX ADMIN — DILTIAZEM HYDROCHLORIDE 240 MG: 240 CAPSULE, COATED, EXTENDED RELEASE ORAL at 09:06

## 2020-01-01 RX ADMIN — SENNOSIDES 2 TABLET: 8.6 TABLET, FILM COATED ORAL at 08:13

## 2020-01-01 RX ADMIN — TRAZODONE HYDROCHLORIDE 100 MG: 100 TABLET ORAL at 21:10

## 2020-01-01 RX ADMIN — VITAMIN D, TAB 1000IU (100/BT) 1000 UNITS: 25 TAB at 08:33

## 2020-01-01 RX ADMIN — HYDROCORTISONE SODIUM SUCCINATE 100 MG: 250 INJECTION, POWDER, FOR SOLUTION INTRAMUSCULAR; INTRAVENOUS at 01:55

## 2020-01-01 RX ADMIN — DICLOFENAC SODIUM 4 G: 10 GEL TOPICAL at 12:54

## 2020-01-01 RX ADMIN — OLANZAPINE 10 MG: 10 TABLET, FILM COATED ORAL at 20:16

## 2020-01-01 RX ADMIN — ACETAMINOPHEN AND CODEINE PHOSPHATE 2 TABLET: 300; 30 TABLET ORAL at 21:20

## 2020-01-01 RX ADMIN — LEFLUNOMIDE 20 MG: 20 TABLET ORAL at 08:33

## 2020-01-01 RX ADMIN — ALPRAZOLAM 0.5 MG: 0.5 TABLET ORAL at 23:36

## 2020-01-01 RX ADMIN — IPRATROPIUM BROMIDE AND ALBUTEROL SULFATE 3 ML: 2.5; .5 SOLUTION RESPIRATORY (INHALATION) at 11:00

## 2020-01-01 RX ADMIN — IPRATROPIUM BROMIDE AND ALBUTEROL SULFATE 3 ML: 2.5; .5 SOLUTION RESPIRATORY (INHALATION) at 11:34

## 2020-01-01 RX ADMIN — DIPHENHYDRAMINE HYDROCHLORIDE 25 MG: 50 INJECTION, SOLUTION INTRAMUSCULAR; INTRAVENOUS at 01:55

## 2020-01-01 RX ADMIN — FUROSEMIDE 20 MG: 20 TABLET ORAL at 16:34

## 2020-01-01 RX ADMIN — SENNOSIDES 2 TABLET: 8.6 TABLET, FILM COATED ORAL at 09:28

## 2020-01-01 RX ADMIN — ACETAMINOPHEN AND CODEINE PHOSPHATE 2 TABLET: 300; 30 TABLET ORAL at 06:33

## 2020-01-01 RX ADMIN — TAZOBACTAM SODIUM AND PIPERACILLIN SODIUM 3.38 G: 375; 3 INJECTION, SOLUTION INTRAVENOUS at 03:41

## 2020-01-01 RX ADMIN — METOPROLOL SUCCINATE 25 MG: 25 TABLET, EXTENDED RELEASE ORAL at 16:22

## 2020-01-01 RX ADMIN — SODIUM CHLORIDE, PRESERVATIVE FREE 10 ML: 5 INJECTION INTRAVENOUS at 21:15

## 2020-01-01 RX ADMIN — HYDROMORPHONE HYDROCHLORIDE 1 MG: 2 TABLET ORAL at 01:45

## 2020-01-01 RX ADMIN — TRAZODONE HYDROCHLORIDE 100 MG: 100 TABLET ORAL at 20:46

## 2020-01-01 RX ADMIN — DICLOFENAC SODIUM 4 G: 10 GEL TOPICAL at 21:39

## 2020-01-01 RX ADMIN — SENNOSIDES 2 TABLET: 8.6 TABLET, FILM COATED ORAL at 08:07

## 2020-01-01 RX ADMIN — SENNOSIDES 2 TABLET: 8.6 TABLET, FILM COATED ORAL at 20:13

## 2020-01-01 RX ADMIN — SODIUM CHLORIDE, PRESERVATIVE FREE 10 ML: 5 INJECTION INTRAVENOUS at 21:10

## 2020-01-01 RX ADMIN — Medication 1000 MG: at 08:47

## 2020-01-01 RX ADMIN — TAZOBACTAM SODIUM AND PIPERACILLIN SODIUM 3.38 G: 375; 3 INJECTION, SOLUTION INTRAVENOUS at 11:54

## 2020-01-01 RX ADMIN — ASPIRIN 81 MG: 81 TABLET, CHEWABLE ORAL at 09:24

## 2020-01-01 RX ADMIN — LEVOTHYROXINE SODIUM 112 MCG: 112 TABLET ORAL at 06:33

## 2020-01-01 RX ADMIN — ACETAMINOPHEN AND CODEINE PHOSPHATE 2 TABLET: 300; 30 TABLET ORAL at 10:56

## 2020-01-01 RX ADMIN — SODIUM CHLORIDE, PRESERVATIVE FREE 10 ML: 5 INJECTION INTRAVENOUS at 20:21

## 2020-01-01 RX ADMIN — SENNOSIDES 2 TABLET: 8.6 TABLET, FILM COATED ORAL at 08:34

## 2020-01-01 RX ADMIN — ACETAMINOPHEN 1000 MG: 500 TABLET, FILM COATED ORAL at 20:53

## 2020-01-01 RX ADMIN — SENNOSIDES 2 TABLET: 8.6 TABLET, FILM COATED ORAL at 20:16

## 2020-01-01 RX ADMIN — ALPRAZOLAM 0.5 MG: 0.5 TABLET ORAL at 10:56

## 2020-01-01 RX ADMIN — ALPRAZOLAM 1 MG: 1 TABLET ORAL at 16:46

## 2020-01-01 RX ADMIN — ALPRAZOLAM 1 MG: 1 TABLET ORAL at 17:33

## 2020-01-01 RX ADMIN — VITAMIN D, TAB 1000IU (100/BT) 1000 UNITS: 25 TAB at 08:40

## 2020-01-01 RX ADMIN — DOXYCYCLINE 100 MG: 100 CAPSULE ORAL at 22:01

## 2020-01-01 RX ADMIN — POLYETHYLENE GLYCOL 3350 17 G: 17 POWDER, FOR SOLUTION ORAL at 09:56

## 2020-01-01 RX ADMIN — PREDNISONE 20 MG: 20 TABLET ORAL at 09:21

## 2020-01-01 RX ADMIN — PANTOPRAZOLE SODIUM 40 MG: 40 TABLET, DELAYED RELEASE ORAL at 08:04

## 2020-01-01 RX ADMIN — OLANZAPINE 5 MG: 5 TABLET ORAL at 17:13

## 2020-01-01 RX ADMIN — SENNOSIDES 2 TABLET: 8.6 TABLET, FILM COATED ORAL at 08:29

## 2020-01-01 RX ADMIN — LEVOTHYROXINE SODIUM 112 MCG: 112 TABLET ORAL at 07:58

## 2020-01-01 RX ADMIN — ASPIRIN 81 MG: 81 TABLET, CHEWABLE ORAL at 08:08

## 2020-01-01 RX ADMIN — SODIUM CHLORIDE, PRESERVATIVE FREE 10 ML: 5 INJECTION INTRAVENOUS at 20:02

## 2020-01-01 RX ADMIN — Medication 1000 MG: at 08:37

## 2020-01-01 RX ADMIN — Medication 1.5 MCG/KG/MIN: at 06:05

## 2020-01-01 RX ADMIN — ALPRAZOLAM 1 MG: 1 TABLET ORAL at 08:52

## 2020-01-01 RX ADMIN — PREDNISONE 5 MG: 5 TABLET ORAL at 08:41

## 2020-01-01 RX ADMIN — ACETAMINOPHEN AND CODEINE PHOSPHATE 2 TABLET: 300; 30 TABLET ORAL at 21:14

## 2020-01-01 RX ADMIN — DEXAMETHASONE 6 MG: 4 TABLET ORAL at 09:31

## 2020-01-01 RX ADMIN — LEVOTHYROXINE SODIUM 112 MCG: 112 TABLET ORAL at 07:25

## 2020-01-01 RX ADMIN — PANTOPRAZOLE SODIUM 40 MG: 40 TABLET, DELAYED RELEASE ORAL at 08:34

## 2020-01-01 RX ADMIN — ACETAMINOPHEN AND CODEINE PHOSPHATE 2 TABLET: 300; 30 TABLET ORAL at 17:48

## 2020-01-01 RX ADMIN — IPRATROPIUM BROMIDE AND ALBUTEROL SULFATE 3 ML: 2.5; .5 SOLUTION RESPIRATORY (INHALATION) at 07:48

## 2020-01-01 RX ADMIN — Medication 1000 MG: at 08:41

## 2020-01-01 RX ADMIN — DICLOFENAC SODIUM 4 G: 10 GEL TOPICAL at 08:55

## 2020-01-01 RX ADMIN — IPRATROPIUM BROMIDE AND ALBUTEROL SULFATE 3 ML: 2.5; .5 SOLUTION RESPIRATORY (INHALATION) at 14:37

## 2020-01-01 RX ADMIN — IPRATROPIUM BROMIDE AND ALBUTEROL SULFATE 3 ML: 2.5; .5 SOLUTION RESPIRATORY (INHALATION) at 19:37

## 2020-01-01 RX ADMIN — VANCOMYCIN HYDROCHLORIDE 1250 MG: 10 INJECTION, POWDER, LYOPHILIZED, FOR SOLUTION INTRAVENOUS at 00:21

## 2020-01-01 RX ADMIN — SODIUM CHLORIDE, PRESERVATIVE FREE 3 ML: 5 INJECTION INTRAVENOUS at 08:55

## 2020-01-01 RX ADMIN — MULTIPLE VITAMINS W/ MINERALS TAB 1 TABLET: TAB at 08:14

## 2020-01-01 RX ADMIN — TAZOBACTAM SODIUM AND PIPERACILLIN SODIUM 3.38 G: 375; 3 INJECTION, SOLUTION INTRAVENOUS at 04:55

## 2020-01-01 RX ADMIN — SODIUM CHLORIDE 200 ML/HR: 9 INJECTION, SOLUTION INTRAVENOUS at 06:05

## 2020-01-01 RX ADMIN — IPRATROPIUM BROMIDE AND ALBUTEROL SULFATE 3 ML: 2.5; .5 SOLUTION RESPIRATORY (INHALATION) at 19:50

## 2020-01-01 RX ADMIN — ACETAMINOPHEN 1000 MG: 500 TABLET, FILM COATED ORAL at 21:08

## 2020-01-01 RX ADMIN — ASPIRIN 81 MG: 81 TABLET, CHEWABLE ORAL at 08:32

## 2020-01-01 RX ADMIN — AMOXICILLIN AND CLAVULANATE POTASSIUM 500 MG: 500; 125 TABLET, FILM COATED ORAL at 20:46

## 2020-01-01 RX ADMIN — ACETAMINOPHEN AND CODEINE PHOSPHATE 2 TABLET: 300; 30 TABLET ORAL at 23:05

## 2020-01-01 RX ADMIN — IPRATROPIUM BROMIDE AND ALBUTEROL SULFATE 3 ML: 2.5; .5 SOLUTION RESPIRATORY (INHALATION) at 04:15

## 2020-01-01 RX ADMIN — DICLOFENAC SODIUM 4 G: 10 GEL TOPICAL at 17:31

## 2020-01-01 RX ADMIN — PANTOPRAZOLE SODIUM 40 MG: 40 TABLET, DELAYED RELEASE ORAL at 06:16

## 2020-01-01 RX ADMIN — POLYETHYLENE GLYCOL 3350 17 G: 17 POWDER, FOR SOLUTION ORAL at 09:12

## 2020-01-01 RX ADMIN — ALPRAZOLAM 0.5 MG: 0.5 TABLET ORAL at 12:27

## 2020-01-01 RX ADMIN — LORAZEPAM 1 MG: 2 INJECTION INTRAMUSCULAR; INTRAVENOUS at 01:57

## 2020-01-01 RX ADMIN — POTASSIUM CHLORIDE 20 MEQ: 10 CAPSULE, COATED, EXTENDED RELEASE ORAL at 08:00

## 2020-01-01 RX ADMIN — DICLOFENAC SODIUM 4 G: 10 GEL TOPICAL at 12:15

## 2020-01-01 RX ADMIN — METOPROLOL SUCCINATE 25 MG: 25 TABLET, EXTENDED RELEASE ORAL at 17:13

## 2020-01-01 RX ADMIN — MORPHINE SULFATE 1 MG: 2 INJECTION, SOLUTION INTRAMUSCULAR; INTRAVENOUS at 08:12

## 2020-01-01 RX ADMIN — OLANZAPINE 10 MG: 10 TABLET, FILM COATED ORAL at 17:32

## 2020-01-01 RX ADMIN — SENNOSIDES 2 TABLET: 8.6 TABLET, FILM COATED ORAL at 20:46

## 2020-01-01 RX ADMIN — POTASSIUM CHLORIDE 40 MEQ: 10 CAPSULE, COATED, EXTENDED RELEASE ORAL at 16:54

## 2020-01-01 RX ADMIN — LEVOTHYROXINE SODIUM 112 MCG: 112 TABLET ORAL at 08:14

## 2020-02-06 NOTE — TELEPHONE ENCOUNTER
Returned phone call. Spoke with patient's spouse, Pa. He states the patient's bowels moved. He asked if there is some type of medication other than Movantik for the patient to take. Advised it has been over a year since the patient has been seen in the office. F/u visit scheduled for 3/10@1430 with Dr. Townsend. He verb understanding and will relay message to the patient.

## 2020-02-06 NOTE — TELEPHONE ENCOUNTER
----- Message from Lillian Foote sent at 2/6/2020 10:50 AM EST -----  Regarding: Question  Contact: [phone]  Pt is calling asking if we do impactions in the office, and asking if not what medication she can take to help. She is trying to avoid the ER

## 2020-03-10 NOTE — PROGRESS NOTES
Chief Complaint   Patient presents with   • Constipation       Yeni Jimenez is a  87 y.o. female here for a follow up visit for chronic constipation.    HPI     Patient 87-year-old female with history of hypertension, hypothyroid, osteoporosis and osteoarthritis as well as rheumatoid arthritis presenting with ongoing constipation.  Patient reports typically will be constipated until she has diarrhea.  Patient apparently been given Lomotil in the past for the diarrhea sounds like she is likely altering back and forth between treatment of her constipation which then leads to diarrhea then treats a diarrhea which leads to constipation.  Patient here for further recommendations.    Past Medical History:   Diagnosis Date   • Anxiety    • Arthritis    • Chronic back pain    • Disease of thyroid gland    • Hypertension    • Hypothyroidism    • Left bundle branch block    • Osteoarthritis    • Osteoporosis    • Palpitation    • Rheumatoid arthritis (CMS/HCC)    • 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HCC)          Current Outpatient Medications:   •  acetaminophen (TYLENOL) 160 MG/5ML solution, Take 640 mg by mouth Every 4 (Four) Hours As Needed for Mild Pain  (Takes with Codeine). PATIENT TOLERATES ROSSI FLAVOR ONLY, Disp: , Rfl:   •  acetaminophen-codeine (TYLENOL #3) 300-30 MG per tablet, , Disp: , Rfl:   •  ALPRAZolam (XANAX) 1 MG tablet, Take 1 mg by mouth 5 (Five) Times a Day As Needed for Anxiety., Disp: , Rfl:   •  amLODIPine (NORVASC) 5 MG tablet, Take 5 mg by mouth 2 (Two) Times a Day., Disp: , Rfl:   •  Cholecalciferol (VITAMIN D-3) 1000 UNITS capsule, Take 1,000 Units by mouth Daily., Disp: , Rfl:   •  diclofenac (VOLTAREN) 1 % gel gel, See Admin Instructions., Disp: , Rfl:   •  diphenhydrAMINE (BENADRYL) 25 mg capsule, Take 25 mg by mouth Every 6 (Six) Hours As Needed (Difficulty swallowing)., Disp: , Rfl:   •  diphenoxylate-atropine (LOMOTIL) 2.5-0.025 MG per tablet, diphenoxylate-atropine 2.5 mg-0.025 mg tablet, Disp:  , Rfl:   •  lansoprazole (PREVACID) 30 MG capsule, Take 30 mg by mouth 2 (Two) Times a Day., Disp: , Rfl:   •  leflunomide (ARAVA) 20 MG tablet, Take 20 mg by mouth Daily., Disp: , Rfl:   •  levothyroxine (SYNTHROID, LEVOTHROID) 112 MCG tablet, Take 112 mcg by mouth Daily., Disp: , Rfl:   •  lidocaine (LIDODERM) 5 %, Place 1 patch on the skin as directed by provider Daily As Needed for Mild Pain . Remove & Discard patch within 12 hours or as directed by MD, Disp: , Rfl:   •  losartan (COZAAR) 50 MG tablet, Take 50 mg by mouth 2 (Two) Times a Day., Disp: , Rfl:   •  metoprolol succinate XL (TOPROL-XL) 25 MG 24 hr tablet, Take 25 mg by mouth Every Evening., Disp: , Rfl:   •  Multiple Vitamins-Minerals (MULTIVITAMIN WITH MINERALS) tablet tablet, Take 1 tablet by mouth Daily., Disp: , Rfl:   •  Multiple Vitamins-Minerals (PRESERVISION/LUTEIN) capsule, Take 1 capsule by mouth Daily., Disp: , Rfl:   •  Naloxegol Oxalate (MOVANTIK) 25 MG tablet, Take 25 mg by mouth Daily., Disp: , Rfl:   •  PB-Hyoscy-Atropine-Scopolamine (DONNATAL) 16.2 MG per tablet, Donnatal 16.2 mg-0.1037 mg-0.0194 mg tablet  Take as needed for irritable bowel., Disp: , Rfl:   •  polyethylene glycol (MIRALAX) packet, Take 17 g by mouth Daily. (Patient taking differently: Take 17 g by mouth Every Other Day.), Disp: 100 each, Rfl: 0  •  predniSONE (DELTASONE) 5 MG tablet, prednisone 5 mg tablet, Disp: , Rfl:   •  salsalate (DISALCID) 500 MG tablet, Take 1,000 mg by mouth 3 (Three) Times a Day As Needed., Disp: , Rfl:   •  senna (SENOKOT) 8.6 MG tablet tablet, Take 2 tablets by mouth 2 (Two) Times a Day., Disp: , Rfl:   •  traZODone (DESYREL) 50 MG tablet, Take 50 mg by mouth Every Night., Disp: , Rfl:   •  ascorbic acid (VITAMIN C) 1000 MG tablet, Take 1,000 mg by mouth Daily., Disp: , Rfl:   •  ipratropium-albuterol (DUO-NEB) 0.5-2.5 mg/3 ml nebulizer, Inhale 3 mL 3 (Three) Times a Day As Needed., Disp: , Rfl:     Allergies   Allergen Reactions   •  Marigold [[place] Officinalis (Marigold)] Other (See Comments)     Patient states she is not allergic   • Sulfa Antibiotics    • Sulfites Swelling     Made her face swells and caused her not to breath       Social History     Socioeconomic History   • Marital status:      Spouse name: Not on file   • Number of children: Not on file   • Years of education: Not on file   • Highest education level: Not on file   Tobacco Use   • Smoking status: Never Smoker   • Smokeless tobacco: Never Used   • Tobacco comment: caff use   Substance and Sexual Activity   • Alcohol use: No   • Drug use: No   • Sexual activity: Defer       Family History   Problem Relation Age of Onset   • Stomach cancer Paternal Grandmother    • Cancer Mother    • Stroke Father    • Aneurysm Father    • Diabetes Sister        Review of Systems   Constitutional: Negative.    Respiratory: Negative.    Cardiovascular: Negative.    Gastrointestinal: Positive for abdominal pain, constipation and diarrhea. Negative for abdominal distention, anal bleeding, blood in stool, nausea, rectal pain and vomiting.   Skin: Negative.    Hematological: Negative.        Vitals:    03/10/20 1449   BP: 164/84   Temp: 99.1 °F (37.3 °C)       Physical Exam   Constitutional: She is oriented to person, place, and time. She appears well-developed and well-nourished.   HENT:   Head: Normocephalic and atraumatic.   Eyes: No scleral icterus.   Cardiovascular: Normal rate, regular rhythm and normal heart sounds.   Pulmonary/Chest: Effort normal and breath sounds normal.   Abdominal: Soft. Bowel sounds are normal. She exhibits no distension and no mass. There is no tenderness. No hernia.   Neurological: She is alert and oriented to person, place, and time.   Skin: Skin is warm and dry.   Psychiatric: She has a normal mood and affect. Her behavior is normal.   Vitals reviewed.      No visits with results within 2 Month(s) from this visit.   Latest known visit with results is:    Admission on 10/01/2019, Discharged on 10/01/2019   Component Date Value Ref Range Status   • Glucose 10/01/2019 125* 65 - 99 mg/dL Final   • BUN 10/01/2019 13  8 - 23 mg/dL Final   • Creatinine 10/01/2019 0.42* 0.57 - 1.00 mg/dL Final   • Sodium 10/01/2019 135* 136 - 145 mmol/L Final   • Potassium 10/01/2019 3.3* 3.5 - 5.2 mmol/L Final   • Chloride 10/01/2019 95* 98 - 107 mmol/L Final   • CO2 10/01/2019 24.8  22.0 - 29.0 mmol/L Final   • Calcium 10/01/2019 9.7  8.6 - 10.5 mg/dL Final   • Total Protein 10/01/2019 7.2  6.0 - 8.5 g/dL Final   • Albumin 10/01/2019 4.10  3.50 - 5.20 g/dL Final   • ALT (SGPT) 10/01/2019 13  1 - 33 U/L Final   • AST (SGOT) 10/01/2019 21  1 - 32 U/L Final   • Alkaline Phosphatase 10/01/2019 35* 39 - 117 U/L Final   • Total Bilirubin 10/01/2019 0.5  0.2 - 1.2 mg/dL Final   • eGFR Non African Amer 10/01/2019 143  >60 mL/min/1.73 Final   • Globulin 10/01/2019 3.1  gm/dL Final   • A/G Ratio 10/01/2019 1.3  g/dL Final   • BUN/Creatinine Ratio 10/01/2019 31.0* 7.0 - 25.0 Final   • Anion Gap 10/01/2019 15.2* 5.0 - 15.0 mmol/L Final   • Color, UA 10/01/2019 Yellow  Yellow, Straw Final   • Appearance, UA 10/01/2019 Clear  Clear Final   • pH, UA 10/01/2019 7.0  5.0 - 8.0 Final   • Specific Gravity, UA 10/01/2019 1.017  1.005 - 1.030 Final   • Glucose, UA 10/01/2019 Negative  Negative Final   • Ketones, UA 10/01/2019 Trace* Negative Final   • Bilirubin, UA 10/01/2019 Negative  Negative Final   • Blood, UA 10/01/2019 Trace* Negative Final   • Protein, UA 10/01/2019 100 mg/dL (2+)* Negative Final   • Leuk Esterase, UA 10/01/2019 Negative  Negative Final   • Nitrite, UA 10/01/2019 Negative  Negative Final   • Urobilinogen, UA 10/01/2019 0.2 E.U./dL  0.2 - 1.0 E.U./dL Final   • WBC 10/01/2019 11.51* 3.40 - 10.80 10*3/mm3 Final   • RBC 10/01/2019 5.03  3.77 - 5.28 10*6/mm3 Final   • Hemoglobin 10/01/2019 15.6  12.0 - 15.9 g/dL Final   • Hematocrit 10/01/2019 46.6  34.0 - 46.6 % Final   • MCV 10/01/2019  92.6  79.0 - 97.0 fL Final   • MCH 10/01/2019 31.0  26.6 - 33.0 pg Final   • MCHC 10/01/2019 33.5  31.5 - 35.7 g/dL Final   • RDW 10/01/2019 13.7  12.3 - 15.4 % Final   • RDW-SD 10/01/2019 46.8  37.0 - 54.0 fl Final   • MPV 10/01/2019 9.6  6.0 - 12.0 fL Final   • Platelets 10/01/2019 242  140 - 450 10*3/mm3 Final   • Neutrophil % 10/01/2019 84.7* 42.7 - 76.0 % Final   • Lymphocyte % 10/01/2019 7.0* 19.6 - 45.3 % Final   • Monocyte % 10/01/2019 6.9  5.0 - 12.0 % Final   • Eosinophil % 10/01/2019 0.1* 0.3 - 6.2 % Final   • Basophil % 10/01/2019 0.4  0.0 - 1.5 % Final   • Immature Grans % 10/01/2019 0.9* 0.0 - 0.5 % Final   • Neutrophils, Absolute 10/01/2019 9.76* 1.70 - 7.00 10*3/mm3 Final   • Lymphocytes, Absolute 10/01/2019 0.80  0.70 - 3.10 10*3/mm3 Final   • Monocytes, Absolute 10/01/2019 0.79  0.10 - 0.90 10*3/mm3 Final   • Eosinophils, Absolute 10/01/2019 0.01  0.00 - 0.40 10*3/mm3 Final   • Basophils, Absolute 10/01/2019 0.05  0.00 - 0.20 10*3/mm3 Final   • Immature Grans, Absolute 10/01/2019 0.10* 0.00 - 0.05 10*3/mm3 Final   • nRBC 10/01/2019 0.0  0.0 - 0.2 /100 WBC Final   • RBC, UA 10/01/2019 0-2  None Seen, 0-2 /HPF Final   • WBC, UA 10/01/2019 0-2  None Seen, 0-2 /HPF Final   • Bacteria, UA 10/01/2019 None Seen  None Seen /HPF Final   • Squamous Epithelial Cells, UA 10/01/2019 3-6* None Seen, 0-2 /HPF Final   • Hyaline Casts, UA 10/01/2019 3-6  None Seen /LPF Final   • Methodology 10/01/2019 Manual Light Microscopy   Final       Yeni was seen today for constipation.    Diagnoses and all orders for this visit:    Chronic idiopathic constipation      Patient 87-year-old female with a history of chronic constipation taking multiple medications also on narcotic for chronic back pain.  Patient not taking the Movantik offered last visit is on twice daily Senokot as well as homemade prunes but reports numerous other courses of medication ended up causing diarrhea looks like she ends up taking Lomotil to treat  the diarrhea which then causes recurrent constipation.  Patient presents now  offering a natural products that contains senna along with a number of different fiber sources and herbals.  Recommended doses 2 pills 3 times a day for day 1 then 2 pills daily after that.  Patient encouraged that it is okay to try but not along with the Senokot as it already has senna in it.  Patient also encouraged to make sure she drinks plenty of fluids and it is okay to continue the prunes and to call with response so we can make adjustments as needed rather than use Lomotil.  Patient with history of negative virtual colonoscopy reported interest in whether she needs repeat screening but at this point would recommend okay to use Cologuard with the idea however that if it is positive she would proceed with colonoscopy.  If patient not interested in colonoscopy as an option then screening would not be recommended.

## 2020-05-11 NOTE — TELEPHONE ENCOUNTER
Attempt to call pt regarding her appointment for next tuesday. He is out of the office and will need to be r/s.      No answer and was unable to LVM.     Thanks Deborah

## 2020-05-18 PROBLEM — R41.82 ALTERED MENTAL STATUS: Status: ACTIVE | Noted: 2020-01-01

## 2020-05-21 PROBLEM — M25.562 ACUTE PAIN OF LEFT KNEE: Status: ACTIVE | Noted: 2020-01-01

## 2020-05-21 PROBLEM — M54.9 CHRONIC BACK PAIN: Status: ACTIVE | Noted: 2020-01-01

## 2020-05-21 PROBLEM — G89.29 CHRONIC BACK PAIN: Status: ACTIVE | Noted: 2020-01-01

## 2020-05-21 PROBLEM — M19.90 OSTEOARTHRITIS: Status: ACTIVE | Noted: 2020-01-01

## 2020-05-21 PROBLEM — E03.9 HYPOTHYROIDISM: Status: ACTIVE | Noted: 2020-01-01

## 2020-05-21 PROBLEM — F41.9 ANXIETY: Status: ACTIVE | Noted: 2020-01-01

## 2020-05-21 NOTE — ANESTHESIA PROCEDURE NOTES
Left knee diagnostic genicular nerve blocks (87467)    Pre-sedation assessment completed: 5/21/2020 2:25 PM    Patient reassessed immediately prior to procedure    Patient location during procedure: pain clinic  Start time: 5/21/2020 3:25 PM  Stop time: 5/21/2020 3:45 PM  Reason for block: procedure for pain and at surgeon's request  Performed by  Anesthesiologist: Lico Clark MD  Preanesthetic Checklist  Completed: patient identified, site marked, surgical consent, pre-op evaluation, timeout performed, IV checked, risks and benefits discussed and monitors and equipment checked  Prep:  Pt Position: supine  Sterile barriers:cap, gloves, sterile barriers and mask  Prep: ChloraPrep  Patient monitoring: blood pressure monitoring, continuous pulse oximetry and EKG  Procedure  Sedation:yes  Performed under: local infiltration  Guidance:Fluoroscopic guidance  Images:still images obtained    Laterality:left  Anesthesia block type: Genicular nerve block.  Injection Technique:single-shot  Needle Type:Quincke  Needle Gauge:25 G  Resistance on Injection: none    Medications Used: ropivacaine (NAROPIN) 0.5 % injection, 21 mL  Med admintered at 5/21/2020 3:45 PM      Medications  Comment:7 cc of medication was dispensed at each of the 3 locations.  X-ray imaging was slightly complicated by the fact that we could not get her right leg in a comfortable position it was out of the way of her x-ray machine.  However, needle placement looked to be very good including the inferior medial genicular nerve, the view of which was somewhat obscured by her right leg.    Post Assessment  Injection Assessment: negative aspiration for heme, no paresthesia on injection and incremental injection  Patient Tolerance:comfortable throughout block  Complications:no  Additional Notes  Fluoroscopic guidance used for location of targets, insertion of needles, injection of medication, and placement confirmation at the superior lateral genicular  nerve, superior medial genicular nerve, and the inferior medial genicular nerve.  Standard technique was used.  The technique was sterile throughout.  The patient was observed in recovery room for adequate amount of time to ensure stability.  Then she was sent back to her room on the floor.    Post-Op Diagnosis Codes:     * Left knee pain (M25.562)     * Degenerative joint disease of knee, left (M17.12)

## 2020-05-28 NOTE — OUTREACH NOTE
Prep Survey      Responses   [facility] facility patient discharged from?  [place]   Is LACE score < 7 ?  No   Eligibility  Readm Mgmt   Discharge diagnosis  AMS, acute pain of left knee, anxiety, HTN, generalized weakness   COVID-19 Test Status  Not tested   Does the patient have one of the following disease processes/diagnoses(primary or secondary)?  Other   Does the patient have Home health ordered?  Yes   What is the Home health agency?   [facility] private [facility]   Is there a DME ordered?  Yes   What DME was ordered?  [facility] for hospital bed   Prep survey completed?  Yes          Pauline Carson RN

## 2020-06-02 NOTE — OUTREACH NOTE
Medical Week 1 Survey      Responses   [facility] [place] patient discharged from?  [place]   COVID-19 Test Status  Not tested   Does the patient have one of the following disease processes/diagnoses(primary or secondary)?  Other   Is there a successful TCM telephone encounter documented?  No   Week 1 attempt successful?  Yes   Call start time  1017   Call end time  1024   Discharge diagnosis  AMS, acute pain of left knee, anxiety, HTN, generalized weakness   Is patient permission given to speak with other caregiver?  Yes   Person spoke with today (if not patient) and relationship  , Pa Lopez reviewed with patient/caregiver?  Yes   Is the patient having any side effects they believe may be caused by any medication additions or changes?  No   Does the patient have all medications ordered at discharge?  Yes   Is the patient taking all medications as directed (includes completed medication regime)?  Yes   Does the patient have a primary care provider?   Yes   Has the patient kept scheduled appointments due by today?  N/A   What is the Home health agency?    [facility] and private caregivers   Has home health visited the patient within 72 hours of discharge?  Yes   Home health comments  Home Health nurse visited yesterday PT to visit today   What DME was ordered?  [facility] for hospital bed, awaiting for a new hospital bed   Has all DME been delivered?  Yes   Pulse Ox monitoring  None   Psychosocial issues?  No   Did the patient receive a copy of their discharge instructions?  Yes   Nursing interventions  Reviewed instructions with patient   What is the patient's perception of their health status since discharge?  Improving   Is the patient/caregiver able to teach back the hierarchy of who to call/visit for symptoms/problems? PCP, Specialist, Home health nurse, Urgent Care, ED, 911  Yes   Week 1 call completed?  Yes          Neena Coronado RN

## 2020-06-08 NOTE — OUTREACH NOTE
Medical Week 2 Survey      Responses   [facility] patient discharged from?  [place]   COVID-19 Test Status  Not tested   Does the patient have one of the following disease processes/diagnoses(primary or secondary)?  Other   Week 2 attempt successful?  Yes   Call start time  1618   Discharge diagnosis  AMS, acute pain of left knee, anxiety, HTN, generalized weakness   Call end time  1624   Meds reviewed with patient/caregiver?  Yes   Is the patient having any side effects they believe may be caused by any medication additions or changes?  No   Does the patient have all medications ordered at discharge?  Yes   Is the patient taking all medications as directed (includes completed medication regime)?  Yes   Does the patient have a primary care provider?   Yes   Does the patient have an appointment with their PCP within 7 days of discharge?  Yes   Has the patient kept scheduled appointments due by today?  N/A   What is the Home health agency?   [facility] and private caregivers   Has home health visited the patient within 72 hours of discharge?  Yes   Psychosocial issues?  No   Comments   states pt responding very well to HH PT   Did the patient receive a copy of their discharge instructions?  Yes   Nursing interventions  Reviewed instructions with patient   What is the patient's perception of their health status since discharge?  Improving   Is the patient/caregiver able to teach back signs and symptoms related to disease process for when to call PCP?  Yes   Is the patient/caregiver able to teach back signs and symptoms related to disease process for when to call 911?  Yes   Is the patient/caregiver able to teach back the hierarchy of who to call/visit for symptoms/problems? PCP, Specialist, Home health nurse, Urgent Care, ED, 911  Yes   Additional teach back comments   states eating and drinking adequately, somewhat coherent and not confused, at times   Week 2 Call Completed?  Yes          Desirae MACHADO  [place], RN

## 2020-06-23 NOTE — OUTREACH NOTE
"Medical Week 4 Survey      Responses   [facility] patient discharged from?  [place]   COVID-19 Test Status  Not tested   Does the patient have one of the following disease processes/diagnoses(primary or secondary)?  Other   Week 4 attempt successful?  Yes   Call start time  1301   Call end time  1303   Discharge diagnosis  AMS, acute pain of left knee, anxiety, HTN, generalized weakness   Person spoke with today (if not patient) and relationship  -[facility]   Meds reviewed with patient/caregiver?  Yes   Is the patient taking all medications as directed (includes completed medication regime)?  Yes   Has the patient kept scheduled appointments due by today?  Yes   Comments  Mr. Jimenez reports \"we don't see Dr. Woodruff. There's not real demand ther. We'll see what the interest says and go from there.\"    Is the patient still receiving Home Health Services?  Yes   Pulse Ox monitoring  Intermittent   Pulse Ox device source  Patient   O2 Sat comments  O2 sat was 93% on room air   What is the patient's perception of their health status since discharge?  Improving   If the patient is a current smoker, are they able to teach back resources for cessation?  -- [Nonsmoker]   Week 4 Call Completed?  Yes   Would the patient like one additional call?  No   Graduated  Yes   Did the patient feel the follow up calls were helpful during their recovery period?  Yes   Was the number of calls appropriate?  Yes          Yuli Sanches RN  "

## 2020-07-25 PROBLEM — J18.9 COMMUNITY ACQUIRED PNEUMONIA OF RIGHT UPPER LOBE OF LUNG: Status: ACTIVE | Noted: 2020-01-01

## 2020-07-26 PROBLEM — M06.9 RHEUMATOID ARTHRITIS INVOLVING MULTIPLE SITES (HCC): Status: ACTIVE | Noted: 2020-01-01

## 2020-07-26 PROBLEM — Z79.899 POLYPHARMACY: Status: ACTIVE | Noted: 2020-01-01

## 2020-07-26 PROBLEM — J96.01 ACUTE RESPIRATORY FAILURE WITH HYPOXIA (HCC): Status: ACTIVE | Noted: 2020-01-01

## 2020-07-26 PROBLEM — I49.5 TACHY-BRADY SYNDROME (HCC): Status: ACTIVE | Noted: 2020-01-01

## 2020-07-26 PROBLEM — R79.89 ELEVATED LACTIC ACID LEVEL: Status: ACTIVE | Noted: 2020-01-01

## 2020-08-01 NOTE — THERAPY TREATMENT NOTE
Acute Care - Physical Therapy Treatment Note  [facility]     Patient Name: Yeni Jimenez  : 1932  MRN: [id number]  Today's Date: 2020             Admit Date: 2020    Visit Dx:    ICD-10-CM ICD-9-CM   1. Community acquired pneumonia of right upper lobe of lung J18.9 486   2. Elevated lactic acid level R79.89 276.2   3. Dementia without behavioral disturbance, unspecified dementia type (CMS/HCC) F03.90 294.20     Patient Active Problem List   Diagnosis   • Hypokalemia   • Rheumatoid arthritis (CMS/HCC)   • Essential hypertension   • Hypocalcemia   • Generalized weakness   • Hypomagnesemia   • HCAP (healthcare-associated pneumonia)   • Hypophosphatemia   • Sepsis due to pneumonia (CMS/HCC)   • Pneumonia due to gram-negative bacteria (CMS/HCC)   • Altered mental status   • Acute pain of left knee   • Hypothyroidism (acquired)   • Osteoarthritis   • Chronic back pain   • Anxiety disorder   • Community acquired pneumonia of right upper lobe of lung   • Rheumatoid arthritis involving multiple sites (CMS/HCC)   • Polypharmacy   • Elevated lactic acid level   • 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HCC)   • Acute respiratory failure with hypoxia (CMS/HCC)       Therapy Treatment    Rehabilitation Treatment Summary     Row Name 20 1300             Treatment Time/Intention    Discipline  physical therapy assistant  -RAHEEL      Document Type  therapy note (daily note)  -RAHEEL      Subjective Information  complains of;weakness;fatigue  -RAHEEL      Mode of Treatment  individual therapy;physical therapy  -      Comment  has caregiver in room  -      Existing Precautions/Restrictions  fall;oxygen therapy device and L/min  -RAHEEL      Recorded by [RAHEEL] Beverly Estrada PTA 20 1324      Row Name 20 1300             Bed Mobility Assessment/Treatment    Supine-Sit [place] (Bed Mobility)  maximum assist (25% patient effort);verbal cues;nonverbal cues (demo/gesture);2 person assist  -RAHEEL      Bed Mobility, Safety  Issues  decreased use of arms for pushing/pulling;decreased use of legs for bridging/pushing;impaired trunk control for bed mobility  -      Assistive Device (Bed Mobility)  bed rails;draw sheet  -      Comment (Bed Mobility)  several rests for confused conversation  -      Recorded by [] Beverly Estrada, PHONG 08/01/20 1324      Row Name 08/01/20 1300             Sit-Stand Transfer    Sit-Stand [place] (Transfers)  2 person assist;maximum assist (25% patient effort)  -      Assistive Device (Sit-Stand Transfers)  -- HHA-2  -      Recorded by [] Beverly Esrtada PTA 08/01/20 1324      Row Name 08/01/20 1300             Gait/Stairs Assessment/Training    [place] Level (Gait)  2 person assist;maximum assist (25% patient effort);dependent (less than 25% patient effort)  -      Comment (Gait/Stairs)  3 shuffle steps to chair, knees flexed, diff moving feet  -      Recorded by [] Beverly Estrada PTA 08/01/20 1324      Row Name 08/01/20 1300             Pain Scale: FACES Pre/Post-Treatment    Pre/Post Treatment Pain Comment  did not c/o pain this visit  -      Recorded by [] Beverly Estrada, hospitals 08/01/20 1324        User Key  (r) = Recorded By, (t) = Taken By, (c) = Cosigned By    Initials Name Effective Dates Discipline     Beverly Estrada, hospitals 03/07/18 -  PT                   Physical Therapy Education                 Title: PT OT SLP Therapies (In Progress)     Topic: Physical Therapy (In Progress)     Point: Mobility training (In Progress)     Description:   Instruct learner(s) on safety and technique for assisting patient out of bed, chair or wheelchair.  Instruct in the proper use of assistive devices, such as walker, crutches, cane or brace.              Patient Friendly Description:   It's important to get you on your feet again, but we need to do so in a way that is safe for you. Falling has serious consequences, and your personal safety is the most important thing of all.         When it's time to get out of bed, one of us or a family member will sit next to you on the bed to give you support.     If your doctor or nurse tells you to use a walker, crutches, a cane, or a brace, be sure you use it every time you get out of bed, even if you think you don't need it.    Learning Progress Summary           Patient Acceptance, E,D, NR,NL by RAHEEL at 8/1/2020 1327    Acceptance, E,D, NR by RAHEEL at 7/30/2020 1737    Acceptance, E,TB,D, VU,NR by  at 7/27/2020 1237   Family Acceptance, E,D, NR by RAHEEL at 7/30/2020 1737    Acceptance, E,TB, VU by STEPHANIE at 7/28/2020 1904   Caregiver Acceptance, E,D, NR,NL by RAHEEL at 8/1/2020 1327    Acceptance, E,D, NR by RAHEEL at 7/30/2020 1737                   Point: Home exercise program (In Progress)     Description:   Instruct learner(s) on appropriate technique for monitoring, assisting and/or progressing patient with therapeutic exercises and activities.              Learning Progress Summary           Patient Acceptance, E,D, NR,NL by RAHEEL at 8/1/2020 1327    Acceptance, E,D, NR by RAHEEL at 7/30/2020 1737    Acceptance, E,TB,D, VU,NR by  at 7/27/2020 1237   Family Acceptance, E,D, NR by RAHEEL at 7/30/2020 1737    Acceptance, E,TB, VU by STEPHANIE at 7/28/2020 1904   Caregiver Acceptance, E,D, NR,NL by RAHEEL at 8/1/2020 1327    Acceptance, E,D, NR by  at 7/30/2020 1737                   Point: Body mechanics (In Progress)     Description:   Instruct learner(s) on proper positioning and spine alignment for patient and/or caregiver during mobility tasks and/or exercises.              Learning Progress Summary           Patient Acceptance, E,D, NR,NL by RAHEEL at 8/1/2020 1327    Acceptance, E,D, NR by RAHEEL at 7/30/2020 1737    Acceptance, E,TB,D, VU,NR by  at 7/27/2020 1237   Family Acceptance, E,D, NR by RAHEEL at 7/30/2020 1737    Acceptance, E,TB, VU by STEPHANIE at 7/28/2020 1904   Caregiver Acceptance, E,D, NR,NL by RAHEEL at 8/1/2020 1327    Acceptance, E,D, NR by RAHEEL at 7/30/2020 1735                    Point: Precautions (In Progress)     Description:   Instruct learner(s) on prescribed precautions during mobility and gait tasks              Learning Progress Summary           Patient Acceptance, E,D, NR,NL by  at 8/1/2020 1327    Acceptance, E,D, NR by  at 7/30/2020 1737    Acceptance, E,TB,D, VU,NR by  at 7/27/2020 1237   Family Acceptance, E,D, NR by  at 7/30/2020 1737    Acceptance, E,TB, VU by  at 7/28/2020 1904   Caregiver Acceptance, E,D, NR,NL by  at 8/1/2020 1327    Acceptance, E,D, NR by  at 7/30/2020 1737                               User Key     Initials Effective Dates Name Provider Type Discipline     04/03/18 -  Cecily Camp, PT Physical Therapist PT     03/07/18 -  Beverly Estrada PTA Physical Therapy Assistant PT    STEPHANIE 05/21/20 -  Ary Michaud, RN Registered Nurse Nurse                PT Recommendation and Plan     Plan of Care Reviewed With: patient, caregiver  Outcome Summary: pt w/incr confusion, thinks she is at home and  is having affair w/her caregiver, did not want to let caregiver assist initially; then agreed to get in chair, but in middle of tfer forgets what she is doing; plans 24hr care at home at [place]  Outcome Measures     Row Name 08/01/20 1300 07/30/20 1700          How much help from another person do you currently need...    Turning from your back to your side while in flat bed without using bedrails?  2  -JM  2  -JM     Moving from lying on back to sitting on the side of a flat bed without bedrails?  2  -JM  2  -JM     Moving to and from a bed to a chair (including a wheelchair)?  2  -JM  2  -JM     Standing up from a chair using your arms (e.g., wheelchair, bedside chair)?  2  -JM  2  -JM     Climbing 3-5 steps with a railing?  1  -JM  1  -JM     To walk in hospital room?  1  -JM  1  -JM     AM-PAC 6 Clicks Score (PT)  10  -JM  10  -JM       User Key  (r) = Recorded By, (t) = Taken By, (c) = Cosigned By    Initials Name Provider Type     Beverly Menchaca PTA Physical Therapy Assistant         Time Calculation:   PT Charges     Row Name 08/01/20 1332             Time Calculation    Start Time  0919  -RAHEEL      Stop Time  1015  -RAHEEL      Time Calculation (min)  56 min  -RAHEEL      PT Received On  08/01/20  -RAHEEL      PT - Next Appointment  08/03/20  -RAHEEL        User Key  (r) = Recorded By, (t) = Taken By, (c) = Cosigned By    Initials Name Provider Type    Beverly Menchaca PTA Physical Therapy Assistant        Therapy Charges for Today     Code Description Service Date Service Provider Modifiers Qty    26328472153 HC PT THER PROC EA 15 MIN 8/1/2020 Beverly Estrada PTA GP 4    14184549553 HC PT THER SUPP EA 15 MIN 8/1/2020 Beverly Estrada PTA GP 2          PT G-Codes  Outcome Measure Options: AM-PAC 6 Clicks Basic Mobility (PT)  AM-PAC 6 Clicks Score (PT): 10    Beverly Estrada PTA  8/1/2020

## 2020-08-01 NOTE — PROGRESS NOTES
"  Daily Progress Note.   [address]  8/1/2020    Patient:  Name:  Yeni Jimenez  MRN:  [id number]  11/6/1932  87 y.o.  female         CC: Short of breath    Interval History:  Patient is confused really unable to give much reliable history however her  who is at bedside reports that she is breathing little bit easier oxygen requirements are stable.  No worsening cough.  ROS: No fever, no diarrhea, no chest pain  PMFSSH: no change    Physical Exam:  /90 (BP Location: Left arm, Patient Position: Sitting)   Pulse 104   Temp 98.9 °F (37.2 °C) (Oral)   Resp 20   Ht 154.9 cm (61\")   Wt 62 kg (136 lb 11.2 oz)   SpO2 94%   BMI 25.83 kg/m²   Body mass index is 25.83 kg/m².  No intake or output data in the 24 hours ending 08/01/20 1321  General appearance: Confused but alert, conversant   Eyes: anicteric sclerae, moist conjunctivae; no lid-lag; PERRLA  HENT: Atraumatic; oropharynx clear with moist mucous membranes and no mucosal ulcerations; normal hard and soft palate  Neck: Trachea midline; FROM, supple, no thyromegaly or lymphadenopathy  Lungs: Bilateral expiratory wheeze diffusely, with normal respiratory effort and no intercostal retractions  CV: RRR, no rub  Abdomen: Soft, non-tender; no masses or HSM  Extremities: Trace bilateral peripheral edema or extremity lymphadenopathy  Skin: Normal temperature, turgor and texture; no rash, ulcers or subcutaneous nodules  Psych: Confused affect, alert and oriented to person but not to place or time    Data Review:  Notable Labs:  Results from last 7 days   Lab Units 08/01/20  0358 07/31/20  0616 07/27/20  0653 07/26/20  0955   WBC 10*3/mm3 13.93* 8.53 18.67* 18.89*   HEMOGLOBIN g/dL 11.9* 11.6* 10.7* 11.1*   PLATELETS 10*3/mm3 239 194 166 162     Results from last 7 days   Lab Units 08/01/20  0358 07/31/20  0616 07/28/20  1102 07/28/20  0709 07/27/20  0653 07/26/20  0955   SODIUM mmol/L 141 142  --   --  142 143   POTASSIUM mmol/L 3.8 " 2.7*  --   --  3.5 3.8   CHLORIDE mmol/L 102 100  --   --  110* 110*   CO2 mmol/L 27.4 27.2  --   --  23.8 23.9   BUN mg/dL 20 11  --   --  13 20   CREATININE mg/dL 0.69 0.58 0.66 0.49* 0.50* 0.81   GLUCOSE mg/dL 106* 154*  --   --  123* 83   CALCIUM mg/dL 10.2 9.3  --   --  8.9 8.5*   MAGNESIUM mg/dL  --  1.9  --   --  2.1 1.9   PHOSPHORUS mg/dL  --  2.9  --   --  2.2*  --    Estimated Creatinine Clearance: 41.8 mL/min (by C-G formula based on SCr of 0.69 mg/dL).    Results from last 7 days   Lab Units 08/01/20  0358 07/31/20  0616 07/27/20  0653 07/26/20  1641  07/25/20  1712   AST (SGOT) U/L  --   --  18  --   --   --    ALT (SGPT) U/L  --   --  13  --   --   --    LACTATE mmol/L  --   --  1.2 0.9  --  2.5*   PLATELETS 10*3/mm3 239 194 166  --    < >  --     < > = values in this interval not displayed.       Results from last 7 days   Lab Units 07/25/20  1713   PH, ARTERIAL pH units 7.471*   PCO2, ARTERIAL mm Hg 36.0   PO2 ART mm Hg 63.5*   HCO3 ART mmol/L 26.3       Imaging:  Reviewed chest images personally from past 3 days    Scheduled meds:      amoxicillin-clavulanate 1 tablet Oral Q12H   aspirin 81 mg Oral Daily   dilTIAZem  mg Oral Q24H   doxycycline 100 mg Oral Q12H   furosemide 20 mg Oral Daily   ipratropium-albuterol 3 mL Nebulization Q4H While Awake - RT   levothyroxine 112 mcg Oral Daily   losartan 100 mg Oral Q24H   multivitamin with minerals 1 tablet Oral Daily   OLANZapine 5 mg Intramuscular Once   OLANZapine 10 mg Oral Daily Before Supper   pantoprazole 40 mg Oral QAM   polyethylene glycol 17 g Oral Daily   potassium chloride 20 mEq Oral Daily   predniSONE 5 mg Oral Daily With Breakfast   senna 2 tablet Oral BID   traZODone 100 mg Oral Nightly       ASSESSMENT  /  PLAN:  1. Acute respiratory failure, hypoxia  2. Bilateral pulmonary infiltrates, R>L  3. Dysphagia with pills only previously  4. Acute systolic and diastolic CHF  5. Acute lactic acidosis  6. Hx RA  7. Chronic prednisone 5  mg  8. Asthma exacerbation  9. Diuretic induced hypokalemia     PLAN:  Patient is wheezing diffusely will increase steroids to 20 mg daily given her advanced age would not increase more than this  Can slowly taper down to her home 5 mg daily  Plan to complete 7 days of antibiotic with oral Augmentin to finish tomorrow  Oxygen requirements are improving  Continue with a dysphagia diet  Fluid optimization  Discussed with the patient's  at bedside retired surgeon.  Coordinated care with Dr Rivero today.  All issues new to me today.  Prior hospital course, labs and imaging reviewed.      Rodrigo Gonzalez MD  [facility]  08/01/20  1:21 PM

## 2020-08-01 NOTE — PLAN OF CARE
Problem: Patient Care Overview  Goal: Plan of Care Review  Outcome: Ongoing (interventions implemented as appropriate)  Flowsheets (Taken 8/1/2020 3913)  Progress: improving  Plan of Care Reviewed With: patient  Outcome Summary: Patient confused and agitated at times. Difficult to have patient take her medications. No complaints of pain, nausea or SOA. Patient worked with PT and was able to stand and move to the chair with assost x2. Patient treated for anxiety. Patient has been hypertensive today and tachycardic at times. Will continue to monitor and assist patient as needed.  Goal: Individualization and Mutuality  Outcome: Ongoing (interventions implemented as appropriate)  Goal: Discharge Needs Assessment  Outcome: Ongoing (interventions implemented as appropriate)  Goal: Interprofessional Rounds/Family Conf  Outcome: Ongoing (interventions implemented as appropriate)     Problem: Fall Risk (Adult)  Goal: Absence of Fall  Outcome: Ongoing (interventions implemented as appropriate)     Problem: Skin Injury Risk (Adult)  Goal: Skin Health and Integrity  Outcome: Ongoing (interventions implemented as appropriate)     Problem: Pain, Chronic (Adult)  Goal: Acceptable Pain/Comfort Level and Functional Ability  Outcome: Ongoing (interventions implemented as appropriate)     Problem: Pneumonia (Adult)  Goal: Signs and Symptoms of Listed Potential Problems Will be Absent, Minimized or Managed (Pneumonia)  Outcome: Ongoing (interventions implemented as appropriate)

## 2020-08-01 NOTE — PLAN OF CARE
Patient resting abed tonight with caregiver attentive and at her side through the night. Patient maintaining good O2 saturations on 2 ltrs. Humidified NC of ~94%. Intermittent, productive cough with clear sputum, and mild congestion noted. She still has confusion, with bouts of lucidity about long term memories. Abdomen is soft with + bowel sounds in all quadrants, she continues to void without difficulty and with external catheter in place. Patient is incontinent of bowel and bladder, with perineal skin clean, dry, and intact without s/s of breakdown. No edema noted and pulses are easily palpable. Nursing is turning and repositioning patient every two hours and as needed.

## 2020-08-01 NOTE — PROGRESS NOTES
" LOS: 7 days     Name: Yeni Jimenez  Age: 87 y.o.  Sex: female  :  1932  MRN: [id number]         Primary Care Physician: Arian Moyer MD    Subjective   Subjective  Had some heightened anxiety this morning but appears more calm now.  Remains confused and really only oriented to person.    Objective   Vital Signs  Temp:  [97.6 °F (36.4 °C)-98.9 °F (37.2 °C)] 98.9 °F (37.2 °C)  Heart Rate:  [] 104  Resp:  [18-22] 20  BP: (130-160)/(67-97) 160/90  Body mass index is 25.83 kg/m².    Objective:  General Appearance:  Comfortable, in no acute distress and ill-appearing (Acutely and chronically ill-appearing).    Vital signs: (most recent): Blood pressure 160/90, pulse 104, temperature 98.9 °F (37.2 °C), temperature source Oral, resp. rate 20, height 154.9 cm (61\"), weight 62 kg (136 lb 11.2 oz), SpO2 94 %.    Lungs:  Normal effort and normal respiratory rate.  She is not in respiratory distress.  There are decreased breath sounds.    Heart: Normal rate.  Regular rhythm.    Abdomen: Abdomen is soft.  Bowel sounds are normal.   There is no abdominal tenderness.     Extremities: There is no dependent edema or local swelling.    Neurological: (Awake, alert, anxious but relatively calm at the moment.  Confused and only oriented to person).    Skin:  Warm and dry.              Results Review:       I reviewed the patient's new clinical results.    Results from last 7 days   Lab Units 20  03520  0616 20  0653 20  0955   WBC 10*3/mm3 13.93* 8.53 18.67* 18.89*   HEMOGLOBIN g/dL 11.9* 11.6* 10.7* 11.1*   PLATELETS 10*3/mm3 239 194 166 162     Results from last 7 days   Lab Units 20  0358 20  0616 20  1102 20  0709 20  0653 20  0955   SODIUM mmol/L 141 142  --   --  142 143   POTASSIUM mmol/L 3.8 2.7*  --   --  3.5 3.8   CHLORIDE mmol/L 102 100  --   --  110* 110*   CO2 mmol/L 27.4 27.2  --   --  23.8 23.9   BUN mg/dL 20   --   --   "   CREATININE mg/dL 0.69 0.58 0.66 0.49* 0.50* 0.81   CALCIUM mg/dL 10.2 9.3  --   --  8.9 8.5*   GLUCOSE mg/dL 106* 154*  --   --  123* 83                 Scheduled Meds:     amoxicillin-clavulanate 1 tablet Oral Q12H   aspirin 81 mg Oral Daily   dilTIAZem  mg Oral Q24H   doxycycline 100 mg Oral Q12H   furosemide 20 mg Oral Daily   ipratropium-albuterol 3 mL Nebulization Q4H While Awake - RT   levothyroxine 112 mcg Oral Daily   losartan 100 mg Oral Q24H   multivitamin with minerals 1 tablet Oral Daily   OLANZapine 5 mg Intramuscular Once   OLANZapine 10 mg Oral Daily Before Supper   pantoprazole 40 mg Oral QAM   polyethylene glycol 17 g Oral Daily   potassium chloride 20 mEq Oral Daily   predniSONE 5 mg Oral Daily With Breakfast   senna 2 tablet Oral BID   traZODone 100 mg Oral Nightly     PRN Meds:   •  acetaminophen-codeine  •  ALPRAZolam  •  OLANZapine  •  potassium chloride **OR** potassium chloride **OR** potassium chloride  Continuous Infusions:       Assessment/Plan   Active Hospital Problems    Diagnosis  POA   • **Acute respiratory failure with hypoxia (CMS/HCC) [J96.01]  Yes   • Rheumatoid arthritis involving multiple sites (CMS/[facility]) [M06.9]  Yes   • Polypharmacy [Z79.899]  Not Applicable   • Elevated lactic acid level [R79.89]  Yes   • 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       Assessment & Plan    -Currently on Augmentin and doxycycline to complete a 7-day course for treatment of right upper lobe pneumonia.  White count noted to be up today and will repeat in the morning.  Wean oxygen as tolerated.  May require this upon discharge.  -Acute respiratory failure improving and she is now down to 2 L nasal cannula.  Continue pulmonary hygiene.     In discussion with her  he is elected not to try to pursue additional swallow evaluation.  -Cardiology feels that he is stable and has signed off with plans to continue current cardiac medications and will see her in the office in a couple weeks  -Psychiatry has evaluated and recommend continuation of current medications with addition of as needed Zyprexa for agitation or delirium.  -Therapy services  -Flutter valve and incentive spirometer  -Discussed with her  at the bedside who is a retired general surgeon regarding.  We will see how she does with therapy moving forward and trajectory of improvement and he will continue to consider options regarding eventual discharge to rehab versus home with home health and there 24-hour caregivers.  At this time he is leaning towards taking her home with caregivers particularly given the pandemic.  -CCP following      I wore full protective equipment throughout the patient encounter including eye protection and facemask.  Hand hygiene was performed before donning protective equipment and after removal when leaving the room.    Garett Rivero MD  [facility]  08/01/20  1:24 PM

## 2020-08-01 NOTE — PLAN OF CARE
Problem: Patient Care Overview  Goal: Plan of Care Review  Outcome: Ongoing (interventions implemented as appropriate)  Flowsheets (Taken 8/1/2020 5857)  Plan of Care Reviewed With: patient; caregiver  Outcome Summary: pt w/incr confusion, thinks she is at home and  is having affair w/her caregiver, did not want to let caregiver assist initially; then agreed to get in chair, but in middle of tfer forgets what she is doing; plans 24hr care at home at [place]

## 2020-08-02 PROBLEM — J45.901 ASTHMA EXACERBATION: Status: ACTIVE | Noted: 2020-01-01

## 2020-08-02 NOTE — PROGRESS NOTES
Name: Yeni Jimenez ADMIT: 2020   : 1932  PCP: Arian Moyer MD    MRN: [id number] LOS: 8 days   AGE/SEX: 87 y.o. female  ROOM: [facility]     Subjective   Subjective   CC: dyspnea  No acute events. Patient is unable to give coherent history due to dementia and increased confusion. She has not slept much in the past 2d but is doing better with this.     Objective   Objective   Vital Signs  Temp:  [98.5 °F (36.9 °C)-99 °F (37.2 °C)] 98.5 °F (36.9 °C)  Heart Rate:  [] 80  Resp:  [18-20] 18  BP: (137-190)/() 146/70  SpO2:  [93 %-95 %] 94 %  on  Flow (L/min):  [2] 2;   Device (Oxygen Therapy): nasal cannula  Body mass index is 24.6 kg/m².  Physical Exam   Constitutional: No distress.   HENT:   Head: Normocephalic and atraumatic.   Mouth/Throat: Oropharynx is clear and moist.   Eyes: Pupils are equal, round, and reactive to light. Conjunctivae and EOM are normal.   Neck: Normal range of motion. Neck supple. No JVD present.   Cardiovascular: Normal rate, regular rhythm and intact distal pulses.   Pulmonary/Chest: Effort normal. She has wheezes (bilateral, scattered).   Abdominal: Soft. Bowel sounds are normal.   Musculoskeletal: She exhibits no edema or tenderness.   Neurological: She is alert. No cranial nerve deficit. She exhibits normal muscle tone.   Skin: Skin is warm and dry. Capillary refill takes less than 2 seconds. She is not diaphoretic.   Psychiatric: Her speech is normal. Cognition and memory are impaired.   Nursing note and vitals reviewed.    Results Review:       I reviewed the patient's new clinical results.  I reviewed the patient's telemetry.  Results from last 7 days   Lab Units 20  0711 20  0358 20  0616 20  0653   WBC 10*3/mm3 10.43 13.93* 8.53 18.67*   HEMOGLOBIN g/dL 10.4* 11.9* 11.6* 10.7*   PLATELETS 10*3/mm3 218 239 194 166     Results from last 7 days   Lab Units 20  0711 20  0358 20  0616 20  1102   07/27/20  0653   SODIUM mmol/L 143 141 142  --   --  142   POTASSIUM mmol/L 3.5 3.8 2.7*  --   --  3.5   CHLORIDE mmol/L 105 102 100  --   --  110*   CO2 mmol/L 30.3* 27.4 27.2  --   --  23.8   BUN mg/dL 19 20 11  --   --  13   CREATININE mg/dL 0.70 0.69 0.58 0.66   < > 0.50*   GLUCOSE mg/dL 96 106* 154*  --   --  123*    < > = values in this interval not displayed.   Estimated Creatinine Clearance: 40.9 mL/min (by C-G formula based on SCr of 0.7 mg/dL).  Results from last 7 days   Lab Units 07/31/20  0616 07/27/20  0653   ALBUMIN g/dL 3.10* 2.80*   BILIRUBIN mg/dL  --  0.5   ALK PHOS U/L  --  38*   AST (SGOT) U/L  --  18   ALT (SGPT) U/L  --  13     Results from last 7 days   Lab Units 08/02/20  0711 08/01/20  0358 07/31/20  0616 07/27/20  0653   CALCIUM mg/dL 9.5 10.2 9.3 8.9   ALBUMIN g/dL  --   --  3.10* 2.80*   MAGNESIUM mg/dL  --   --  1.9 2.1   PHOSPHORUS mg/dL  --   --  2.9 2.2*     Results from last 7 days   Lab Units 07/27/20  0653   LACTATE mmol/L 1.2         No results found for: HGBA1C, POCGLU    Adult Transthoracic Echo Complete W/ Cont if Necessary Per Protocol  · Left ventricular systolic function is moderately decreased. Calculated   EF = 37.0%. Estimated EF was in disagreement with the calculated EF.   Estimated EF appears to be in the range of 41 - 45%. The estimated   ejection fraction was higher than the calculated ejection fraction.   Definity was indicated however patient refused..  · Normal left ventricular cavity size noted. Wall motion abnormalities are   noted below. Left ventricular wall thickness is consistent with   mild-to-moderate concentric hypertrophy. Septal wall motion is abnormal.  · Mild aortic valve regurgitation is present.  · Mild MAC is present. There is mild bileaflet mitral valve thickening   present. Mild-to-moderate mitral valve regurgitation is present. No   significant mitral valve stenosis is present.  · Mild to moderate tricuspid valve regurgitation is present.  Estimated   right ventricular systolic pressure from tricuspid regurgitation is mildly   elevated (35-45 mmHg). Calculated right ventricular systolic pressure from   tricuspid regurgitation is 41 mmHg.           amoxicillin-clavulanate 1 tablet Oral Q12H   aspirin 81 mg Oral Daily   dilTIAZem  mg Oral Q24H   doxycycline 100 mg Oral Q12H   furosemide 20 mg Oral Daily   ipratropium-albuterol 3 mL Nebulization Q4H While Awake - RT   levothyroxine 112 mcg Oral Daily   losartan 100 mg Oral Q24H   multivitamin with minerals 1 tablet Oral Daily   OLANZapine 5 mg Intramuscular Once   OLANZapine 10 mg Oral Daily Before Supper   pantoprazole 40 mg Oral QAM   polyethylene glycol 17 g Oral Daily   potassium chloride 20 mEq Oral Daily   predniSONE 20 mg Oral Daily With Breakfast   senna 2 tablet Oral BID   traZODone 100 mg Oral Nightly      Diet Dysphagia; IV - Mechanical Soft No Mixed Consistencies; Nectar / Syrup Thick       Assessment/Plan     Active Hospital Problems    Diagnosis  POA   • **Acute respiratory failure with hypoxia (CMS/HCC) [J96.01]  Yes   • Asthma exacerbation [J45.901]  Yes   • Rheumatoid arthritis involving multiple sites (CMS/HCC) [M06.9]  Yes   • Polypharmacy [Z79.899]  Not Applicable   • Elevated lactic acid level [R79.89]  Yes   • 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   Pneumonia  - has bilateral infiltrates, worse on the right  - continue augmentin to complete 7d course   - pulmonary toilet    Asthma Exacerbation  - continue on prednisone low dose (given age) and nebulized bronchodilators  - on 5mg prednisone daily at home  - appreciate pulmonology recs-I d/w Dr. Rodrigo Gonzalez    Hypothyroidism  - continue on levothyroxine    Multifocal  Atrial Tachycardia  - now resolved  - on diltiazem  - appreciate cardiology recs    Chronic Systolic CHF  - volume status is stable   - on her home dose of lasix 20mg daily    SCDs for DVT prophylaxis.  Full code.  Discussed with patient, family and nursing staff.  Anticipate discharge TBD.  in 2-3 days.      Karri Simon MD  [facility]  08/02/20  18:53    Patient was wearing facemask when I entered the room and throughout our encounter.  I wore protective equipment throughout this patient encounter including a face mask, gloves and protective eyewear.  Hand hygiene was performed before donning protective equipment and after removal when leaving the room.

## 2020-08-02 NOTE — PROGRESS NOTES
"  Daily Progress Note.   [address]  8/2/2020    Patient:  Name:  Yeni Jimenez  MRN:  [id number]  11/6/1932  87 y.o.  female         CC: Short of breath    Interval History:  Follow up ae of copd, pna  Ill appearing, asleep but will awaken, asks for her , states she wishes to see him.  Otherwise not able to get much history    ROS: unable to obtain due to alerted mental status  PMFSSH: no change    Physical Exam:  /68 (BP Location: Left arm, Patient Position: Lying)   Pulse 74   Temp 98.9 °F (37.2 °C) (Temporal)   Resp 18   Ht 154.9 cm (61\")   Wt 59.1 kg (130 lb 3.2 oz)   SpO2 93%   BMI 24.60 kg/m²   Body mass index is 24.6 kg/m².    Intake/Output Summary (Last 24 hours) at 8/2/2020 1117  Last data filed at 8/2/2020 0135  Gross per 24 hour   Intake 360 ml   Output 1300 ml   Net -940 ml     General appearance: Confused but alert, conversant   Eyes: anicteric sclerae, moist conjunctivae; no lid-lag; PERRLA  HENT: Atraumatic; oropharynx clear with moist mucous membranes and no mucosal ulcerations; normal hard and soft palate  Neck: Trachea midline; FROM, supple, no thyromegaly or lymphadenopathy  Lungs: Bilateral expiratory wheeze much improved compared to yesterday, with normal respiratory effort and no intercostal retractions  CV: RRR, no rub  Abdomen: Soft, non-tender; no masses or HSM  Extremities: Trace bilateral peripheral edema or extremity lymphadenopathy  Skin: Normal temperature, turgor and texture; no rash, ulcers or subcutaneous nodules  Psych: Confused affect, alert and oriented to person    Data Review:  Notable Labs:  Results from last 7 days   Lab Units 08/02/20  0711 08/01/20 0358 07/31/20  0616 07/27/20  0653   WBC 10*3/mm3 10.43 13.93* 8.53 18.67*   HEMOGLOBIN g/dL 10.4* 11.9* 11.6* 10.7*   PLATELETS 10*3/mm3 218 239 194 166     Results from last 7 days   Lab Units 08/02/20  0711 08/01/20 0358 07/31/20  0616 07/28/20  1102 07/28/20  0709 07/27/20  0653   "   SODIUM mmol/L 143 141 142  --   --  142   POTASSIUM mmol/L 3.5 3.8 2.7*  --   --  3.5   CHLORIDE mmol/L 105 102 100  --   --  110*   CO2 mmol/L 30.3* 27.4 27.2  --   --  23.8   BUN mg/dL 19 20 11  --   --  13   CREATININE mg/dL 0.70 0.69 0.58 0.66 0.49* 0.50*   GLUCOSE mg/dL 96 106* 154*  --   --  123*   CALCIUM mg/dL 9.5 10.2 9.3  --   --  8.9   MAGNESIUM mg/dL  --   --  1.9  --   --  2.1   PHOSPHORUS mg/dL  --   --  2.9  --   --  2.2*   Estimated Creatinine Clearance: 40.9 mL/min (by C-G formula based on SCr of 0.7 mg/dL).    Results from last 7 days   Lab Units 08/02/20  0711 08/01/20  0358 07/31/20  0616 07/27/20  0653  07/26/20  1641   AST (SGOT) U/L  --   --   --  18  --   --    ALT (SGPT) U/L  --   --   --  13  --   --    LACTATE mmol/L  --   --   --  1.2  --  0.9   PLATELETS 10*3/mm3 218 239 194 166   < >  --     < > = values in this interval not displayed.             Imaging:  Reviewed chest images personally from past 3 days    Scheduled meds:      amoxicillin-clavulanate 1 tablet Oral Q12H   aspirin 81 mg Oral Daily   dilTIAZem  mg Oral Q24H   doxycycline 100 mg Oral Q12H   furosemide 20 mg Oral Daily   ipratropium-albuterol 3 mL Nebulization Q4H While Awake - RT   levothyroxine 112 mcg Oral Daily   losartan 100 mg Oral Q24H   multivitamin with minerals 1 tablet Oral Daily   OLANZapine 5 mg Intramuscular Once   OLANZapine 10 mg Oral Daily Before Supper   pantoprazole 40 mg Oral QAM   polyethylene glycol 17 g Oral Daily   potassium chloride 20 mEq Oral Daily   predniSONE 20 mg Oral Daily With Breakfast   senna 2 tablet Oral BID   traZODone 100 mg Oral Nightly       ASSESSMENT  /  PLAN:  1. Acute respiratory failure, hypoxia  2. Bilateral pulmonary infiltrates, R>L  3. Dysphagia with pills only previously  4. Acute systolic and diastolic CHF  5. Acute lactic acidosis  6. Hx RA  7. Chronic prednisone 5 mg  8. Asthma exacerbation  9. Diuretic induced hypokalemia     PLAN:  Cont steroids 20 mg daily  given her advanced age would not increase more than this as she has difficulty with insomnia and confusion already  Can slowly taper down to her home 10mg daily  Plan to complete 7 days of antibiotic with oral Augmentin to finish   Oxygen requirements are improving  Continue with a dysphagia diet  Fluid optimization  Discussed with the patient's  at bedside retired surgeon.  Coordinated care with Dr Simon today.    Rodrigo Gonzalez MD  [facility]  08/02/20  8537

## 2020-08-02 NOTE — PLAN OF CARE
Problem: Patient Care Overview  Goal: Plan of Care Review  Outcome: Ongoing (interventions implemented as appropriate)  Flowsheets (Taken 8/2/2020 1510)  Progress: improving  Plan of Care Reviewed With: patient  Outcome Summary: Patient has been confused but less agitated today. Still talks about going home. Patients  and sitter at bedside and they help reorient her. Patient treated once for pain. No nausea. Patient O2 sat drops when asleep. Patient sometimes placed on 2LO2. Patient up to chair and bedside commode. Had large BM today. Will continue to monitor and assist patient as needed.  Goal: Individualization and Mutuality  Outcome: Ongoing (interventions implemented as appropriate)  Goal: Discharge Needs Assessment  Outcome: Ongoing (interventions implemented as appropriate)  Goal: Interprofessional Rounds/Family Conf  Outcome: Ongoing (interventions implemented as appropriate)     Problem: Fall Risk (Adult)  Goal: Absence of Fall  Outcome: Ongoing (interventions implemented as appropriate)     Problem: Skin Injury Risk (Adult)  Goal: Skin Health and Integrity  Outcome: Ongoing (interventions implemented as appropriate)     Problem: Pain, Chronic (Adult)  Goal: Acceptable Pain/Comfort Level and Functional Ability  Outcome: Ongoing (interventions implemented as appropriate)     Problem: Pneumonia (Adult)  Goal: Signs and Symptoms of Listed Potential Problems Will be Absent, Minimized or Managed (Pneumonia)  Outcome: Ongoing (interventions implemented as appropriate)

## 2020-08-02 NOTE — PLAN OF CARE
Patient more restful this shift after receiving partial bath and shampoo while abed. Less confusion and behaviors through the night and did take her medications without difficulty. She continues on x2 different PO antibiotics and is tolerating well without any adverse side effects noticed. Lungs are diminished throughout with bilateral rhonchi in the upper air way. No edema noted, pulses are all palpable, and pt. C/o neck and back pain x1 this shift and was administered her PO pain reliever.

## 2020-08-03 PROBLEM — J69.0 ASPIRATION PNEUMONIA OF RIGHT LUNG DUE TO VOMIT (HCC): Status: ACTIVE | Noted: 2020-01-01

## 2020-08-03 PROBLEM — I50.43 ACUTE ON CHRONIC COMBINED SYSTOLIC AND DIASTOLIC CHF (CONGESTIVE HEART FAILURE) (HCC): Status: ACTIVE | Noted: 2020-01-01

## 2020-08-03 NOTE — PROGRESS NOTES
"  Daily Progress Note.   [address]  8/3/2020    Patient:  Name:  Yeni Jimenez  MRN:  [id number]  11/6/1932  87 y.o.  female         CC: Short of breath    Interval History:  Follow up ae of copd, pna  Ill appearing but less confused and more alert today.  She repeatedly asks me what type of doctor I am, but is able to speak in sentences without stopping.  Discussed with patients caretaker and she says she is getting closer to her baseline mental status.    ROS: otherwise limited by altered mental status  PMFSSH: no change    Physical Exam:  /74 (BP Location: Left arm, Patient Position: Lying)   Pulse 84   Temp 97.7 °F (36.5 °C) (Oral)   Resp 18   Ht 154.9 cm (61\")   Wt 61.2 kg (134 lb 14.4 oz)   SpO2 97%   BMI 25.49 kg/m²   Body mass index is 25.49 kg/m².    Intake/Output Summary (Last 24 hours) at 8/3/2020 1104  Last data filed at 8/2/2020 2243  Gross per 24 hour   Intake 630 ml   Output --   Net 630 ml     General appearance: Confused but alert, conversant   Eyes: anicteric sclerae, moist conjunctivae; no lid-lag; PERRLA  HENT: Atraumatic; oropharynx clear with moist mucous membranes and no mucosal ulcerations; normal hard and soft palate  Neck: Trachea midline; FROM, supple, no thyromegaly or lymphadenopathy  Lungs: no expiratory wheeze, with normal respiratory effort and no intercostal retractions  CV: RRR, no rub  Abdomen: Soft, non-tender; no masses or HSM  Extremities: Trace bilateral peripheral edema or extremity lymphadenopathy  Skin: Normal temperature, turgor and texture; no rash, ulcers or subcutaneous nodules  Psych: Confused affect, alert and oriented to person    Data Review:  Notable Labs:  Results from last 7 days   Lab Units 08/02/20  0711 08/01/20  0358 07/31/20  0616   WBC 10*3/mm3 10.43 13.93* 8.53   HEMOGLOBIN g/dL 10.4* 11.9* 11.6*   PLATELETS 10*3/mm3 218 239 194     Results from last 7 days   Lab Units 08/02/20  0711 08/01/20  0358 07/31/20  0616 " 07/28/20  1102 07/28/20  0709   SODIUM mmol/L 143 141 142  --   --    POTASSIUM mmol/L 3.5 3.8 2.7*  --   --    CHLORIDE mmol/L 105 102 100  --   --    CO2 mmol/L 30.3* 27.4 27.2  --   --    BUN mg/dL 19 20 11  --   --    CREATININE mg/dL 0.70 0.69 0.58 0.66 0.49*   GLUCOSE mg/dL 96 106* 154*  --   --    CALCIUM mg/dL 9.5 10.2 9.3  --   --    MAGNESIUM mg/dL  --   --  1.9  --   --    PHOSPHORUS mg/dL  --   --  2.9  --   --    Estimated Creatinine Clearance: 41.6 mL/min (by C-G formula based on SCr of 0.7 mg/dL).    Results from last 7 days   Lab Units 08/02/20  0711 08/01/20  0358 07/31/20  0616   PLATELETS 10*3/mm3 218 239 194             Imaging:  Reviewed chest images personally from past 3 days    Scheduled meds:      aspirin 81 mg Oral Daily   dilTIAZem  mg Oral Q24H   doxycycline 100 mg Oral Q12H   furosemide 20 mg Oral Daily   ipratropium-albuterol 3 mL Nebulization Q4H While Awake - RT   levothyroxine 112 mcg Oral Daily   losartan 100 mg Oral Q24H   multivitamin with minerals 1 tablet Oral Daily   OLANZapine 5 mg Intramuscular Once   OLANZapine 10 mg Oral Daily Before Supper   pantoprazole 40 mg Oral QAM   polyethylene glycol 17 g Oral Daily   potassium chloride 20 mEq Oral Daily   predniSONE 20 mg Oral Daily With Breakfast   senna 2 tablet Oral BID   traZODone 100 mg Oral Nightly       ASSESSMENT  /  PLAN:  1. Acute respiratory failure, hypoxia  2. Bilateral pulmonary infiltrates, R>L  3. Dysphagia with pills only previously  4. Acute systolic and diastolic CHF  5. Acute lactic acidosis  6. Hx RA  7. Chronic prednisone 5 mg  8. Asthma exacerbation  9. Diuretic induced hypokalemia     PLAN:  Cont steroids 20 mg daily given her advanced age would not increase more than this as she has difficulty with insomnia and confusion already and seems to have therapeutic effect at present    Can slowly taper down to her home 10mg daily - chart lists as 5mg but  retired MD states he is pretty sure it is  10mg    Plan to complete 7 days of antibiotic with oral Augmentin to finish     Oxygen requirements are improving    Recheck cxr    Continue with a dysphagia diet    cxr recheck    Protective eyewear and mask wore during all patient interactions.    Rodrigo Gonzalez MD  [facility]  08/03/20  1204       tested positive for covid, patient test sent + as well  Stop nebulized therapies  Start MDI symbicort albuterol and spiriva  Stop pred  Start decadron 6 mg as wel now have COVID + hypoxia and diffuse bilateral infitlrates noted on last cxr.    D/w Dr Simon.          Rodrigo Gonzalez MD  [facility]  08/03/20  2:39 PM

## 2020-08-03 NOTE — PLAN OF CARE
Pt placed under Enhanced Precautions.  Meds given.  Caregivers can not come back.  VSS.  Will cont to monitor.    Problem: Patient Care Overview  Goal: Plan of Care Review  Outcome: Ongoing (interventions implemented as appropriate)  Goal: Individualization and Mutuality  Outcome: Ongoing (interventions implemented as appropriate)  Goal: Discharge Needs Assessment  Outcome: Ongoing (interventions implemented as appropriate)  Goal: Interprofessional Rounds/Family Conf  Outcome: Ongoing (interventions implemented as appropriate)     Problem: Fall Risk (Adult)  Goal: Absence of Fall  Outcome: Ongoing (interventions implemented as appropriate)     Problem: Skin Injury Risk (Adult)  Goal: Skin Health and Integrity  Outcome: Ongoing (interventions implemented as appropriate)     Problem: Pain, Chronic (Adult)  Goal: Acceptable Pain/Comfort Level and Functional Ability  Outcome: Ongoing (interventions implemented as appropriate)     Problem: Pneumonia (Adult)  Goal: Signs and Symptoms of Listed Potential Problems Will be Absent, Minimized or Managed (Pneumonia)  Outcome: Ongoing (interventions implemented as appropriate)

## 2020-08-03 NOTE — THERAPY TREATMENT NOTE
Patient Name: Yeni Jimenez  : 1932    MRN: [id number]                              Today's Date: 8/3/2020       Admit Date: 2020    Visit Dx:     ICD-10-CM ICD-9-CM   1. Community acquired pneumonia of right upper lobe of lung J18.9 486   2. Elevated lactic acid level R79.89 276.2   3. Dementia without behavioral disturbance, unspecified dementia type (CMS/HCC) F03.90 294.20     Patient Active Problem List   Diagnosis   • Hypokalemia   • Rheumatoid arthritis (CMS/HCC)   • Essential hypertension   • Hypocalcemia   • Generalized weakness   • Hypomagnesemia   • HCAP (healthcare-associated pneumonia)   • Hypophosphatemia   • Sepsis due to pneumonia (CMS/HCC)   • Pneumonia due to gram-negative bacteria (CMS/HCC)   • Altered mental status   • Acute pain of left knee   • Hypothyroidism (acquired)   • Osteoarthritis   • Chronic back pain   • Anxiety disorder   • Community acquired pneumonia of right upper lobe of lung   • Rheumatoid arthritis involving multiple sites (CMS/HCC)   • Polypharmacy   • Elevated lactic acid level   • 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HCC)   • Acute respiratory failure with hypoxia (CMS/HCC)   • Asthma exacerbation     Past Medical History:   Diagnosis Date   • Anxiety    • Arthritis    • Chronic back pain    • Disease of thyroid gland    • Hypertension    • Hypothyroidism    • Left bundle branch block    • Osteoarthritis    • Osteoporosis    • Palpitation    • Rheumatoid arthritis (CMS/HCC)    • 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HCC)      Past Surgical History:   Procedure Laterality Date   • COLON RESECTION WITH COLOSTOMY     • COLONOSCOPY     • COLOSTOMY CLOSURE     • SIGMOIDOSCOPY     • TONSILLECTOMY       General Information     Row Name 20 0948          PT Evaluation Time/Intention    Document Type  therapy note (daily note)  -PH     Mode of Treatment  physical therapy  -PH     Row Name 20 0948          Safety Issues, Functional Mobility    Impairments Affecting Function (Mobility)   balance;endurance/activity tolerance;strength;cognition;postural/trunk control;range of motion (ROM);pain  -PH       User Key  (r) = Recorded By, (t) = Taken By, (c) = Cosigned By    Initials Name Provider Type    PH Victor Manuelscotty KelsiPHONG renner Physical Therapy Assistant        Mobility     Row Name 08/03/20 0948          Bed Mobility Assessment/Treatment    Bed Mobility Assessment/Treatment  supine-sit  -PH     Supine-Sit [place] (Bed Mobility)  maximum assist (25% patient effort);verbal cues;moderate assist (50% patient effort);1 person assist;nonverbal cues (demo/gesture)  -PH     Assistive Device (Bed Mobility)  bed rails;head of bed elevated  -PH     Comment (Bed Mobility)  Pt sat at EOB; confused  -PH     Row Name 08/03/20 0948          Transfer Assessment/Treatment    Comment (Transfers)  Pt forward flexed and unable to correct w/ cues  -PH     Row Name 08/03/20 0948          Sit-Stand Transfer    Sit-Stand [place] (Transfers)  maximum assist (25% patient effort);dependent (less than 25% patient effort);2 person assist;verbal cues;nonverbal cues (demo/gesture)  -PH     Assistive Device (Sit-Stand Transfers)  other (see comments) HHA x 2  -PH     Row Name 08/03/20 0948          Gait/Stairs Assessment/Training    Gait/Stairs Assessment/Training  gait/ambulation independence  -PH     [place] Level (Gait)  maximum assist (25% patient effort);2 person assist;verbal cues;nonverbal cues (demo/gesture);dependent (less than 25% patient effort)  -PH     Assistive Device (Gait)  walker, front-wheeled  -PH     Distance in Feet (Gait)  4-5 steps to chair w/ pt req dep x 2 when unable to seq backward to EOC  -PH     Deviations/Abnormal Patterns (Gait)  festinating/shuffling;base of support, narrow;stride length decreased;gait speed decreased  -PH     Bilateral Gait Deviations  forward flexed posture;heel strike decreased;weight shift ability decreased  -PH     Comment (Gait/Stairs)  pt w/ difficulty wt  shift/seq w/ cues given  -       User Key  (r) = Recorded By, (t) = Taken By, (c) = Cosigned By    Initials Name Provider Type     Kelsi Jiang PTA Physical Therapy Assistant        Obj/Interventions     Row Name 08/03/20 0951          Therapeutic Exercise    Upper Extremity Range of Motion (Therapeutic Exercise)  shoulder flexion/extension, bilateral;shoulder horizontal abduction/adduction, bilateral;elbow flexion/extension, bilateral  -PH     Lower Extremity (Therapeutic Exercise)  heel slides, bilateral;SLR (straight leg raise), bilateral  -PH     Lower Extremity Range of Motion (Therapeutic Exercise)  ankle dorsiflexion/plantar flexion, bilateral;hip abduction/adduction, bilateral  -PH     Exercise Type (Therapeutic Exercise)  AAROM (active assistive range of motion);PROM (passive range of motion)  -PH     Position (Therapeutic Exercise)  other (see comments) reclined in chair  -PH     Sets/Reps (Therapeutic Exercise)  1/10  -PH     Comment (Therapeutic Exercise)  pt having difficulty following instructions req tactile/verbal cues throughout  -     Row Name 08/03/20 0951          Static Sitting Balance    Level of [place] (Unsupported Sitting, Static Balance)  contact guard assist;minimal assist, 75% patient effort;1 person assist  -     Sitting Position (Unsupported Sitting, Static Balance)  sitting on edge of bed  -     Time Able to Maintain Position (Unsupported Sitting, Static Balance)  2 to 3 minutes  -     Comment (Unsupported Sitting, Static Balance)  cueing to hold herself upright  -       User Key  (r) = Recorded By, (t) = Taken By, (c) = Cosigned By    Initials Name Provider Type     Kelsi Jiang PTA Physical Therapy Assistant        Goals/Plan    No documentation.       Clinical Impression     Row Name 08/03/20 1417          Pain Assessment    Additional Documentation  Pain Scale: Numbers Pre/Post-Treatment (Group)  -     Row Name 08/03/20 5467           Pain Scale: Numbers Pre/Post-Treatment    Pain Scale: Numbers, Pretreatment  9/10  -PH     Pain Scale: Numbers, Post-Treatment  9/10  -PH     Pre/Post Treatment Pain Comment  Pt reporting pain in BLE when asked  -PH     Pain Intervention(s)  Ambulation/increased activity;Repositioned  -PH     Row Name 08/03/20 0953          Plan of Care Review    Plan of Care Reviewed With  patient;spouse;caregiver  -PH     Progress  no change  -PH     Outcome Summary  Pt remains confused today when working w/ PT. Pt very [place] and having difficulty following instructions. Pt transferred b>c in a few steps req max/dep x 2 and HHA x 2. PT will prog as pt mindy.   -PH     Row Name 08/03/20 0953          Vital Signs    O2 Delivery Pre Treatment  supplemental O2  -PH     O2 Delivery Intra Treatment  supplemental O2  -PH     O2 Delivery Post Treatment  supplemental O2  -PH     Row Name 08/03/20 0953          Positioning and Restraints    Pre-Treatment Position  in bed  -PH     Post Treatment Position  chair  -PH     In Chair  reclined;call light within reach;with family/caregiver;exit alarm on  -PH       User Key  (r) = Recorded By, (t) = Taken By, (c) = Cosigned By    Initials Name Provider Type    PH Kelsi Jiang PTA Physical Therapy Assistant        Outcome Measures     Row Name 08/03/20 5459          How much help from another person do you currently need...    Turning from your back to your side while in flat bed without using bedrails?  2  -PH     Moving from lying on back to sitting on the side of a flat bed without bedrails?  2  -PH     Moving to and from a bed to a chair (including a wheelchair)?  2  -PH     Standing up from a chair using your arms (e.g., wheelchair, bedside chair)?  2  -PH     Climbing 3-5 steps with a railing?  1  -PH     To walk in hospital room?  1  -PH     AM-PAC 6 Clicks Score (PT)  10  -PH     Row Name 08/03/20 0919          Functional Assessment    Outcome Measure Options  AM-PAC 6 Clicks Basic  Mobility (PT)  -PH       User Key  (r) = Recorded By, (t) = Taken By, (c) = Cosigned By    Initials Name Provider Type    Kelsi Montilla PTA Physical Therapy Assistant        Physical Therapy Education                 Title: PT OT SLP Therapies (In Progress)     Topic: Physical Therapy (In Progress)     Point: Mobility training (In Progress)     Description:   Instruct learner(s) on safety and technique for assisting patient out of bed, chair or wheelchair.  Instruct in the proper use of assistive devices, such as walker, crutches, cane or brace.              Patient Friendly Description:   It's important to get you on your feet again, but we need to do so in a way that is safe for you. Falling has serious consequences, and your personal safety is the most important thing of all.        When it's time to get out of bed, one of us or a family member will sit next to you on the bed to give you support.     If your doctor or nurse tells you to use a walker, crutches, a cane, or a brace, be sure you use it every time you get out of bed, even if you think you don't need it.    Learning Progress Summary           Patient Acceptance, E,D, NR,NL by  at 8/3/2020 0956    Acceptance, E,D, NR,NL by  at 8/1/2020 1327    Acceptance, E,D, NR by  at 7/30/2020 1737    Acceptance, E,TB,D, VU,NR by  at 7/27/2020 1237   Family Acceptance, E,D, NR by  at 7/30/2020 1737    Acceptance, E,TB, VU by AA at 7/28/2020 1904   Caregiver Acceptance, E,D, NR,NL by  at 8/1/2020 1327    Acceptance, E,D, NR by  at 7/30/2020 1737                   Point: Home exercise program (In Progress)     Description:   Instruct learner(s) on appropriate technique for monitoring, assisting and/or progressing patient with therapeutic exercises and activities.              Learning Progress Summary           Patient Acceptance, E,D, NR,NL by  at 8/3/2020 0956    Acceptance, E,D, NR,NL by  at 8/1/2020 1327    Acceptance, E,D, NR by RAHEEL  at 7/30/2020 1737    Acceptance, E,TB,D, VU,NR by  at 7/27/2020 1237   Family Acceptance, E,D, NR by  at 7/30/2020 1737    Acceptance, E,TB, VU by STEPHANIE at 7/28/2020 1904   Caregiver Acceptance, E,D, NR,NL by  at 8/1/2020 1327    Acceptance, E,D, NR by  at 7/30/2020 1737                   Point: Body mechanics (In Progress)     Description:   Instruct learner(s) on proper positioning and spine alignment for patient and/or caregiver during mobility tasks and/or exercises.              Learning Progress Summary           Patient Acceptance, E,D, NR,NL by  at 8/3/2020 0956    Acceptance, E,D, NR,NL by  at 8/1/2020 1327    Acceptance, E,D, NR by  at 7/30/2020 1737    Acceptance, E,TB,D, VU,NR by  at 7/27/2020 1237   Family Acceptance, E,D, NR by  at 7/30/2020 1737    Acceptance, E,TB, VU by STEPHANIE at 7/28/2020 1904   Caregiver Acceptance, E,D, NR,NL by  at 8/1/2020 1327    Acceptance, E,D, NR by  at 7/30/2020 1737                   Point: Precautions (In Progress)     Description:   Instruct learner(s) on prescribed precautions during mobility and gait tasks              Learning Progress Summary           Patient Acceptance, E,D, NR,NL by  at 8/3/2020 0956    Acceptance, E,D, NR,NL by  at 8/1/2020 1327    Acceptance, E,D, NR by  at 7/30/2020 1737    Acceptance, E,TB,D, VU,NR by  at 7/27/2020 1237   Family Acceptance, E,D, NR by  at 7/30/2020 1737    Acceptance, E,TB, VU by STEPHANIE at 7/28/2020 1904   Caregiver Acceptance, E,D, NR,NL by  at 8/1/2020 1327    Acceptance, E,D, NR by  at 7/30/2020 1737                               User Key     Initials Effective Dates Name Provider Type Discipline     04/03/18 -  Cecily Camp, PT Physical Therapist PT    RAHEEL 03/07/18 -  Beverly Estrada PTA Physical Therapy Assistant PT    PH 08/20/19 -  Kelsi Jiang PTA Physical Therapy Assistant PT    AA 05/21/20 -  Ary Michaud, RN Registered Nurse Nurse              PT Recommendation and  Plan     Plan of Care Reviewed With: patient, spouse, caregiver  Progress: no change  Outcome Summary: Pt remains confused today when working w/ PT. Pt very [place] and having difficulty following instructions. Pt transferred b>c in a few steps req max/dep x 2 and HHA x 2. PT will prog as pt mindy.      Time Calculation:   PT Charges     Row Name 08/03/20 0957             Time Calculation    Start Time  0845  -PH      Stop Time  0903  -PH      Time Calculation (min)  18 min  -PH      PT Received On  08/03/20  -PH      PT - Next Appointment  08/04/20  -        User Key  (r) = Recorded By, (t) = Taken By, (c) = Cosigned By    Initials Name Provider Type    PH Kelsi Jiang PTA Physical Therapy Assistant        Therapy Charges for Today     Code Description Service Date Service Provider Modifiers Qty    95674899953 HC PT THER PROC EA 15 MIN 8/3/2020 Kelsi Jiang PTA GP 1    83582512365 HC PT THER SUPP EA 15 MIN 8/3/2020 Kelsi Jiang PTA GP 1          PT G-Codes  Outcome Measure Options: AM-PAC 6 Clicks Basic Mobility (PT)  AM-PAC 6 Clicks Score (PT): 10    Kelsi Jiang PTA  8/3/2020

## 2020-08-03 NOTE — PLAN OF CARE
Problem: Patient Care Overview  Goal: Plan of Care Review  Outcome: Ongoing (interventions implemented as appropriate)  Flowsheets (Taken 8/3/2020 4849)  Progress: no change  Plan of Care Reviewed With: patient;spouse;caregiver  Outcome Summary: Pt remains confused today when working w/ PT. Pt very [place] and having difficulty following instructions. Pt transferred b>c in a few steps req max/dep x 2 and HHA x 2. PT will prog as pt mindy.     Patient was intermittently wearing a face mask during this therapy encounter. Therapist used appropriate personal protective equipment including eye protection, mask, and gloves.  Mask used was standard procedure mask. Appropriate PPE was worn during the entire therapy session. Hand hygiene was completed before and after therapy session. Patient is not in enhanced droplet precautions.

## 2020-08-03 NOTE — CONSULTS
Referring Provider: Adali Thompson MD  [address] [facility]  [address]  [place] [address]  Reason for Consultation: COVID    Subjective   History of present illness:   The medical history is obtained from the medical record and nursing as the patient is too confused to provide any meaningful history    This is an 87-year-old female with rheumatoid arthritis, hypertension, hypothyroidism, and vascular dementia who was admitted on July 25 with altered mental status and hypoxia.  Apparently the patient has not been feeling well few days prior to presentation.  Apparently she was having some cough and congestion.  The day of admission the patient developed acute confusion.  Her oxygenation was checked and she was satting 80 to 83% on room air.  Admission blood work revealed a normal white blood cell count with a procalcitonin of 0.6.  Chest x-ray showed right-sided pneumonia.  COVID testing came back negative.  Pulmonary was consulted and she was started on a nonrebreather.  She was empirically started on vancomycin and Zosyn.  Over the next few days the patient's mental status improved and her oxygen requirement started to decrease.  On July 27 she was requiring 4 to 5 L of oxygen.  Chest x-ray done on July 28 showed development of left-sided infiltrates dressed thought to be due to pulmonary edema.  Radiology was consulted and she was started on IV Lasix.  Her oxygenation improved after diuresis.  She was transitioned to Augmentin on July 29.  She continued to improve and by July 31 she was on 2 L nasal cannula.  This morning the admitting team was informed that the patient's  tested positive for COVID and therefore COVID testing was obtained on the patient and came back positive.    Past Medical History:   Diagnosis Date   • Anxiety    • Arthritis    • Chronic back pain    •  Hypothyroidism    • Hypertension    • Hypothyroidism    • Osteoarthritis    • Rheumatoid arthritis (CMS/[facility])    • Tachy-abbey  syndrome (CMS/HCC)    Vascular dementia    Past Surgical History:   Procedure Laterality Date   • COLON RESECTION WITH COLOSTOMY     • COLOSTOMY CLOSURE     • SIGMOIDOSCOPY     • TONSILLECTOMY          reports that she has never smoked. She has never used smokeless tobacco. She reports that she does not drink alcohol or use drugs.    family history includes Aneurysm in her father; Cancer in her mother; Diabetes in her sister; Stomach cancer in her paternal grandmother; Stroke in her father.    Allergies   Allergen Reactions   • Marigold [Calendula Officinalis (Marigold)] Other (See Comments)     Patient states she is not allergic   • Sulfa Antibiotics    • Sulfites Swelling     Made her face swells and caused her not to breath       Medication:  Antibiotics:  None    Please refer to the medical record for a full medication list    Review of Systems  Review of systems could not be obtained due to   patient confusion.    Objective   Vital Signs   Temp:  [97.7 °F (36.5 °C)-98.9 °F (37.2 °C)] 97.7 °F (36.5 °C)  Heart Rate:  [77-91] 91  Resp:  [18] 18  BP: (126-159)/(73-86) 148/84  95% on 3 L nasal cannula    Physical Exam:   General: Anxious as she feels like she has been kidnapped  HEENT: Normocephalic, atraumatic, PERRL, able to assess oropharynx  Neck: Supple, trachea is midline  Cardiovascular: Normal rate, regular rhythm, fran S1 and S2, no murmurs, rubs, or gallops    Respiratory: Coarse breath sounds bilaterally no wheezing   GI: Abdomen is soft, non-tender, non-distended, positive bowel sounds bilaterally  Musculoskeletal:  no edema, tenderness or deformity  Skin: No rashes   Extremities: no E/C/C  Neurological: Alert, moving all 4 extremities, confused  Psychiatric: Anxious    Results Review:   I reviewed the patient's new clinical results.  I reviewed the patient's new imaging results and agree with the interpretation.    Lab Results   Component Value Date    WBC 10.43 08/02/2020    HGB 10.4 (L) 08/02/2020     HCT 30.8 (L) 08/02/2020    MCV 93.3 08/02/2020     08/02/2020       Lab Results   Component Value Date    GLUCOSE 96 08/02/2020    BUN 19 08/02/2020    CREATININE 0.70 08/02/2020    EGFRIFNONA 79 08/02/2020    BCR 27.1 (H) 08/02/2020    CO2 30.3 (H) 08/02/2020    CALCIUM 9.5 08/02/2020    ALBUMIN 3.10 (L) 07/31/2020    LABIL2 1.4 07/06/2018    AST 18 07/27/2020    ALT 13 07/27/2020     Procalcitonin 0.6  Urinalysis moderate blood 13-20 red blood cells no bacteria seen    Microbiology:  8/3 COVID +  7/26 MRSA positive  7/25 BCx Neg x 2  7/25 RVP/COVID neg    Radiology:  Admission chest x-ray personally reviewed by me shows infiltrates on the right.  No pleural effusions    Admission CT of the head is negative for acute findings.    7/28 chest x-ray shows development of new infiltrates on the left    Assessment/Plan   COVID pneumonia  Acute hypoxic respiratory failure -improving  Systolic CHF  Vascular dementia complicating above  Right-sided bacterial pneumonia    It is unclear when the patient was exposed to COVID (whether this was present on admission and simply there is not enough virus to trigger a positive test on admission or whether she was exposed to her  who visit her daily).  Her admission testing was positive.  She did present with right-sided pneumonia and acute hypoxic respiratory failure but her procalcitonin was high and she responded to IV antibiotics which would favor her admission presentation to be bacterial pneumonia.  She had a worsening in her respiratory symptoms around July 28 with chest x-ray showing left-sided infiltrate.  At that point she was diagnosed with pulmonary edema and certainly responded to IV diuresis.    Overall the patient has improved considerably since admission.  Her oxygen requirement has been trending down and she is on 2 to 3 L of oxygen via nasal cannula I agree with initiating dexamethasone but for right now would hold off any additional experimental  therapy to see if she continues to improve or worsens.  Will obtain ferritin and CRP    I discussed the patients findings and my recommendations with nursing staff and consulting provider

## 2020-08-03 NOTE — PLAN OF CARE
Problem: Patient Care Overview  Goal: Plan of Care Review  Outcome: Ongoing (interventions implemented as appropriate)  Flowsheets (Taken 8/3/2020 0615)  Progress: improving  Plan of Care Reviewed With: patient; caregiver  Outcome Summary: Vitals stable. No falls. Pain treated x1. Remains disoriented to time and situation. On 2L nasal cannula. Private sitter at beside. Turned Q2. Resting comfortably. Monitoring closely.

## 2020-08-03 NOTE — TELEPHONE ENCOUNTER
----- Message from Sheldon Pereyra MD sent at 7/31/2020 12:25 PM EDT -----  Patient will be switching to me in clinic.  Please cancel any appts with SANDRA, and make a 2 week f/u with TK.  Call her  on Monday to set it up.    basia champion

## 2020-08-03 NOTE — PROGRESS NOTES
Continued Stay Note  [facility]     Patient Name: Yeni Jimenez  MRN: [id number]  Today's Date: 8/3/2020    Admit Date: 7/25/2020    Discharge Plan     Row Name 08/03/20 1433       Plan    Plan  Plan home with spouse and private caregivers and [facility].   CARLITOS Robbins RN    Plan Comments  Called pt's daughter ( Nikki Hopkins [phone]) no answer unable to leave voicemail.   Pt's spouse being admitted to [facility].   Plan home with spouse and private caregivers and [facility].   CARLITOS Robbins RN        Discharge Codes    No documentation.             Lynda Robbins, RN

## 2020-08-03 NOTE — PROGRESS NOTES
Name: Yeni Jimenez ADMIT: 2020   : 1932  PCP: Arian Moyer MD    MRN: [id number] LOS: 9 days   AGE/SEX: 87 y.o. female  ROOM: [facility]     Subjective   Subjective   CC: dyspnea  No acute events. Patient states her breathing is better. She is a little more coherent today.  She did sleep some last night. Taking PO. No fevers or chills.    Objective   Objective   Vital Signs  Temp:  [97.7 °F (36.5 °C)-98.9 °F (37.2 °C)] 97.7 °F (36.5 °C)  Heart Rate:  [76-86] 77  Resp:  [18] 18  BP: (126-159)/(70-86) 157/74  SpO2:  [94 %-97 %] 95 %  on  Flow (L/min):  [2-3] 3;   Device (Oxygen Therapy): nasal cannula  Body mass index is 25.49 kg/m².  Physical Exam   Constitutional: No distress.   HENT:   Head: Normocephalic and atraumatic.   Mouth/Throat: Oropharynx is clear and moist.   Eyes: Pupils are equal, round, and reactive to light. Conjunctivae and EOM are normal.   Neck: Normal range of motion. Neck supple. No JVD present.   Cardiovascular: Normal rate, regular rhythm and intact distal pulses.   Pulmonary/Chest: Effort normal. She has decreased breath sounds in the right lower field and the left lower field. She has no wheezes.   Abdominal: Soft. Bowel sounds are normal.   Musculoskeletal: She exhibits no edema or tenderness.   Neurological: She is alert. No cranial nerve deficit. She exhibits normal muscle tone.   Skin: Skin is warm and dry. Capillary refill takes less than 2 seconds. She is not diaphoretic.   Psychiatric: Her speech is normal. Cognition and memory are impaired.   Nursing note and vitals reviewed.    Results Review:       I reviewed the patient's new clinical results.  I reviewed the patient's telemetry.  Results from last 7 days   Lab Units 20  0711 20  0358 20  0616   WBC 10*3/mm3 10.43 13.93* 8.53   HEMOGLOBIN g/dL 10.4* 11.9* 11.6*   PLATELETS 10*3/mm3 218 239 194     Results from last 7 days   Lab Units 20  0711 20  0358 20  0616  07/28/20  1102   SODIUM mmol/L 143 141 142  --    POTASSIUM mmol/L 3.5 3.8 2.7*  --    CHLORIDE mmol/L 105 102 100  --    CO2 mmol/L 30.3* 27.4 27.2  --    BUN mg/dL 19 20 11  --    CREATININE mg/dL 0.70 0.69 0.58 0.66   GLUCOSE mg/dL 96 106* 154*  --    Estimated Creatinine Clearance: 41.6 mL/min (by C-G formula based on SCr of 0.7 mg/dL).  Results from last 7 days   Lab Units 07/31/20  0616   ALBUMIN g/dL 3.10*     Results from last 7 days   Lab Units 08/02/20  0711 08/01/20  0358 07/31/20  0616   CALCIUM mg/dL 9.5 10.2 9.3   ALBUMIN g/dL  --   --  3.10*   MAGNESIUM mg/dL  --   --  1.9   PHOSPHORUS mg/dL  --   --  2.9             No results found for: HGBA1C, POCGLU    Adult Transthoracic Echo Complete W/ Cont if Necessary Per Protocol  · Left ventricular systolic function is moderately decreased. Calculated   EF = 37.0%. Estimated EF was in disagreement with the calculated EF.   Estimated EF appears to be in the range of 41 - 45%. The estimated   ejection fraction was higher than the calculated ejection fraction.   Definity was indicated however patient refused..  · Normal left ventricular cavity size noted. Wall motion abnormalities are   noted below. Left ventricular wall thickness is consistent with   mild-to-moderate concentric hypertrophy. Septal wall motion is abnormal.  · Mild aortic valve regurgitation is present.  · Mild MAC is present. There is mild bileaflet mitral valve thickening   present. Mild-to-moderate mitral valve regurgitation is present. No   significant mitral valve stenosis is present.  · Mild to moderate tricuspid valve regurgitation is present. Estimated   right ventricular systolic pressure from tricuspid regurgitation is mildly   elevated (35-45 mmHg). Calculated right ventricular systolic pressure from   tricuspid regurgitation is 41 mmHg.           aspirin 81 mg Oral Daily   dilTIAZem  mg Oral Q24H   doxycycline 100 mg Oral Q12H   furosemide 20 mg Oral Daily    ipratropium-albuterol 3 mL Nebulization Q4H While Awake - RT   levothyroxine 112 mcg Oral Daily   losartan 100 mg Oral Q24H   multivitamin with minerals 1 tablet Oral Daily   OLANZapine 5 mg Intramuscular Once   OLANZapine 10 mg Oral Daily Before Supper   pantoprazole 40 mg Oral QAM   polyethylene glycol 17 g Oral Daily   potassium chloride 20 mEq Oral Daily   predniSONE 20 mg Oral Daily With Breakfast   senna 2 tablet Oral BID   traZODone 100 mg Oral Nightly      Diet Dysphagia; IV - Mechanical Soft No Mixed Consistencies; Nectar / Syrup Thick       Assessment/Plan     Active Hospital Problems    Diagnosis  POA   • **Acute respiratory failure with hypoxia (CMS/[facility]) [J96.01]  Yes   • Asthma exacerbation [J45.901]  Yes   • Rheumatoid arthritis involving multiple sites (CMS/[facility]) [M06.9]  Yes   • Polypharmacy [Z79.899]  Not Applicable   • Elevated lactic acid level [R79.89]  Yes   • 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   Pneumonia  - has bilateral infiltrates, worse on the right  - completed 7d course of augmentin  - pulmonary toilet    Asthma Exacerbation  - continue on prednisone low dose (given age) and nebulized bronchodilators  - on 10 mg prednisone daily at home  - appreciate pulmonology recs-I d/w Dr. Rodrigo Gonzalez    Hypothyroidism  - continue on levothyroxine    Multifocal Atrial Tachycardia  - now resolved  - on diltiazem  - appreciate cardiology recs    Chronic Systolic CHF  - volume status is stable   - on her home dose of lasix 20mg daily      *of note I am informed the patient's  who has been at her bedside daily (who I have not met as he was not at bedside yesterday or today during my rounds, I have only met caregivers) has tested  positive for SARS-CoV-2.  Will isolate the patient and test her for this virus.      SCDs for DVT prophylaxis.  Full code.  Discussed with patient and nursing staff. (caregiver at bedside)  Anticipate discharge TBD.  in 2-3 days.      Karri Simon MD  [facility]  08/03/20  13:14    Patient was wearing facemask when I entered the room and throughout our encounter.  I wore protective equipment throughout this patient encounter including a face mask, gloves and protective eyewear.  Hand hygiene was performed before donning protective equipment and after removal when leaving the room.

## 2020-08-04 PROBLEM — U07.1 PNEUMONIA DUE TO COVID-19 VIRUS: Status: ACTIVE | Noted: 2020-01-01

## 2020-08-04 PROBLEM — J12.82 PNEUMONIA DUE TO COVID-19 VIRUS: Status: ACTIVE | Noted: 2020-01-01

## 2020-08-04 NOTE — PLAN OF CARE
Problem: Patient Care Overview  Goal: Plan of Care Review  Outcome: Ongoing (interventions implemented as appropriate)  Flowsheets  Taken 8/4/2020 0349  Progress: no change  Outcome Summary: Pt confused, [place], q2 turned, on 3-3.5L O2 throughout the night. Purewick in place. Medicated per orders. No s/s of distress at this time, VSS, will cont to monitor.  Taken 8/3/2020 5232  Plan of Care Reviewed With: patient     Problem: Fall Risk (Adult)  Goal: Absence of Fall  Outcome: Ongoing (interventions implemented as appropriate)  Flowsheets (Taken 8/4/2020 6819)  Absence of Fall: making progress toward outcome     Problem: Skin Injury Risk (Adult)  Goal: Skin Health and Integrity  Outcome: Ongoing (interventions implemented as appropriate)  Flowsheets (Taken 8/4/2020 2259)  Skin Health and Integrity: making progress toward outcome

## 2020-08-04 NOTE — PROGRESS NOTES
"Adult Nutrition  Assessment/PES    Patient Name:  Yeni Jimenez  YOB: 1932  MRN: [id number]  Admit Date:  7/25/2020    Assessment Date:  8/4/2020    Comments:  Nutrition assessment triggered by LOS x 10 days.  Confused, disoriented x 3.  Becoming more alert per chart review.  Admitted with acute respiratory failure.  Patient has private CG and  visits her often, however patient's  tested positive for COVID and therefore patient is now positive for COVID.      Per chart PO data, few bites at breakfast this am.  No other PO data available.    Adding Mighty Shake daily and Magic Cups BID to promote kcal and protein intake.    Due to the COVID pandemic, nutrition assessment completed based on review of electronic medical record. This RD currently working remotely and can be reached via secure chat or email.     RD will continue to monitor.     Reason for Assessment     Row Name 08/04/20 1010          Reason for Assessment    Reason For Assessment  per organizational policy LOS     Diagnosis  other (see comments);cardiac disease;neurologic conditions;endocrine conditions;psychosocial;pulmonary disease;infection/sepsis RA, HTN, hypothyroid, dementia, anxiety, arthritis, OA; adm with acute respiratory failure, now +COVID         Nutrition/Diet History     Row Name 08/04/20 1011          Nutrition/Diet History    Typical Food/Fluid Intake  few bites at breakfast this am per chart PO data         Anthropometrics     Row Name 08/04/20 1011 08/04/20 0500       Anthropometrics    Height  154.9 cm (60.98\")  --    Weight  --  60.9 kg (134 lb 4.8 oz)       Admit Weight    Admit Weight  -- 134# 8/4  --       Ideal Body Weight (IBW)    Ideal Body Weight (IBW) (kg)  48.11  --       Body Mass Index (BMI)    BMI Assessment  BMI 25-29.9: overweight 25.31  --        Labs/Tests/Procedures/Meds     Row Name 08/04/20 1012          Labs/Procedures/Meds    Lab Results Reviewed  reviewed, pertinent     Lab Results " "Comments  CRP, WBC, Hgb        Diagnostic Tests/Procedures    Diagnostic Test/Procedure Reviewed  reviewed, pertinent        Medications    Pertinent Medications Reviewed  reviewed, pertinent     Pertinent Medications Comments  decadron, lasix, synthroid, MVI, protonix, miralax, senokot, KCl         Physical Findings     Row Name 08/04/20 1013          Physical Findings    Overall Physical Appearance  on oxygen therapy;overweight     Skin  edema;other (see comments) B=14, bruised         Estimated/Assessed Needs     Row Name 08/04/20 1011          Calculation Measurements    Height  154.9 cm (60.98\")         Nutrition Prescription Ordered     Row Name 08/04/20 1013          Nutrition Prescription PO    Current PO Diet  Dysphagia     Dysphagia Level  4  Mechanical soft no mixed consistencies     Fluid Consistency  Nectar/syrup thick         Evaluation of Received Nutrient/Fluid Intake     Row Name 08/04/20 1013          PO Evaluation    Number of Meals  1     % PO Intake  few bites at breakfast this am         Problem/Interventions:  Problem 1     Row Name 08/04/20 1014          Nutrition Diagnoses Problem 1    Problem 1  Inadequate Intake/Infusion     Inadequate Intake Type  Oral     Macronutrient  Kcal;Protein     Etiology (related to)  Factors Affecting Nutrition;Medical Diagnosis     Infectious Disease  Other (comment) + COVID      Pulmonary/Critical Care  Acute respiratory failure     Mental State/Condition  Confusion     Signs/Symptoms (evidenced by)  Report/Observation;PO Intake         Intervention Goal     Row Name 08/04/20 1015          Intervention Goal    General  Maintain nutrition;Reduce/improve symptoms;Disease management/therapy     PO  Tolerate PO;Increase intake;PO intake (%)     PO Intake %  75 %     Weight  Maintain weight         Nutrition Intervention     Row Name 08/04/20 1015          Nutrition Intervention    RD/Tech Action  Follow Tx progress;Care plan reviewd;Recommend/ordered     " Recommended/Ordered  Supplement         Nutrition Prescription     Row Name 08/04/20 1015          Nutrition Prescription PO    PO Prescription  Begin/change supplement     Supplement  Mighty Shake;Magic Cup     Supplement Frequency  3 times a day     New PO Prescription Ordered?  Yes         Education/Evaluation     Row Name 08/04/20 1015          Education    Education  Will Instruct as appropriate        Monitor/Evaluation    Monitor  Per protocol;PO intake;Supplement intake;Pertinent labs;Weight;Symptoms           Electronically signed by:  Missy Butler RD  08/04/20 10:16

## 2020-08-04 NOTE — PROGRESS NOTES
Name: Yeni Jimenez ADMIT: 2020   : 1932  PCP: Arian Moyer MD    MRN: [id number] LOS: 10 days   AGE/SEX: 87 y.o. female  ROOM: [facility]     Subjective   Subjective   CC: dyspnea  Patient has been diagnosed with COVID-19 after her  was diagnosed with same. Despite this she is overall is feeling better. She is a little more confused and resistant to care but she has been taking her medicines with encouragement.  Taking PO. No fevers or chills.    Objective   Objective   Vital Signs  Temp:  [97.2 °F (36.2 °C)-99.2 °F (37.3 °C)] 97.2 °F (36.2 °C)  Heart Rate:  [] 94  Resp:  [16-18] 18  BP: (148-164)/() 158/82  SpO2:  [92 %-96 %] 95 %  on  Flow (L/min):  [2.5-3.5] 2.5;   Device (Oxygen Therapy): nasal cannula  Body mass index is 25.39 kg/m².  Physical Exam   Constitutional: No distress.   HENT:   Head: Normocephalic and atraumatic.   Mouth/Throat: Oropharynx is clear and moist.   Eyes: Pupils are equal, round, and reactive to light. Conjunctivae and EOM are normal.   Neck: Normal range of motion. Neck supple. No JVD present.   Cardiovascular: Normal rate, regular rhythm and intact distal pulses.   Pulmonary/Chest: Effort normal. She has decreased breath sounds in the right lower field and the left lower field. She has no wheezes.   Abdominal: Soft. Bowel sounds are normal.   Musculoskeletal: She exhibits no edema or tenderness.   Neurological: She is alert. No cranial nerve deficit. She exhibits normal muscle tone.   Skin: Skin is warm and dry. Capillary refill takes less than 2 seconds. She is not diaphoretic.   Psychiatric: Her speech is normal. Cognition and memory are impaired.   Nursing note and vitals reviewed.    Results Review:       I reviewed the patient's new clinical results.  I reviewed the patient's telemetry.  Results from last 7 days   Lab Units 20  0815 20  0711 20  0358 20  0616   WBC 10*3/mm3 14.29* 10.43 13.93* 8.53   HEMOGLOBIN  g/dL 11.7* 10.4* 11.9* 11.6*   PLATELETS 10*3/mm3 238 218 239 194     Results from last 7 days   Lab Units 08/02/20  0711 08/01/20  0358 07/31/20  0616   SODIUM mmol/L 143 141 142   POTASSIUM mmol/L 3.5 3.8 2.7*   CHLORIDE mmol/L 105 102 100   CO2 mmol/L 30.3* 27.4 27.2   BUN mg/dL 19 20 11   CREATININE mg/dL 0.70 0.69 0.58   GLUCOSE mg/dL 96 106* 154*   Estimated Creatinine Clearance: 41.5 mL/min (by C-G formula based on SCr of 0.7 mg/dL).  Results from last 7 days   Lab Units 07/31/20  0616   ALBUMIN g/dL 3.10*     Results from last 7 days   Lab Units 08/02/20  0711 08/01/20  0358 07/31/20  0616   CALCIUM mg/dL 9.5 10.2 9.3   ALBUMIN g/dL  --   --  3.10*   MAGNESIUM mg/dL  --   --  1.9   PHOSPHORUS mg/dL  --   --  2.9         Results from last 7 days   Lab Units 08/03/20  1314   COVID19  Detected*       No results found for: HGBA1C, POCGLU    Adult Transthoracic Echo Complete W/ Cont if Necessary Per Protocol  · Left ventricular systolic function is moderately decreased. Calculated   EF = 37.0%. Estimated EF was in disagreement with the calculated EF.   Estimated EF appears to be in the range of 41 - 45%. The estimated   ejection fraction was higher than the calculated ejection fraction.   Definity was indicated however patient refused..  · Normal left ventricular cavity size noted. Wall motion abnormalities are   noted below. Left ventricular wall thickness is consistent with   mild-to-moderate concentric hypertrophy. Septal wall motion is abnormal.  · Mild aortic valve regurgitation is present.  · Mild MAC is present. There is mild bileaflet mitral valve thickening   present. Mild-to-moderate mitral valve regurgitation is present. No   significant mitral valve stenosis is present.  · Mild to moderate tricuspid valve regurgitation is present. Estimated   right ventricular systolic pressure from tricuspid regurgitation is mildly   elevated (35-45 mmHg). Calculated right ventricular systolic pressure from      tricuspid regurgitation is 41 mmHg.           albuterol 2.5 mg Nebulization 4x Daily - RT   aspirin 81 mg Oral Daily   budesonide-formoterol 2 puff Inhalation BID - RT   dexamethasone 6 mg Oral Daily With Breakfast   dilTIAZem  mg Oral Q24H   furosemide 20 mg Oral Daily   levothyroxine 112 mcg Oral Daily   losartan 100 mg Oral Q24H   multivitamin with minerals 1 tablet Oral Daily   OLANZapine 5 mg Intramuscular Once   OLANZapine 10 mg Oral Daily Before Supper   pantoprazole 40 mg Oral QAM   polyethylene glycol 17 g Oral Daily   potassium chloride 20 mEq Oral Daily   senna 2 tablet Oral BID   tiotropium bromide monohydrate 2 puff Inhalation Daily - RT   traZODone 100 mg Oral Nightly      Diet Dysphagia; IV - Mechanical Soft No Mixed Consistencies; Nectar / Syrup Thick       Assessment/Plan     Active Hospital Problems    Diagnosis  POA   • **Acute respiratory failure with hypoxia (CMS/[facility]) [J96.01]  Yes   • Pneumonia due to COVID-19 virus [U07.1, J12.89]  No   • Aspiration pneumonia of right lung due to vomit (CMS/[facility]) [J69.0]  Yes   • Acute on chronic combined systolic and diastolic CHF (congestive heart failure) (CMS/[facility]) [I50.43]  Yes   • Asthma exacerbation [J45.901]  Yes   • Rheumatoid arthritis involving multiple sites (CMS/[facility]) [M06.9]  Yes   • Polypharmacy [Z79.899]  Not Applicable   • Elevated lactic acid level [R79.89]  Yes   • 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   Aspiration Pneumonia  - has bilateral infiltrates, worse on the right  - completed 7d course of augmentin with improvement in respiratory status  - pulmonary toilet    Asthma Exacerbation  - continue on steroids and  nebulized bronchodilators  - on 10 mg prednisone daily at home  - appreciate pulmonology recs-I d/w Dr. Rodrigo CONTRERAS-19 PNA  - admission test was negative, diagnosed after her , who has been with her consistently, was diagnosed  - continue on decadron  - d/w Dr. Lisseth [place] of infectious diseases-not currently going to pursue experimental therapies but instead will monitor her clinical course as she seems to be improving; appreciate recs    Hypothyroidism  - continue on levothyroxine    Multifocal Atrial Tachycardia  - now resolved  - on diltiazem  - appreciate cardiology recs    Acute on Chronic Systolic CHF  - volume status is improved after IV lasix  - on her home dose of lasix 20mg daily    SCDs for DVT prophylaxis.  Full code.  Discussed with patient, nursing staff, consulting provider and care team on multidisciplinary rounds.  Anticipate discharge TBD.  in 2-3 days.      Karri Simon MD  [facility]  08/04/20  12:21    Patient was wearing facemask when I entered the room and throughout our encounter.  I wore protective equipment throughout this patient encounter including a face mask, gloves and protective eyewear.  Hand hygiene was performed before donning protective equipment and after removal when leaving the room.

## 2020-08-04 NOTE — PROGRESS NOTES
"  Daily Progress Note.   [address]  8/4/2020    Patient:  Name:  Yeni Jimenez  MRN:  [id number]  11/6/1932  87 y.o.  female         CC: Short of breath    Interval History:  Follow up ae of copd, pna    Very confused, hallucinating, seeing people  Cant get much history otherwise    ROS: otherwise limited by altered mental status  PMFSSH: no change    Physical Exam:  /88 (BP Location: Right arm, Patient Position: Lying)   Pulse 76   Temp 98.3 °F (36.8 °C) (Oral)   Resp 18   Ht 154.9 cm (60.98\")   Wt 60.9 kg (134 lb 4.8 oz)   SpO2 95%   BMI 25.39 kg/m²   Body mass index is 25.39 kg/m².    Intake/Output Summary (Last 24 hours) at 8/4/2020 1654  Last data filed at 8/4/2020 1300  Gross per 24 hour   Intake 120 ml   Output 1500 ml   Net -1380 ml     General appearance: Confused but alert, conversant   Eyes: anicteric sclerae, moist conjunctivae; no lid-lag; PERRLA  HENT: Atraumatic; oropharynx clear with moist mucous membranes and no mucosal ulcerations; normal hard and soft palate  Neck: Trachea midline; FROM, supple, no thyromegaly or lymphadenopathy  Lungs: no expiratory wheeze +int rhonchi, with normal respiratory effort and no intercostal retractions  CV: RRR, no rub  Abdomen: Soft, non-tender; no masses or HSM  Extremities: Trace bilateral peripheral edema or extremity lymphadenopathy  Skin: Normal temperature, turgor and texture; no rash, ulcers or subcutaneous nodules  Psych: Confused affect, not alert and oriented    Data Review:  Notable Labs:  Results from last 7 days   Lab Units 08/04/20  0815 08/02/20  0711 08/01/20  0358 07/31/20  0616   WBC 10*3/mm3 14.29* 10.43 13.93* 8.53   HEMOGLOBIN g/dL 11.7* 10.4* 11.9* 11.6*   PLATELETS 10*3/mm3 238 218 239 194     Results from last 7 days   Lab Units 08/02/20  0711 08/01/20  0358 07/31/20  0616   SODIUM mmol/L 143 141 142   POTASSIUM mmol/L 3.5 3.8 2.7*   CHLORIDE mmol/L 105 102 100   CO2 mmol/L 30.3* 27.4 27.2   BUN mg/dL 19 20 " 11   CREATININE mg/dL 0.70 0.69 0.58   GLUCOSE mg/dL 96 106* 154*   CALCIUM mg/dL 9.5 10.2 9.3   MAGNESIUM mg/dL  --   --  1.9   PHOSPHORUS mg/dL  --   --  2.9   Estimated Creatinine Clearance: 41.5 mL/min (by C-G formula based on SCr of 0.7 mg/dL).    Results from last 7 days   Lab Units 08/04/20  0815 08/02/20  0711 08/01/20  0358   FERRITIN ng/mL 416.00*  --   --    CRP mg/dL 2.65*  --   --    PLATELETS 10*3/mm3 238 218 239             Imaging:  Reviewed chest images personally from past 3 days    Scheduled meds:      albuterol 2.5 mg Nebulization 4x Daily - RT   aspirin 81 mg Oral Daily   budesonide-formoterol 2 puff Inhalation BID - RT   dexamethasone 6 mg Oral Daily With Breakfast   dilTIAZem  mg Oral Q24H   furosemide 20 mg Oral Daily   levothyroxine 112 mcg Oral Daily   losartan 100 mg Oral Q24H   multivitamin with minerals 1 tablet Oral Daily   OLANZapine 5 mg Intramuscular Once   OLANZapine 10 mg Oral Daily Before Supper   pantoprazole 40 mg Oral QAM   polyethylene glycol 17 g Oral Daily   potassium chloride 20 mEq Oral Daily   senna 2 tablet Oral BID   tiotropium bromide monohydrate 2 puff Inhalation Daily - RT   traZODone 100 mg Oral Nightly       ASSESSMENT  /  PLAN:  1. Acute respiratory failure, hypoxia  2. Bilateral pulmonary infiltrates, R>L  3. Dysphagia with pills only previously  4. Acute systolic and diastolic CHF  5. Acute lactic acidosis  6. Hx RA  7. Chronic prednisone 5 mg  8. Asthma exacerbation  9. Diuretic induced hypokalemia     PLAN:  Difficult to determine exact right answer for her steroids.  She has covid and she is hypoxic so will continue dexamethasone for 10 day course.  Impossible to exactly determine when she got covid, and if it is affecting her hypoxia.      Completed 7 days of antibiotic    Oxygen requirementsstable    Continue with a dysphagia diet    Patient seen and examined using gown, double gloves, appropriate mask and goggles as per current COVID PPE CDC  guidelines.      Rodrigo Gonzalez MD  [facility]  08/04/20  535

## 2020-08-04 NOTE — PLAN OF CARE
Confused. [place]. Turned Q2. Medicated for pain one time. VSS.. Enhanced Precautions maintained. Will continue to monitor.  Problem: Patient Care Overview  Goal: Plan of Care Review  Outcome: Ongoing (interventions implemented as appropriate)  Goal: Individualization and Mutuality  Outcome: Ongoing (interventions implemented as appropriate)  Goal: Discharge Needs Assessment  Outcome: Ongoing (interventions implemented as appropriate)  Goal: Interprofessional Rounds/Family Conf  Outcome: Ongoing (interventions implemented as appropriate)     Problem: Fall Risk (Adult)  Goal: Absence of Fall  Outcome: Ongoing (interventions implemented as appropriate)     Problem: Skin Injury Risk (Adult)  Goal: Skin Health and Integrity  Outcome: Ongoing (interventions implemented as appropriate)     Problem: Pain, Chronic (Adult)  Goal: Acceptable Pain/Comfort Level and Functional Ability  Outcome: Ongoing (interventions implemented as appropriate)     Problem: Pneumonia (Adult)  Goal: Signs and Symptoms of Listed Potential Problems Will be Absent, Minimized or Managed (Pneumonia)  Outcome: Ongoing (interventions implemented as appropriate)

## 2020-08-05 NOTE — PROGRESS NOTES
Continued Stay Note  [facility]     Patient Name: Yeni Jimenez  MRN: [id number]  Today's Date: 8/5/2020    Admit Date: 7/25/2020    Discharge Plan     Row Name 08/05/20 1014       Plan    Plan  Plan home with spouse and private caregivers and [facility].  CARLITOS Robbins RN    Plan Comments  Spoke with pt's spouse (Dr. Jimenez) who is a pt.   Dr. Jimenez confirms plan is home with private caregivers and [facility]. Plan home with spouse and private caregivers and [facility]   CARLITOS Robbins RN        Discharge Codes    No documentation.             Lynda Robbins RN

## 2020-08-05 NOTE — PROGRESS NOTES
"  Daily Progress Note.   [address]  8/5/2020    Patient:  Name:  Yeni Jimenez  MRN:  [id number]  11/6/1932  87 y.o.  female         CC: Short of breath    Interval History:  Follow up ae of copd, pna    He is confused but calmer today.  She says she cannot speak to me because she does not speak Chinese despite the fact that I am speaking in English.  She appears comfortable though her respiratory pattern is very calm.  She was asleep upon my entry to the room awakens  ROS: otherwise limited by altered mental status  PMFSSH: no change    Physical Exam:  /87 (BP Location: Right arm, Patient Position: Lying) Comment: nurse notified  Pulse 84   Temp 98.6 °F (37 °C) (Oral)   Resp 20   Ht 154.9 cm (60.98\")   Wt 57.8 kg (127 lb 6.4 oz)   SpO2 100%   BMI 24.08 kg/m²   Body mass index is 24.08 kg/m².    Intake/Output Summary (Last 24 hours) at 8/5/2020 1821  Last data filed at 8/5/2020 1700  Gross per 24 hour   Intake 600 ml   Output 650 ml   Net -50 ml     General appearance: Confused but alert, conversant   Eyes: anicteric sclerae, moist conjunctivae; no lid-lag; PERRLA  HENT: Atraumatic; oropharynx clear with moist mucous membranes and no mucosal ulcerations; normal hard and soft palate  Neck: Trachea midline; FROM, supple, no thyromegaly or lymphadenopathy  Lungs: no expiratory wheeze no rhonchi, with normal respiratory effort and no intercostal retractions  CV: RRR, no rub  Abdomen: Soft, non-tender; no masses or HSM  Extremities: Trace bilateral peripheral edema or extremity lymphadenopathy  Skin: Normal temperature, turgor and texture; no rash, ulcers or subcutaneous nodules  Psych: Confused affect, not alert and oriented    Data Review:  Notable Labs:  Results from last 7 days   Lab Units 08/05/20  0758 08/04/20  0815 08/02/20  0711 08/01/20  0358 07/31/20  0616   WBC 10*3/mm3 12.31* 14.29* 10.43 13.93* 8.53   HEMOGLOBIN g/dL 12.4 11.7* 10.4* 11.9* 11.6*   PLATELETS 10*3/mm3 279 " 238 218 239 194     Results from last 7 days   Lab Units 08/05/20  0758 08/02/20  0711 08/01/20  0358 07/31/20  0616   SODIUM mmol/L 139 143 141 142   POTASSIUM mmol/L 4.1 3.5 3.8 2.7*   CHLORIDE mmol/L 102 105 102 100   CO2 mmol/L 27.1 30.3* 27.4 27.2   BUN mg/dL 25* 19 20 11   CREATININE mg/dL 0.70 0.70 0.69 0.58   GLUCOSE mg/dL 91 96 106* 154*   CALCIUM mg/dL 9.3 9.5 10.2 9.3   MAGNESIUM mg/dL  --   --   --  1.9   PHOSPHORUS mg/dL  --   --   --  2.9   Estimated Creatinine Clearance: 40.5 mL/min (by C-G formula based on SCr of 0.7 mg/dL).    Results from last 7 days   Lab Units 08/05/20  0758 08/04/20  0815 08/02/20  0711   FERRITIN ng/mL  --  416.00*  --    CRP mg/dL  --  2.65*  --    PLATELETS 10*3/mm3 279 238 218             Imaging:  Reviewed chest images personally from past 3 days    Scheduled meds:      aspirin 81 mg Oral Daily   budesonide-formoterol 2 puff Inhalation BID - RT   dexamethasone 6 mg Oral Daily With Breakfast   dilTIAZem  mg Oral Q24H   furosemide 20 mg Oral Daily   levothyroxine 112 mcg Oral Daily   losartan 100 mg Oral Q24H   multivitamin with minerals 1 tablet Oral Daily   OLANZapine 5 mg Intramuscular Once   OLANZapine 10 mg Oral Daily Before Supper   pantoprazole 40 mg Oral QAM   polyethylene glycol 17 g Oral Daily   potassium chloride 20 mEq Oral Daily   senna 2 tablet Oral BID   tiotropium bromide monohydrate 2 puff Inhalation Daily - RT   traZODone 100 mg Oral Nightly       ASSESSMENT  /  PLAN:  1. Acute respiratory failure, hypoxia  2. Bilateral pulmonary infiltrates, R>L  3. Dysphagia with pills only previously  4. Acute systolic and diastolic CHF  5. Acute lactic acidosis  6. Hx RA  7. Chronic prednisone 5 mg  8. Asthma exacerbation  9. Diuretic induced hypokalemia     PLAN:  Difficult to determine exact right answer for her steroids.  She has covid and she is hypoxic so will continue dexamethasone for 10 day course.  Impossible to exactly determine when she got covid, and if  it is affecting her hypoxia.      Completed 7 days of antibiotic    Oxygen requirements stable    Continue with a dysphagia diet    Patient seen and examined using gown, double gloves, appropriate mask and goggles as per current COVID PPE CDC guidelines.      Rodrigo Gonzalez MD  [facility]  08/05/20  622pm

## 2020-08-05 NOTE — PLAN OF CARE
Pt resting comfortably w/o complaints remains confused. Repositioned as tolerated. O2 sats remain in the 90's on 2L. No s/s of distress.. Cont to monitor.

## 2020-08-05 NOTE — PROGRESS NOTES
LOS: 11 days     Chief Complaint:  COVID    Interval History: Afebrile, respiratory status improved and the patient is satting 100% on 2 L nasal cannula.  Remains confused.  No diarrhea no rashes no vomiting    Vital Signs  Temp:  [97.2 °F (36.2 °C)-98.3 °F (36.8 °C)] 97.2 °F (36.2 °C)  Heart Rate:  [59-88] 88  Resp:  [18-20] 20  BP: (117-176)/(83-88) 176/87  100% on 2 L nasal cannula    Physical Exam:  General: In no acute distress  Cardiovascular: RRR, no lower extremity edema  Respiratory: Crackles at the bases bilaterally  GI: Soft, NT/ND, + bowel sounds bilaterally  Skin: No rashes    Antibiotics:  None     Results Review:    Lab Results   Component Value Date    WBC 12.31 (H) 08/05/2020    HGB 12.4 08/05/2020    HCT 37.8 08/05/2020    MCV 95.5 08/05/2020     08/05/2020     Lab Results   Component Value Date    GLUCOSE 91 08/05/2020    BUN 25 (H) 08/05/2020    CREATININE 0.70 08/05/2020    EGFRIFNONA 79 08/05/2020    BCR 35.7 (H) 08/05/2020    CO2 27.1 08/05/2020    CALCIUM 9.3 08/05/2020    ALBUMIN 3.10 (L) 07/31/2020    LABIL2 1.4 07/06/2018    AST 18 07/27/2020    ALT 13 07/27/2020     Ferritin 416  CRP 2.65    Microbiology:  8/3 COVID +  7/26 MRSA positive  7/25 BCx Neg x 2  7/25 RVP/COVID neg    Assessment/Plan   COVID pneumonia  Acute hypoxic respiratory failure -improving  Systolic CHF  Vascular dementia complicating above  Right-sided bacterial pneumonia    Dexamethasone initiated on August 3    Patient's respiratory status continues to improve.  At this time there is no indication for additional experimental COVID therapy.  Continue to monitor    ID will sign off.  Please do not hesitate to call us if the patient shows any signs of clinical worsening at which point we can evaluate her again.

## 2020-08-05 NOTE — PROGRESS NOTES
Name: Yeni Jimenez ADMIT: 2020   : 1932  PCP: Arian Moyer MD    MRN: [id number] LOS: 11 days   AGE/SEX: 87 y.o. female  ROOM: [facility]     Subjective   Subjective   CC: dyspnea  No acute events. Patient has no new complaints but unfortunately and unsurprisingly has been more confused. Taking PO. No fevers or chills.    Objective   Objective   Vital Signs  Temp:  [97.2 °F (36.2 °C)-98.6 °F (37 °C)] 98.6 °F (37 °C)  Heart Rate:  [59-88] 84  Resp:  [20] 20  BP: (120-176)/(83-87) 176/87  SpO2:  [94 %-100 %] 100 %  on  Flow (L/min):  [2-3] 2;   Device (Oxygen Therapy): nasal cannula  Body mass index is 24.08 kg/m².  Physical Exam   Constitutional: No distress.   HENT:   Head: Normocephalic and atraumatic.   Mouth/Throat: Oropharynx is clear and moist.   Eyes: Pupils are equal, round, and reactive to light. Conjunctivae and EOM are normal.   Neck: Normal range of motion. Neck supple. No JVD present.   Cardiovascular: Normal rate, regular rhythm and intact distal pulses.   Pulmonary/Chest: Effort normal. She has decreased breath sounds in the right lower field and the left lower field. She has no wheezes.   Abdominal: Soft. Bowel sounds are normal.   Musculoskeletal: She exhibits no edema or tenderness.   Neurological: She is alert. No cranial nerve deficit. She exhibits normal muscle tone.   Skin: Skin is warm and dry. Capillary refill takes less than 2 seconds. She is not diaphoretic.   Psychiatric: Her speech is normal. Cognition and memory are impaired.   Nursing note and vitals reviewed.    Results Review:       I reviewed the patient's new clinical results.  I reviewed the patient's telemetry.  Results from last 7 days   Lab Units 20  0758 20  0815 20  0711 20  0358   WBC 10*3/mm3 12.31* 14.29* 10.43 13.93*   HEMOGLOBIN g/dL 12.4 11.7* 10.4* 11.9*   PLATELETS 10*3/mm3 279 238 218 239     Results from last 7 days   Lab Units 20  0758 20  0711  08/01/20  0358 07/31/20  0616   SODIUM mmol/L 139 143 141 142   POTASSIUM mmol/L 4.1 3.5 3.8 2.7*   CHLORIDE mmol/L 102 105 102 100   CO2 mmol/L 27.1 30.3* 27.4 27.2   BUN mg/dL 25* 19 20 11   CREATININE mg/dL 0.70 0.70 0.69 0.58   GLUCOSE mg/dL 91 96 106* 154*   Estimated Creatinine Clearance: 40.5 mL/min (by C-G formula based on SCr of 0.7 mg/dL).  Results from last 7 days   Lab Units 07/31/20  0616   ALBUMIN g/dL 3.10*     Results from last 7 days   Lab Units 08/05/20  0758 08/02/20  0711 08/01/20  0358 07/31/20  0616   CALCIUM mg/dL 9.3 9.5 10.2 9.3   ALBUMIN g/dL  --   --   --  3.10*   MAGNESIUM mg/dL  --   --   --  1.9   PHOSPHORUS mg/dL  --   --   --  2.9         Results from last 7 days   Lab Units 08/03/20  1314   COVID19  Detected*       No results found for: HGBA1C, POCGLU    Adult Transthoracic Echo Complete W/ Cont if Necessary Per Protocol  · Left ventricular systolic function is moderately decreased. Calculated   EF = 37.0%. Estimated EF was in disagreement with the calculated EF.   Estimated EF appears to be in the range of 41 - 45%. The estimated   ejection fraction was higher than the calculated ejection fraction.   Definity was indicated however patient refused..  · Normal left ventricular cavity size noted. Wall motion abnormalities are   noted below. Left ventricular wall thickness is consistent with   mild-to-moderate concentric hypertrophy. Septal wall motion is abnormal.  · Mild aortic valve regurgitation is present.  · Mild MAC is present. There is mild bileaflet mitral valve thickening   present. Mild-to-moderate mitral valve regurgitation is present. No   significant mitral valve stenosis is present.  · Mild to moderate tricuspid valve regurgitation is present. Estimated   right ventricular systolic pressure from tricuspid regurgitation is mildly   elevated (35-45 mmHg). Calculated right ventricular systolic pressure from   tricuspid regurgitation is 41 mmHg.           aspirin 81 mg Oral  Daily   budesonide-formoterol 2 puff Inhalation BID - RT   dexamethasone 6 mg Oral Daily With Breakfast   dilTIAZem  mg Oral Q24H   furosemide 20 mg Oral Daily   levothyroxine 112 mcg Oral Daily   losartan 100 mg Oral Q24H   multivitamin with minerals 1 tablet Oral Daily   OLANZapine 5 mg Intramuscular Once   OLANZapine 10 mg Oral Daily Before Supper   pantoprazole 40 mg Oral QAM   polyethylene glycol 17 g Oral Daily   potassium chloride 20 mEq Oral Daily   senna 2 tablet Oral BID   tiotropium bromide monohydrate 2 puff Inhalation Daily - RT   traZODone 100 mg Oral Nightly      Diet Dysphagia; IV - Mechanical Soft No Mixed Consistencies; Nectar / Syrup Thick       Assessment/Plan     Active Hospital Problems    Diagnosis  POA   • **Acute respiratory failure with hypoxia (CMS/[facility]) [J96.01]  Yes   • Pneumonia due to COVID-19 virus [U07.1, J12.89]  No   • Aspiration pneumonia of right lung due to vomit (CMS/[facility]) [J69.0]  Yes   • Acute on chronic combined systolic and diastolic CHF (congestive heart failure) (CMS/[facility]) [I50.43]  Yes   • Asthma exacerbation [J45.901]  Yes   • Rheumatoid arthritis involving multiple sites (CMS/[facility]) [M06.9]  Yes   • Polypharmacy [Z79.899]  Not Applicable   • Elevated lactic acid level [R79.89]  Yes   • 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   Aspiration Pneumonia  - has bilateral infiltrates, worse on the right  - completed 7d course of augmentin with improvement in respiratory status  - pulmonary toilet    Asthma Exacerbation  - continue on steroids (prednisone changed to decadron with COVID-19 diagnosis) and nebulized bronchodilators  - on 10 mg prednisone daily at  home  - appreciate pulmonology recs    COVID-19 PNA  - admission test was negative, diagnosed after her , who has been with her consistently, was diagnosed  - continue on decadron  - appreciate ID recs, they have signed off    Hypothyroidism  - continue on levothyroxine    Multifocal Atrial Tachycardia  - now resolved  - on diltiazem  - appreciate cardiology recs    Acute on Chronic Systolic CHF  - volume status is improved after IV lasix  - on her home dose of lasix 20mg daily    SCDs for DVT prophylaxis.  Full code.  Discussed with patient, nursing staff, consulting provider and care team on multidisciplinary rounds.  Anticipate discharge TBD.  in 2-3 days.      Karri Simon MD  [facility]  08/05/20  19:18    Patient was wearing facemask when I entered the room and throughout our encounter.  I wore protective equipment throughout this patient encounter including a face mask, gloves and protective eyewear.  Hand hygiene was performed before donning protective equipment and after removal when leaving the room.

## 2020-08-05 NOTE — PLAN OF CARE
Pt resting in bed. No complaints of pain. Seems increasingly confused. VSS. No s/s of distress. Will continue to monitor.  Problem: Patient Care Overview  Goal: Plan of Care Review  Outcome: Ongoing (interventions implemented as appropriate)  Goal: Individualization and Mutuality  Outcome: Ongoing (interventions implemented as appropriate)  Goal: Discharge Needs Assessment  Outcome: Ongoing (interventions implemented as appropriate)  Goal: Interprofessional Rounds/Family Conf  Outcome: Ongoing (interventions implemented as appropriate)     Problem: Fall Risk (Adult)  Goal: Absence of Fall  Outcome: Ongoing (interventions implemented as appropriate)     Problem: Skin Injury Risk (Adult)  Goal: Skin Health and Integrity  Outcome: Ongoing (interventions implemented as appropriate)     Problem: Pain, Chronic (Adult)  Goal: Acceptable Pain/Comfort Level and Functional Ability  Outcome: Ongoing (interventions implemented as appropriate)     Problem: Pneumonia (Adult)  Goal: Signs and Symptoms of Listed Potential Problems Will be Absent, Minimized or Managed (Pneumonia)  Outcome: Ongoing (interventions implemented as appropriate)

## 2020-08-06 NOTE — PROGRESS NOTES
"  Daily Progress Note.   [address]  8/6/2020    Patient:  Name:  Yeni Jimenez  MRN:  [id number]  11/6/1932  87 y.o.  female         CC: Short of breath    Interval History:  Follow up ae of copd, pna  Patient is severely agitated she is looking out the window screaming.  She does not interact with me.  An attempt to listen to her are drastically hindered by her screaming and yelling.  Cannot redirect her.  She appears very frightened.  ROS: otherwise limited by altered mental status  PMFSSH: no change    Physical Exam:  /91 (BP Location: Left arm, Patient Position: Lying)   Pulse 94   Temp 97 °F (36.1 °C) (Oral)   Resp 20   Ht 154.9 cm (60.98\")   Wt 57.8 kg (127 lb 6.4 oz)   SpO2 92%   BMI 24.08 kg/m²   Body mass index is 24.08 kg/m².    Intake/Output Summary (Last 24 hours) at 8/6/2020 1728  Last data filed at 8/6/2020 1222  Gross per 24 hour   Intake 240 ml   Output --   Net 240 ml     General appearance: Confused but alert, yelling at window wont interact  Eyes: anicteric sclerae, moist conjunctivae; no lid-lag; PERRLA  HENT: Atraumatic; oropharynx clear with moist mucous membranes and no mucosal ulcerations; normal hard and soft palate  Neck: Trachea midline; FROM, supple, no thyromegaly or lymphadenopathy  Lungs: no expiratory wheeze no rhonchi, however patient yelling during exam  CV: RRR, no rub  Abdomen: Soft, non-tender; no masses or HSM  Extremities: Trace bilateral peripheral edema or extremity lymphadenopathy  Skin: Normal temperature, turgor and texture; no rash, ulcers or subcutaneous nodules  Psych: Confused affect, not alert and oriented agitated    Data Review:  Notable Labs:  Results from last 7 days   Lab Units 08/06/20  0622 08/05/20  0758 08/04/20  0815 08/02/20  0711 08/01/20  0358 07/31/20  0616   WBC 10*3/mm3 14.48* 12.31* 14.29* 10.43 13.93* 8.53   HEMOGLOBIN g/dL 12.8 12.4 11.7* 10.4* 11.9* 11.6*   PLATELETS 10*3/mm3 301 279 238 218 239 194     Results " from last 7 days   Lab Units 08/05/20  0758 08/02/20  0711 08/01/20  0358 07/31/20  0616   SODIUM mmol/L 139 143 141 142   POTASSIUM mmol/L 4.1 3.5 3.8 2.7*   CHLORIDE mmol/L 102 105 102 100   CO2 mmol/L 27.1 30.3* 27.4 27.2   BUN mg/dL 25* 19 20 11   CREATININE mg/dL 0.70 0.70 0.69 0.58   GLUCOSE mg/dL 91 96 106* 154*   CALCIUM mg/dL 9.3 9.5 10.2 9.3   MAGNESIUM mg/dL  --   --   --  1.9   PHOSPHORUS mg/dL  --   --   --  2.9   Estimated Creatinine Clearance: 40.5 mL/min (by C-G formula based on SCr of 0.7 mg/dL).    Results from last 7 days   Lab Units 08/06/20  0622 08/05/20  0758 08/04/20  0815   FERRITIN ng/mL  --   --  416.00*   CRP mg/dL  --   --  2.65*   PLATELETS 10*3/mm3 301 279 238             Imaging:  Reviewed chest images personally from past 3 days    Scheduled meds:      ALPRAZolam 1 mg Oral TID   aspirin 81 mg Oral Daily   budesonide-formoterol 2 puff Inhalation BID - RT   dexamethasone 6 mg Oral Daily With Breakfast   dilTIAZem  mg Oral Q24H   furosemide 20 mg Oral Daily   levothyroxine 112 mcg Oral Daily   losartan 100 mg Oral Q24H   multivitamin with minerals 1 tablet Oral Daily   OLANZapine 5 mg Intramuscular Once   OLANZapine 10 mg Oral Daily Before Supper   [START ON 8/7/2020] OLANZapine 2.5 mg Oral Daily   pantoprazole 40 mg Oral QAM   polyethylene glycol 17 g Oral Daily   potassium chloride 20 mEq Oral Daily   senna 2 tablet Oral BID   tiotropium bromide monohydrate 2 puff Inhalation Daily - RT   traZODone 100 mg Oral Nightly       ASSESSMENT  /  PLAN:  1. Acute respiratory failure, hypoxia  2. Bilateral pulmonary infiltrates, R>L  3. Dysphagia with pills only previously  4. Acute systolic and diastolic CHF  5. Acute lactic acidosis  6. Hx RA  7. Chronic prednisone 5 mg  8. Asthma exacerbation  9. Diuretic induced hypokalemia  10. Delirium vs steroid psychosis     Very concerned that her agitation may be worsened by her Decadron.  She has been stable on her oxygen requirements from prior  to her COVID diagnosis to after.  I think at this point her risk of hurting herself with confusion and agitation outweigh the benefits of Decadron at high doses.  And her ox requirements have not gone up since her diagnosis.  I favor transitioning over to prednisone 20 mg daily see if this helps    Pt takes higher dose benzodiazepine at home, Dr Chowdary going to trial restarting at home dose, we will monitor for over sedation.    Completed 7 days of antibiotic    Oxygen requirements stable    Continue with a dysphagia diet    Patient seen and examined using gown, double gloves, appropriate mask and goggles as per current COVID PPE CDC guidelines.    Coordinated care with Dr. Chowdary today    Rodrigo Gonzalez MD  [facility]  08/06/20  528pm

## 2020-08-06 NOTE — PROGRESS NOTES
Name: Yeni Jimenez ADMIT: 2020   : 1932  PCP: Arian Moyer MD    MRN: [id number] LOS: 12 days   AGE/SEX: 87 y.o. female  ROOM: [facility] ([place])   Subjective   Chief Complaint   Patient presents with   • Altered Mental Status   • Shortness of Breath     Still with confusion  Is on zyprexa from Psychiatry  Seems like things are better than a few days ago  On steroids now and chronically  Denies current dyspnea    ROS  Unreliable due to mental status    Objective   Vital Signs  Temp:  [97 °F (36.1 °C)-98.8 °F (37.1 °C)] 97 °F (36.1 °C)  Heart Rate:  [] 101  Resp:  [18-20] 20  BP: (127-159)/(82-92) 158/91  SpO2:  [88 %-97 %] 91 %  on  Flow (L/min):  [2] 2;   Device (Oxygen Therapy): room air  Body mass index is 24.08 kg/m².    Elderly  Chronically ill  Confused, hallucinating  Physical Exam   HENT:   Head: Normocephalic and atraumatic.   Eyes: Conjunctivae are normal. No scleral icterus.   Neck: Neck supple. No tracheal deviation present.   Cardiovascular: Normal rate and regular rhythm.   Pulmonary/Chest: Effort normal and breath sounds normal.   Abdominal: Soft. There is no tenderness. There is no guarding.   Neurological: She exhibits normal muscle tone.   Skin: Skin is warm and dry.       Results Review:       I reviewed the patient's new clinical results.  Results from last 7 days   Lab Units 20  0622 20  0758 20  0815 20  0711   WBC 10*3/mm3 14.48* 12.31* 14.29* 10.43   HEMOGLOBIN g/dL 12.8 12.4 11.7* 10.4*   PLATELETS 10*3/mm3 301 279 238 218     Results from last 7 days   Lab Units 20  0758 20  0711 20  0358 20  0616   SODIUM mmol/L 139 143 141 142   POTASSIUM mmol/L 4.1 3.5 3.8 2.7*   CHLORIDE mmol/L 102 105 102 100   CO2 mmol/L 27.1 30.3* 27.4 27.2   BUN mg/dL 25* 19 20 11   CREATININE mg/dL 0.70 0.70 0.69 0.58   GLUCOSE mg/dL 91 96 106* 154*   Estimated Creatinine Clearance: 40.5 mL/min (by C-G formula based on SCr of 0.7  mg/dL).  Results from last 7 days   Lab Units 07/31/20  0616   ALBUMIN g/dL 3.10*     Results from last 7 days   Lab Units 08/05/20  0758 08/02/20  0711 08/01/20  0358 07/31/20  0616   CALCIUM mg/dL 9.3 9.5 10.2 9.3   ALBUMIN g/dL  --   --   --  3.10*   MAGNESIUM mg/dL  --   --   --  1.9   PHOSPHORUS mg/dL  --   --   --  2.9         Coag     HbA1C No results found for: HGBA1C  Infection     Radiology(recent) No radiology results for the last day  No results found for: TROPONINT, TROPONINI, BNP  No components found for: TSH;2      aspirin 81 mg Oral Daily   budesonide-formoterol 2 puff Inhalation BID - RT   dexamethasone 6 mg Oral Daily With Breakfast   dilTIAZem  mg Oral Q24H   furosemide 20 mg Oral Daily   levothyroxine 112 mcg Oral Daily   losartan 100 mg Oral Q24H   multivitamin with minerals 1 tablet Oral Daily   OLANZapine 5 mg Intramuscular Once   OLANZapine 10 mg Oral Daily Before Supper   pantoprazole 40 mg Oral QAM   polyethylene glycol 17 g Oral Daily   potassium chloride 20 mEq Oral Daily   senna 2 tablet Oral BID   tiotropium bromide monohydrate 2 puff Inhalation Daily - RT   traZODone 100 mg Oral Nightly      Diet Dysphagia; IV - Mechanical Soft No Mixed Consistencies; Nectar / Syrup Thick      Assessment/Plan      Active Hospital Problems    Diagnosis  POA   • **Acute respiratory failure with hypoxia (CMS/[facility]) [J96.01]  Yes   • Pneumonia due to COVID-19 virus [U07.1, J12.89]  No   • Aspiration pneumonia of right lung due to vomit (CMS/[facility]) [J69.0]  Yes   • Acute on chronic combined systolic and diastolic CHF (congestive heart failure) (CMS/[facility]) [I50.43]  Yes   • Asthma exacerbation [J45.901]  Yes   • Rheumatoid arthritis involving multiple sites (CMS/[facility]) [M06.9]  Yes   • Polypharmacy [Z79.899]  Not Applicable   • Elevated lactic acid level [R79.89]  Yes   • 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       Aspiration Pneumonia  - has bilateral infiltrates, worse on the right  - completed 7d course of augmentin with improvement in respiratory status  - pulmonary toilet     Asthma Exacerbation  - continue on steroids (prednisone changed to decadron with COVID-19 diagnosis) and nebulized bronchodilators  - on 10 mg prednisone daily at home  - appreciate pulmonology recs     COVID-19 PNA  - admission test was negative, diagnosed after her , who has been with her consistently, was diagnosed  - continue on decadron  - appreciate ID recs, they have signed off     Hypothyroidism  - continue on levothyroxine     Multifocal Atrial Tachycardia  - now resolved  - on diltiazem  - appreciate cardiology recs     Acute on Chronic Systolic CHF  - volume status is improved after IV lasix  - on her home dose of lasix 20mg daily     Metabolic Encephalopathy/Delrium  -Placed on Zyprexa per Psychiatry  -Will add a low dose in the morning to help with hallucinations but hopefully avoid to much drowsiness.   -Reviewed her Toño- She is prescribed xanax 1mg 120pills/month. She is getting less than that here- seems to be 0.5mg 1-2 x/day prn. While I would not recommend that dosing for a patient her age there could be an element of w/d going on so I will scheduled 1mg TID and see if that improves symptoms.    SCDs for DVT prophylaxis.  Full code.  Discussed with patient, nursing staff, consulting provider and care team on multidisciplinary rounds.  Anticipate discharge TBD.  in 2-3 days.      DW RN  Greater than 36 minutes spent with greater than 50% counseling and coordinating care      Cameron Chowdary MD  [facility]  08/06/20  4:15 PM

## 2020-08-06 NOTE — PLAN OF CARE
Pt restless a lot of the evening. O2 sats 90's or better on 2L NC. Cont to be confused not easily oriented. No c/o voiced. VSS cont to monitor.

## 2020-08-06 NOTE — PLAN OF CARE
Patient confused and, at times, having visual hallucinations.  Patient mood labile.  VSS.  Patient tolerating room air at times she has removed her nasal cannula

## 2020-08-06 NOTE — NURSING NOTE
After refusing breakfast and spitting out most of her morning medications, Patient drank some gatorade and agreed to take some medication with some pudding.

## 2020-08-07 NOTE — PLAN OF CARE
Pt confused and uncooperative at times refusing to take meds. Frequently seeks out staff. Very demanding. Will continue to monitor pt

## 2020-08-07 NOTE — PROGRESS NOTES
Name: Yeni Jimenez ADMIT: 2020   : 1932  PCP: Arian Moyer MD    MRN: [id number] LOS: 13 days   AGE/SEX: 87 y.o. female  ROOM: [facility]   Subjective   Chief Complaint   Patient presents with   • Altered Mental Status   • Shortness of Breath     Still with confusion  Is on zyprexa from Psychiatry  On steroids now and chronically  On xanax as outpatient - 1mg QID PRN    ROS  Unreliable due to mental status    Objective   Vital Signs  Temp:  [96.9 °F (36.1 °C)-98.1 °F (36.7 °C)] 96.9 °F (36.1 °C)  Heart Rate:  [] 95  Resp:  [20] 20  BP: (123-158)/(61-96) 143/96  SpO2:  [90 %-98 %] 92 %  on  Flow (L/min):  [1-2] 1;   Device (Oxygen Therapy): nasal cannula  Body mass index is 24.18 kg/m².    Elderly  Chronically ill  Confused, sleepy  Physical Exam   HENT:   Head: Normocephalic and atraumatic.   Eyes: Conjunctivae are normal. No scleral icterus.   Neck: Neck supple. No tracheal deviation present.   Cardiovascular: Normal rate and regular rhythm.   Pulmonary/Chest: Effort normal and breath sounds normal.   Decreased bs at bases   Abdominal: Soft. There is no tenderness. There is no guarding.   Neurological: She exhibits normal muscle tone.   Skin: Skin is warm and dry.       Results Review:       I reviewed the patient's new clinical results.  Results from last 7 days   Lab Units 20  0551 20  0622 20  0758 20  0815   WBC 10*3/mm3 14.20* 14.48* 12.31* 14.29*   HEMOGLOBIN g/dL 11.5* 12.8 12.4 11.7*   PLATELETS 10*3/mm3 221 301 279 238     Results from last 7 days   Lab Units 20  0758 20  0711 20  0358   SODIUM mmol/L 139 143 141   POTASSIUM mmol/L 4.1 3.5 3.8   CHLORIDE mmol/L 102 105 102   CO2 mmol/L 27.1 30.3* 27.4   BUN mg/dL 25* 19 20   CREATININE mg/dL 0.70 0.70 0.69   GLUCOSE mg/dL 91 96 106*   Estimated Creatinine Clearance: 40.6 mL/min (by C-G formula based on SCr of 0.7 mg/dL).      Results from last 7 days   Lab Units 20  7623  08/02/20  0711 08/01/20  0358   CALCIUM mg/dL 9.3 9.5 10.2         Coag     HbA1C No results found for: HGBA1C  Infection     Radiology(recent) No radiology results for the last day  No results found for: TROPONINT, TROPONINI, BNP  No components found for: TSH;2      ALPRAZolam 1 mg Oral TID   aspirin 81 mg Oral Daily   budesonide-formoterol 2 puff Inhalation BID - RT   dilTIAZem  mg Oral Q24H   furosemide 20 mg Oral Daily   levothyroxine 112 mcg Oral Daily   losartan 100 mg Oral Q24H   multivitamin with minerals 1 tablet Oral Daily   OLANZapine 5 mg Intramuscular Once   OLANZapine 10 mg Oral Daily Before Supper   OLANZapine 2.5 mg Oral Daily   pantoprazole 40 mg Oral QAM   polyethylene glycol 17 g Oral Daily   potassium chloride 20 mEq Oral Daily   predniSONE 20 mg Oral Daily With Breakfast   senna 2 tablet Oral BID   tiotropium bromide monohydrate 2 puff Inhalation Daily - RT   traZODone 100 mg Oral Nightly      Diet Dysphagia; IV - Mechanical Soft No Mixed Consistencies; Nectar / Syrup Thick      Assessment/Plan      Active Hospital Problems    Diagnosis  POA   • **Acute respiratory failure with hypoxia (CMS/HCC) [J96.01]  Yes   • Pneumonia due to COVID-19 virus [U07.1, J12.89]  No   • Aspiration pneumonia of right lung due to vomit (CMS/[facility]) [J69.0]  Yes   • Acute on chronic combined systolic and diastolic CHF (congestive heart failure) (CMS/[facility]) [I50.43]  Yes   • Asthma exacerbation [J45.901]  Yes   • Rheumatoid arthritis involving multiple sites (CMS/[facility]) [M06.9]  Yes   • Polypharmacy [Z79.899]  Not Applicable   • Elevated lactic acid level [R79.89]  Yes   • 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       Aspiration Pneumonia  - has bilateral infiltrates, worse on the right  - completed 7d course of augmentin with improvement in respiratory status  - pulmonary toilet     Asthma Exacerbation  - continue on steroids (prednisone changed to decadron with COVID-19 diagnosis) and nebulized bronchodilators  - on 10 mg prednisone daily at home  - appreciate pulmonology recs     COVID-19 PNA  - admission test was negative, diagnosed after her , who has been with her consistently, was diagnosed  - continue on steroids which have been adjusted by Pulmonary  - Sometimes on RA, sometimes on oxygen  - appreciate ID recs, they have signed off     Hypothyroidism  - continue on levothyroxine     Multifocal Atrial Tachycardia  - now resolved  - on diltiazem  - appreciate cardiology recs     Acute on Chronic Systolic CHF  - volume status is improved after IV lasix  - on her home dose of lasix 20mg daily     Metabolic Encephalopathy/Delrium  -Placed on Zyprexa per Psychiatry  -added a low dose in the morning to help with hallucinations but hopefully avoid to much drowsiness.   -Reviewed her Toño- She is prescribed xanax 1mg 120pills/month. She is getting less than that here- seems to be 0.5mg 1-2 x/day prn. While I would not recommend that dosing for a patient her age there could be an element of w/d going on so I scheduled 1mg TID and see if that improves symptoms.    Dysphagia  On modified diet    SCDs for DVT prophylaxis.  Discussed with patient, nursing staff, consulting provider and care team on multidisciplinary rounds.  Anticipate discharge TBD.  in 2-3 days. Likely home with HH when she is more stable- I think he confusion would be better at home than the hospital.       DW RN  DW Pts   Greater than 37 minutes spent with greater than 50% counseling and coordinating care      Cameron Chowdary MD  [facility]  08/07/20  4:15 PM

## 2020-08-07 NOTE — PLAN OF CARE
Problem: Patient Care Overview  Goal: Plan of Care Review  Outcome: Ongoing (interventions implemented as appropriate)  Flowsheets  Taken 8/5/2020 0442 by Kelsi Gabriel RN  Progress: improving  Taken 8/7/2020 1627 by Enid Coley PT  Plan of Care Reviewed With: patient  Outcome Summary: Pt with intermittent confusion. She was c/o LBP and agreed to sit up at EOB. She required Mod A for bed mobility and CG/Min A for sitting balance at EOB. She was unable to transition to standing due to increased pain at R knee with attempt to stand. Pt asked for Tylenol, RN notified. Cont with skilled PT for strength and functional mobility. May benefit from SNF dependent on progress with PT.   ..Patient was intermittently wearing a face mask during this therapy encounter. Therapist used appropriate personal protective equipment including gown, eye protection, mask and gloves.  Mask used was standard procedure mask. Appropriate PPE was worn during the entire therapy session. Hand hygiene was completed before and after therapy session. Patient is in enhanced droplet precautions.

## 2020-08-07 NOTE — PROGRESS NOTES
Continued Stay Note  [facility]     Patient Name: Yeni Jimenez  MRN: [id number]  Today's Date: 8/7/2020    Admit Date: 7/25/2020    Discharge Plan     Row Name 08/07/20 0850       Plan    Plan  Plan home with spouse, private caregivers and [facility].  CARLITOS Robbins RN    Patient/Family in Agreement with Plan  yes    Plan Comments  Pt is COVID ISOLATION. Spoke to pt's spouse  (Dr Jimenez) who is also a COVID pt on this unit.  Per Dr. Jimenez and pt's daughter ( Nikki Hopkins [phone]) plan continues to be home with spouse, private caregivers and [facility].  CCP will assist with equipment needs.  Plan home with spouse, private caregivers and [facility].   CARLITOS Robbins RN        Discharge Codes    No documentation.             Lynda Robbins, RN

## 2020-08-07 NOTE — THERAPY TREATMENT NOTE
Patient Name: Yeni Jimenez  : 1932    MRN: [id number]                              Today's Date: 2020       Admit Date: 2020    Visit Dx:     ICD-10-CM ICD-9-CM   1. Community acquired pneumonia of right upper lobe of lung J18.9 486   2. Elevated lactic acid level R79.89 276.2   3. Dementia without behavioral disturbance, unspecified dementia type (CMS/HCC) F03.90 294.20     Patient Active Problem List   Diagnosis   • Hypokalemia   • Rheumatoid arthritis (CMS/HCC)   • Essential hypertension   • Hypocalcemia   • Generalized weakness   • Hypomagnesemia   • HCAP (healthcare-associated pneumonia)   • Hypophosphatemia   • Sepsis due to pneumonia (CMS/[facility])   • Pneumonia due to gram-negative bacteria (CMS/[facility])   • Altered mental status   • Acute pain of left knee   • Hypothyroidism (acquired)   • Osteoarthritis   • Chronic back pain   • Anxiety disorder   • Community acquired pneumonia of right upper lobe of lung   • Rheumatoid arthritis involving multiple sites (CMS/[facility])   • Polypharmacy   • Elevated lactic acid level   • 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[facility])   • Acute respiratory failure with hypoxia (CMS/[facility])   • Asthma exacerbation   • Aspiration pneumonia of right lung due to vomit (CMS/[facility])   • Acute on chronic combined systolic and diastolic CHF (congestive heart failure) (CMS/[facility])   • Pneumonia due to COVID-19 virus     Past Medical History:   Diagnosis Date   • Anxiety    • Arthritis    • Chronic back pain    • Disease of thyroid gland    • Hypertension    • Hypothyroidism    • Left bundle branch block    • Osteoarthritis    • Osteoporosis    • Palpitation    • Rheumatoid arthritis (CMS/HCC)    • 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  yes  -     Existing Precautions/Restrictions  fall;oxygen therapy device and L/min  -     Row Name 08/07/20 1620          Cognitive Assessment/Intervention- PT/OT    Orientation Status (Cognition)  oriented to;person  -MW     Cognitive Assessment/Intervention Comment  Pt confused, frequently asks for help   -     Row Name 08/07/20 1620          Safety Issues, Functional Mobility    Impairments Affecting Function (Mobility)  balance;endurance/activity tolerance;strength;cognition;postural/trunk control;range of motion (ROM);pain  -     Comment, Safety Issues/Impairments (Mobility)  Gait belt used for safety.  -       User Key  (r) = Recorded By, (t) = Taken By, (c) = Cosigned By    Initials Name Provider Type    MW Enid Coley, PT Physical Therapist        Mobility     Row Name 08/07/20 1621          Bed Mobility Assessment/Treatment    Bed Mobility Assessment/Treatment  rolling left;rolling right;supine-sit-supine  -     Rolling Left [place] (Bed Mobility)  moderate assist (50% patient effort);verbal cues;nonverbal cues (demo/gesture)  -MW     Rolling Right [place] (Bed Mobility)  verbal cues;nonverbal cues (demo/gesture);moderate assist (50% patient effort)  -MW     Supine-Sit-Supine [place] (Bed Mobility)  moderate assist (50% patient effort);2 person assist;verbal cues;nonverbal cues (demo/gesture)  -     Assistive Device (Bed Mobility)  bed rails;head of bed elevated;draw sheet  -     Comment (Bed Mobility)  Had initial difficulty with sitting balance while EOB due to loss of balance  -     Row Name 08/07/20 1621          Transfer Assessment/Treatment    Comment (Transfers)  Attempted STS with left hand at bed rail and right HHA, was able to lean forward to WB at BLE but she discontinued further attempts to stand due  to c/o pain at R knee  -MW     Row Name 08/07/20 1621          Sit-Stand Transfer    Sit-Stand [place] (Transfers)  other (see comments)  -     Row Name 08/07/20 1621          Gait/Stairs Assessment/Training    [place] Level (Gait)  unable to assess  -MW     Comment (Gait/Stairs)  Unable to stand or ambulate due to c/o increased pain, pt kept asking to lie down so that she could take a nap  -MW       User Key  (r) = Recorded By, (t) = Taken By, (c) = Cosigned By    Initials Name Provider Type    Enid Correa PT Physical Therapist        Obj/Interventions     Row Name 08/07/20 1626          Static Sitting Balance    Level of [place] (Unsupported Sitting, Static Balance)  minimal assist, 75% patient effort;contact guard assist  -MW     Sitting Position (Unsupported Sitting, Static Balance)  sitting on edge of bed  -MW     Time Able to Maintain Position (Unsupported Sitting, Static Balance)  4 to 5 minutes  -MW     Comment (Unsupported Sitting, Static Balance)  Verbal cues to place hands down on bed to help with balance; able to sit without suppport for no more than 15 sec  -MW       User Key  (r) = Recorded By, (t) = Taken By, (c) = Cosigned By    Initials Name Provider Type    Enid Correa PT Physical Therapist        Goals/Plan     Row Name 08/07/20 [facility]          Bed Mobility Goal 1 (PT)    [place] Level/Cues Needed (Bed Mobility Goal 1, PT)  minimum assist (75% or more patient effort)  -MW     Time Frame (Bed Mobility Goal 1, PT)  1 week  -MW     Progress/Outcomes (Bed Mobility Goal 1, PT)  progress slower than expected;goal revised this date  -     Row Name 08/07/20 [facility]          Transfer Goal 1 (PT)    [place] Level/Cues Needed (Transfer Goal 1, PT)  minimum assist (75% or more patient effort)  -MW     Time Frame (Transfer Goal 1, PT)  1 week  -MW     Progress/Outcome (Transfer Goal 1, PT)  progress slower than expected;goal revised this date  -[facility] Name 08/07/20  1640          Gait Training Goal 1 (PT)    Activity/Assistive Device (Gait Training Goal 1, PT)  gait (walking locomotion);walker, rolling  -MW     [place] Level (Gait Training Goal 1, PT)  moderate assist (50-74% patient effort);maximum assist (25-49% patient effort)  -MW     Distance (Gait Goal 1, PT)  10  -MW     Progress/Outcome (Gait Training Goal 1, PT)  goal revised this date;progress slower than expected  -MW       User Key  (r) = Recorded By, (t) = Taken By, (c) = Cosigned By    Initials Name Provider Type    MW Enid Coley, PT Physical Therapist        Clinical Impression     Row Name 08/07/20 1628          Pain Assessment    Additional Documentation  Pain Scale: Numbers Pre/Post-Treatment (Group)  -     Row Name 08/07/20 6417          Pain Scale: Numbers Pre/Post-Treatment    Pain Scale: Numbers, Pretreatment  9/10  -MW     Pain Scale: Numbers, Post-Treatment  9/10  -MW     Pain Location  (S) back back and R knee when attempting STS  -MW     Pre/Post Treatment Pain Comment  Pt asked for Tylenol at end of session, RN notified  -     Pain Intervention(s)  Repositioned;Rest  -     Row Name 08/07/20 3911          Plan of Care Review    Plan of Care Reviewed With  patient  -MW     Outcome Summary  Pt with intermittent confusion. She was c/o LBP and agreed to sit up at EOB. She required Mod A for bed mobility and CG/Min A for sitting balance at EOB. She was unable to transition to standing due to increased pain at R knee with attempt to stand. Pt asked for Tylenol, RN notified. Cont with skilled PT for strength and functional mobility. May benefit from SNF dependent on progress with PT.   -     Row Name 08/07/20 1622          Vital Signs    O2 Delivery Pre Treatment  supplemental O2  -MW     O2 Delivery Intra Treatment  supplemental O2  -MW     Post SpO2 (%)  91  -MW     O2 Delivery Post Treatment  supplemental O2  -MW     Pre Patient Position  Supine  -MW     Intra Patient Position  Sitting   -MW     Post Patient Position  Supine  -MW     Row Name 08/07/20 1627          Positioning and Restraints    Pre-Treatment Position  in bed  -MW     Post Treatment Position  bed  -MW     In Bed  notified nsg;supine;call light within reach;encouraged to call for assist;exit alarm on pillow under both legs  -MW       User Key  (r) = Recorded By, (t) = Taken By, (c) = Cosigned By    Initials Name Provider Type    Enid Correa PT Physical Therapist        Outcome Measures     Row Name 08/07/20 1632          How much help from another person do you currently need...    Turning from your back to your side while in flat bed without using bedrails?  2  -MW     Moving from lying on back to sitting on the side of a flat bed without bedrails?  2  -MW     Moving to and from a bed to a chair (including a wheelchair)?  2  -MW     Standing up from a chair using your arms (e.g., wheelchair, bedside chair)?  2  -MW     Climbing 3-5 steps with a railing?  1  -MW     To walk in hospital room?  1  -MW     AM-PAC 6 Clicks Score (PT)  10  -MW     Row Name 08/07/20 1632          Functional Assessment    Outcome Measure Options  AM-PAC 6 Clicks Basic Mobility (PT)  -       User Key  (r) = Recorded By, (t) = Taken By, (c) = Cosigned By    Initials Name Provider Type    Enid Crorea PT Physical Therapist        Physical Therapy Education                 Title: PT OT SLP Therapies (In Progress)     Topic: Physical Therapy (In Progress)     Point: Mobility training (In Progress)     Description:   Instruct learner(s) on safety and technique for assisting patient out of bed, chair or wheelchair.  Instruct in the proper use of assistive devices, such as walker, crutches, cane or brace.              Patient Friendly Description:   It's important to get you on your feet again, but we need to do so in a way that is safe for you. Falling has serious consequences, and your personal safety is the most important thing of all.        When  it's time to get out of bed, one of us or a family member will sit next to you on the bed to give you support.     If your doctor or nurse tells you to use a walker, crutches, a cane, or a brace, be sure you use it every time you get out of bed, even if you think you don't need it.    Learning Progress Summary           Patient Acceptance, E, VU,NR by  at 8/7/2020 1633    Comment:  Safety with mobility    Acceptance, E, NL by RB at 8/6/2020 1132    Acceptance, E, VU by RB at 8/6/2020 1056    Acceptance, E,TB, VU by AA at 8/5/2020 1507    Acceptance, E,TB, VU by AA at 8/4/2020 1621    Acceptance, E,D, NR,NL by NARDA at 8/3/2020 0956    Acceptance, E,D, NR,NL by RAHEEL at 8/1/2020 1327    Acceptance, E,D, NR by RAHEEL at 7/30/2020 1737    Acceptance, E,TB,D, VU,NR by  at 7/27/2020 1237   Family Acceptance, E,D, NR by RAHEEL at 7/30/2020 1737    Acceptance, E,TB, VU by STEPHANIE at 7/28/2020 1904   Caregiver Acceptance, E,D, NR,NL by RAHEEL at 8/1/2020 1327    Acceptance, E,D, NR by RAHEEL at 7/30/2020 1737                   Point: Home exercise program (In Progress)     Description:   Instruct learner(s) on appropriate technique for monitoring, assisting and/or progressing patient with therapeutic exercises and activities.              Learning Progress Summary           Patient Acceptance, E, NL by RB at 8/6/2020 1132    Acceptance, E, VU by RB at 8/6/2020 1056    Acceptance, E,TB, VU by AA at 8/5/2020 1507    Acceptance, E,TB, VU by AA at 8/4/2020 1621    Acceptance, E,D, NR,NL by PH at 8/3/2020 0956    Acceptance, E,D, NR,NL by RAHEEL at 8/1/2020 1327    Acceptance, E,D, NR by RAHEEL at 7/30/2020 1737    Acceptance, E,TB,D, VU,NR by  at 7/27/2020 1237   Family Acceptance, E,D, NR by RAHEEL at 7/30/2020 1737    Acceptance, ALIXTB, VU by STEPHANIE at 7/28/2020 1904   Caregiver Acceptance, TARIQ THOMSON, NR,NL by RAHEEL at 8/1/2020 1327    Acceptance, TARIQ THOMSON, NR by RAHEEL at 7/30/2020 1737                   Point: Body mechanics (In Progress)     Description:   Instruct learner(s)  on proper positioning and spine alignment for patient and/or caregiver during mobility tasks and/or exercises.              Learning Progress Summary           Patient Acceptance, E, VU,NR by GRACIE at 8/7/2020 1633    Comment:  Safety with mobility    Acceptance, E, NL by RB at 8/6/2020 1132    Acceptance, E, VU by RB at 8/6/2020 1056    Acceptance, E,TB, VU by AA at 8/5/2020 1507    Acceptance, E,TB, VU by AA at 8/4/2020 1621    Acceptance, E,D, NR,NL by PH at 8/3/2020 0956    Acceptance, E,D, NR,NL by RAHEEL at 8/1/2020 1327    Acceptance, E,D, NR by RAHEEL at 7/30/2020 1737    Acceptance, E,TB,D, VU,NR by RAE at 7/27/2020 1237   Family Acceptance, E,D, NR by RAHEEL at 7/30/2020 1737    Acceptance, E,TB, VU by STEPHANIE at 7/28/2020 1904   Caregiver Acceptance, E,D, NR,NL by RAHEEL at 8/1/2020 1327    Acceptance, E,D, NR by RAHEEL at 7/30/2020 1737                   Point: Precautions (In Progress)     Description:   Instruct learner(s) on prescribed precautions during mobility and gait tasks              Learning Progress Summary           Patient Acceptance, E, VU,NR by GRACIE at 8/7/2020 1633    Comment:  Safety with mobility    Acceptance, E, NL by RB at 8/6/2020 1132    Acceptance, E, VU by RB at 8/6/2020 1056    Acceptance, E,TB, VU by AA at 8/5/2020 1507    Acceptance, E,TB, VU by AA at 8/4/2020 1621    Acceptance, E,D, NR,NL by PH at 8/3/2020 0956    Acceptance, E,D, NR,NL by RAHEEL at 8/1/2020 1327    Acceptance, E,D, NR by RAHEEL at 7/30/2020 1737    Acceptance, E,TB,D, VU,NR by  at 7/27/2020 1237   Family Acceptance, E,D, NR by RAHEEL at 7/30/2020 1737    Acceptance, E,TB, VU by STEPHANIE at 7/28/2020 1904   Caregiver Acceptance, E,D, NR,NL by RAHEEL at 8/1/2020 1327    Acceptance, E,D, NR by RAHEEL at 7/30/2020 1737                               User Key     Initials Effective Dates Name Provider Type Discipline     04/03/18 -  Cecily Camp, PT Physical Therapist PT    RAHEEL 03/07/18 -  Beverly Estrada PTA Physical Therapy Assistant PT    RB 01/18/17 -   Garett Williamson, RN Registered Nurse Nurse    PH 08/20/19 -  Kelsi Jiang PTA Physical Therapy Assistant PT     09/17/19 -  Enid Coley PT Physical Therapist PT    AA 05/21/20 -  Ary Michaud, RN Registered Nurse Nurse              PT Recommendation and Plan  Planned Therapy Interventions (PT Eval): balance training, bed mobility training, gait training, home exercise program, patient/family education, postural re-education, strengthening, transfer training  Outcome Summary/Treatment Plan (PT)  Anticipated Discharge Disposition (PT): (S) skilled nursing facility(dependent on progress)  Plan of Care Reviewed With: patient  Outcome Summary: Pt with intermittent confusion. She was c/o LBP and agreed to sit up at EOB. She required Mod A for bed mobility and CG/Min A for sitting balance at EOB. She was unable to transition to standing due to increased pain at R knee with attempt to stand. Pt asked for Tylenol, RN notified. Cont with skilled PT for strength and functional mobility. May benefit from SNF dependent on progress with PT.      Time Calculation:   PT Charges     Row Name 08/07/20 1635             Time Calculation    Start Time  1514  -MW      Stop Time  1600  -MW      Time Calculation (min)  46 min  -MW      PT Received On  08/07/20  -MW      PT - Next Appointment  08/08/20  -MW      PT Goal Re-Cert Due Date  08/14/20  -MW         Time Calculation- PT    Total Timed Code Minutes- PT  25 minute(s)  -MW        User Key  (r) = Recorded By, (t) = Taken By, (c) = Cosigned By    Initials Name Provider Type     Enid Coley PT Physical Therapist        Therapy Charges for Today     Code Description Service Date Service Provider Modifiers Qty    [id number]  PT THERAPEUTIC ACT EA 15 MIN 8/7/2020 Enid Coley, PT GP 2          PT G-Codes  Outcome Measure Options: AM-PAC 6 Clicks Basic Mobility (PT)  AM-PAC 6 Clicks Score (PT): 10    Enid Coley PT  8/7/2020

## 2020-08-07 NOTE — PROGRESS NOTES
"  Daily Progress Note.   [address]  8/7/2020    Patient:  Name:  Yeni Jimenez  MRN:  [id number]  11/6/1932  87 y.o.  female         CC: Short of breath    Interval History:  Follow up ae of copd, pna  Calmer today  Awakens asks where the lung doctor is when I explain I am th elung doctor but not screaming or trying to get out of bed.   COVID + in room with her now it seems as he has been discharged.    ROS: otherwise limited by altered mental status  PMFSSH: no change    Physical Exam:  /71 (BP Location: Right arm, Patient Position: Lying)   Pulse 78   Temp 97.8 °F (36.6 °C) (Oral)   Resp 16   Ht 154.9 cm (60.98\")   Wt 58 kg (127 lb 14.4 oz)   SpO2 91%   BMI 24.18 kg/m²   Body mass index is 24.18 kg/m².    Intake/Output Summary (Last 24 hours) at 8/7/2020 1451  Last data filed at 8/7/2020 1447  Gross per 24 hour   Intake 450 ml   Output 700 ml   Net -250 ml     General appearance: Confused but alert,  nto yelling today and more interactive even if confused  Eyes: she squeezes her eyes shut wont open them  HENT: Atraumatic; limited by facemask in global covid pandemic  Neck: Trachea midline; FROM, supple, no thyromegaly or lymphadenopathy  Lungs: no expiratory wheeze no rhonchi, bilateral air entry  CV: RRR, no rub  Abdomen: Soft, non-tender; no masses or HSM  Extremities: Trace bilateral peripheral edema or extremity lymphadenopathy  Skin: Normal temperature, turgor and texture; no rash, ulcers or subcutaneous nodules  Psych: Confused affect, not alert and oriented but not agitated    Data Review:  Notable Labs:  Results from last 7 days   Lab Units 08/07/20  0551 08/06/20  0622 08/05/20  0758 08/04/20  0815 08/02/20  0711 08/01/20  0358   WBC 10*3/mm3 14.20* 14.48* 12.31* 14.29* 10.43 13.93*   HEMOGLOBIN g/dL 11.5* 12.8 12.4 11.7* 10.4* 11.9*   PLATELETS 10*3/mm3 221 301 279 238 218 239     Results from last 7 days   Lab Units 08/05/20  0758 08/02/20  0711 08/01/20  0358  "   SODIUM mmol/L 139 143 141   POTASSIUM mmol/L 4.1 3.5 3.8   CHLORIDE mmol/L 102 105 102   CO2 mmol/L 27.1 30.3* 27.4   BUN mg/dL 25* 19 20   CREATININE mg/dL 0.70 0.70 0.69   GLUCOSE mg/dL 91 96 106*   CALCIUM mg/dL 9.3 9.5 10.2   Estimated Creatinine Clearance: 40.6 mL/min (by C-G formula based on SCr of 0.7 mg/dL).    Results from last 7 days   Lab Units 08/07/20  0551 08/06/20  0622 08/05/20  0758 08/04/20  0815   FERRITIN ng/mL  --   --   --  416.00*   CRP mg/dL  --   --   --  2.65*   PLATELETS 10*3/mm3 221 301 279 238             Imaging:  Reviewed chest images personally from past 3 days    Scheduled meds:      ALPRAZolam 1 mg Oral TID   aspirin 81 mg Oral Daily   budesonide-formoterol 2 puff Inhalation BID - RT   dilTIAZem  mg Oral Q24H   enoxaparin 40 mg Subcutaneous Q24H   furosemide 20 mg Oral Daily   levothyroxine 112 mcg Oral Daily   losartan 100 mg Oral Q24H   multivitamin with minerals 1 tablet Oral Daily   OLANZapine 5 mg Intramuscular Once   OLANZapine 10 mg Oral Daily Before Supper   OLANZapine 2.5 mg Oral Daily   pantoprazole 40 mg Oral QAM   polyethylene glycol 17 g Oral Daily   potassium chloride 20 mEq Oral Daily   predniSONE 20 mg Oral Daily With Breakfast   senna 2 tablet Oral BID   tiotropium bromide monohydrate 2 puff Inhalation Daily - RT   traZODone 100 mg Oral Nightly       ASSESSMENT  /  PLAN:  1. Acute respiratory failure, hypoxia  2. Bilateral pulmonary infiltrates, R>L  3. Dysphagia with pills only previously  4. Acute systolic and diastolic CHF  5. Acute lactic acidosis  6. Hx RA  7. Chronic prednisone 5 mg  8. Asthma exacerbation  9. Diuretic induced hypokalemia  10. Delirium vs steroid psychosis     Very concerned that her agitation may be worsened by her Decadron.  She has been stable on her oxygen requirements from prior to her COVID diagnosis to after.  I think at this point her risk of hurting herself with confusion and agitation outweigh the benefits of Decadron at high  "doses.  And her ox requirements have not gone up since her diagnosis.  I favor transitioning over to prednisone 20 mg daily see if this helps    Pt takes higher dose benzodiazepine at home, Dr Chowdary going to trial restarting at home dose, we will monitor for over sedation.    Completed 7 days of antibiotic    Oxygen requirements stable    Continue with a dysphagia diet    Patient seen and examined using gown, double gloves, appropriate mask and goggles as per current COVID PPE CDC guidelines.    Coordinated care with Dr. Chowdary today    Rodrigo Gonzalez MD  [facility]  08/07/20  528pm            Daily Progress Note.   [address]  8/7/2020    Patient:  Name:  Yeni Jimenez  MRN:  [id number]  11/6/1932  87 y.o.  female         CC: Short of breath    Interval History:  Follow up ae of copd, pna  Patient is severely agitated she is looking out the window screaming.  She does not interact with me.  An attempt to listen to her are drastically hindered by her screaming and yelling.  Cannot redirect her.  She appears very frightened.  ROS: otherwise limited by altered mental status  PMFSSH: no change    Physical Exam:  /71 (BP Location: Right arm, Patient Position: Lying)   Pulse 78   Temp 97.8 °F (36.6 °C) (Oral)   Resp 16   Ht 154.9 cm (60.98\")   Wt 58 kg (127 lb 14.4 oz)   SpO2 91%   BMI 24.18 kg/m²   Body mass index is 24.18 kg/m².    Intake/Output Summary (Last 24 hours) at 8/7/2020 1453  Last data filed at 8/7/2020 1447  Gross per 24 hour   Intake 450 ml   Output 700 ml   Net -250 ml     General appearance: Confused but alert, yelling at window wont interact  Eyes: anicteric sclerae, moist conjunctivae; no lid-lag; PERRLA  HENT: Atraumatic; oropharynx clear with moist mucous membranes and no mucosal ulcerations; normal hard and soft palate  Neck: Trachea midline; FROM, supple, no thyromegaly or lymphadenopathy  Lungs: no expiratory wheeze no rhonchi, however " patient yelling during exam  CV: RRR, no rub  Abdomen: Soft, non-tender; no masses or HSM  Extremities: Trace bilateral peripheral edema or extremity lymphadenopathy  Skin: Normal temperature, turgor and texture; no rash, ulcers or subcutaneous nodules  Psych: Confused affect, not alert and oriented agitated    Data Review:  Notable Labs:  Results from last 7 days   Lab Units 08/07/20  0551 08/06/20  0622 08/05/20  0758 08/04/20  0815 08/02/20  0711 08/01/20  0358   WBC 10*3/mm3 14.20* 14.48* 12.31* 14.29* 10.43 13.93*   HEMOGLOBIN g/dL 11.5* 12.8 12.4 11.7* 10.4* 11.9*   PLATELETS 10*3/mm3 221 301 279 238 218 239     Results from last 7 days   Lab Units 08/05/20  0758 08/02/20  0711 08/01/20  0358   SODIUM mmol/L 139 143 141   POTASSIUM mmol/L 4.1 3.5 3.8   CHLORIDE mmol/L 102 105 102   CO2 mmol/L 27.1 30.3* 27.4   BUN mg/dL 25* 19 20   CREATININE mg/dL 0.70 0.70 0.69   GLUCOSE mg/dL 91 96 106*   CALCIUM mg/dL 9.3 9.5 10.2   Estimated Creatinine Clearance: 40.6 mL/min (by C-G formula based on SCr of 0.7 mg/dL).    Results from last 7 days   Lab Units 08/07/20  0551 08/06/20  0622 08/05/20  0758 08/04/20  0815   FERRITIN ng/mL  --   --   --  416.00*   CRP mg/dL  --   --   --  2.65*   PLATELETS 10*3/mm3 221 301 279 238             Imaging:  Reviewed chest images personally from past 3 days    Scheduled meds:      ALPRAZolam 1 mg Oral TID   aspirin 81 mg Oral Daily   budesonide-formoterol 2 puff Inhalation BID - RT   dilTIAZem  mg Oral Q24H   enoxaparin 40 mg Subcutaneous Q24H   furosemide 20 mg Oral Daily   levothyroxine 112 mcg Oral Daily   losartan 100 mg Oral Q24H   multivitamin with minerals 1 tablet Oral Daily   OLANZapine 5 mg Intramuscular Once   OLANZapine 10 mg Oral Daily Before Supper   OLANZapine 2.5 mg Oral Daily   pantoprazole 40 mg Oral QAM   polyethylene glycol 17 g Oral Daily   potassium chloride 20 mEq Oral Daily   predniSONE 20 mg Oral Daily With Breakfast   senna 2 tablet Oral BID      tiotropium bromide monohydrate 2 puff Inhalation Daily - RT   traZODone 100 mg Oral Nightly       ASSESSMENT  /  PLAN:  11. Acute respiratory failure, hypoxia  12. Bilateral pulmonary infiltrates, R>L  13. Dysphagia with pills only previously  14. Acute systolic and diastolic CHF  15. Acute lactic acidosis  16. Hx RA  17. Chronic prednisone 5 mg  18. Asthma exacerbation  19. Diuretic induced hypokalemia  20. Delirium vs steroid psychosis     Cont prednisone 20 mg daily oxygen coming down and she is better less agitated  Pt takes higher dose benzodiazepine at home, Dr Chowdary going to trial restarting at home dose, we will monitor for over sedation but appears to be back to her baseline a few days ago    Completed 7 days of antibiotic    Oxygen requirements stable    Continue with a dysphagia diet    Patient seen and examined using gown, double gloves, appropriate mask and goggles as per current COVID PPE CDC guidelines.    Coordinated care with Dr. Chowdary today    Rodrigo Gonzalez MD  [facility]  08/07/20  253pm

## 2020-08-08 NOTE — PROGRESS NOTES
"[facility] PULMONARY CARE         Dr Caldera Sayied   LOS: 14 days   Patient Care Team:  Arian Moyer MD as PCP - General  Arian Moyer MD as PCP - Family Medicine    Chief Complaint: Acute hypoxemic respiratory failure bilateral pulmonary infiltrate with COVID-19 pneumonia    Interval History: Remains on isolation    REVIEW OF SYSTEMS:   Patient confused.     Ventilator/Non-Invasive Ventilation Settings (From admission, onward)   Currently on room air      Vital Signs  Temp:  [97.3 °F (36.3 °C)-98.9 °F (37.2 °C)] 98.9 °F (37.2 °C)  Heart Rate:  [] 72  Resp:  [16] 16  BP: (113-189)/() 130/92    Intake/Output Summary (Last 24 hours) at 8/8/2020 1247  Last data filed at 8/8/2020 1106  Gross per 24 hour   Intake 360 ml   Output 1500 ml   Net -1140 ml     Flowsheet Rows      First Filed Value   Admission Height  154.9 cm (61\") Documented at 07/25/2020 1033   Admission Weight  63.5 kg (140 lb) Documented at 07/25/2020 1033          Physical Exam: In order to preserve PPE and chart reviewed and noted stable clinical status.  Discussed with hospitalist.  Vital signs reviewed  Patient remains on room air    Results Review:        Results from last 7 days   Lab Units 08/08/20  0540 08/05/20  0758 08/02/20  0711   SODIUM mmol/L 142 139 143   POTASSIUM mmol/L 3.5 4.1 3.5   CHLORIDE mmol/L 103 102 105   CO2 mmol/L 26.4 27.1 30.3*   BUN mg/dL 20 25* 19   CREATININE mg/dL 0.63 0.70 0.70   GLUCOSE mg/dL 86 91 96   CALCIUM mg/dL 8.8 9.3 9.5         Results from last 7 days   Lab Units 08/08/20  0540 08/07/20  0551 08/06/20  0622   WBC 10*3/mm3 11.55* 14.20* 14.48*   HEMOGLOBIN g/dL 11.5* 11.5* 12.8   HEMATOCRIT % 34.4 35.5 37.7   PLATELETS 10*3/mm3 199 221 301                           I reviewed the patient's new clinical results.  I personally viewed and interpreted the patient's CXR        Medication Review:     ALPRAZolam 1 mg Oral TID   aspirin 81 mg Oral Daily   budesonide-formoterol 2 " puff Inhalation BID - RT   dilTIAZem  mg Oral Q24H   enoxaparin 40 mg Subcutaneous Q24H   furosemide 20 mg Oral Daily   levothyroxine 112 mcg Oral Daily   losartan 100 mg Oral Q24H   multivitamin with minerals 1 tablet Oral Daily   OLANZapine 5 mg Intramuscular Once   OLANZapine 10 mg Oral Daily Before Supper   OLANZapine 2.5 mg Oral Daily   pantoprazole 40 mg Oral QAM   polyethylene glycol 17 g Oral Daily   potassium chloride 20 mEq Oral Daily   predniSONE 20 mg Oral Daily With Breakfast   senna 2 tablet Oral BID   tiotropium bromide monohydrate 2 puff Inhalation Daily - RT   traZODone 100 mg Oral Nightly            ASSESSMENT:   1. Acute respiratory failure, hypoxia  2. COVID-19 pneumonia  3. Bilateral pulmonary infiltrates, R>L  4. Dysphagia with pills only previously  5. Acute systolic and diastolic CHF  6. Acute lactic acidosis  7. Hx RA  8. Chronic prednisone 5 mg  9. Asthma exacerbation  10. Diuretic induced hypokalemia  11. Delirium vs steroid psychosis      PLAN:  Improving oxygen requirement although she is still desaturates in her sleep.  Discussed with Dr. Chowdary.  She has completed 7 days of antibiotics.  Continue weaning down steroids as tolerated.  ID has already signed off  Her confusion seems to be improving per Dr. Chowdary.  This seems to be her biggest issue.  Limit sedation medications as much as possible.  Continue diet  Continue current supportive care.  Monitor clinical status closely      Werner Barker MD  08/08/20  12:47

## 2020-08-08 NOTE — PLAN OF CARE
Problem: Patient Care Overview  Goal: Plan of Care Review  Outcome: Ongoing (interventions implemented as appropriate)  Flowsheets (Taken 8/8/2020 1805)  Progress: no change  Plan of Care Reviewed With: spouse; patient  Outcome Summary: Patient alert to self only. Confused. Follows commands. Patient able to move arms and  hands. Vital signs are stable. Patient on room air to 1 liter of oxygen. Posey restraint in place. Will continue to monitor.  Goal: Individualization and Mutuality  Outcome: Ongoing (interventions implemented as appropriate)  Goal: Discharge Needs Assessment  Outcome: Ongoing (interventions implemented as appropriate)  Goal: Interprofessional Rounds/Family Conf  Outcome: Ongoing (interventions implemented as appropriate)     Problem: Fall Risk (Adult)  Goal: Absence of Fall  Outcome: Ongoing (interventions implemented as appropriate)  Flowsheets (Taken 8/4/2020 0349 by Juan Manuel Pete, RN)  Absence of Fall: making progress toward outcome     Problem: Skin Injury Risk (Adult)  Goal: Skin Health and Integrity  Outcome: Ongoing (interventions implemented as appropriate)  Flowsheets (Taken 8/8/2020 1805)  Skin Health and Integrity: making progress toward outcome     Problem: Pain, Chronic (Adult)  Goal: Acceptable Pain/Comfort Level and Functional Ability  Outcome: Ongoing (interventions implemented as appropriate)  Flowsheets (Taken 8/8/2020 1805)  Acceptable Pain/Comfort Level and Functional Ability: making progress toward outcome     Problem: Pneumonia (Adult)  Goal: Signs and Symptoms of Listed Potential Problems Will be Absent, Minimized or Managed (Pneumonia)  Outcome: Ongoing (interventions implemented as appropriate)  Flowsheets  Taken 8/3/2020 0635 by Tata Mcgarry, RN  Problems Assessed (Pneumonia): all  Taken 8/8/2020 1805 by Frances Estrada, RN  Problems Present (Pneumonia): respiratory compromise     Problem: Restraint, Nonbehavioral (Nonviolent)  Goal: Rationale and  Justification  Outcome: Ongoing (interventions implemented as appropriate)  Flowsheets (Taken 8/8/2020 7120)  Rationale and Justification: failure of less restrictive safety measures  Goal: Nonbehavioral (Nonviolent) Restraint: Absence of Injury/Harm  Outcome: Ongoing (interventions implemented as appropriate)  Goal: Nonbehavioral (Nonviolent) Restraint: Achievement of Discontinuation Criteria  Outcome: Ongoing (interventions implemented as appropriate)  Goal: Nonbehavioral (Nonviolent) Restraint: Preservation of Dignity and Wellbeing  Outcome: Ongoing (interventions implemented as appropriate)

## 2020-08-08 NOTE — PROGRESS NOTES
Name: Yeni Jimenez ADMIT: 2020   : 1932  PCP: Arian Moyer MD    MRN: [id number] LOS: 14 days   AGE/SEX: 87 y.o. female  ROOM: [facility]   Subjective   Chief Complaint   Patient presents with   • Altered Mental Status   • Shortness of Breath     Still with confusion  Is on zyprexa from Psychiatry  On steroids now and chronically  On xanax as outpatient - 1mg QID PRN    More awake today but still some confusion at times  Off/on oxygen    ROS  Unreliable due to mental status    Objective   Vital Signs  Temp:  [97.3 °F (36.3 °C)-98.9 °F (37.2 °C)] 97.8 °F (36.6 °C)  Heart Rate:  [] 85  Resp:  [16] 16  BP: (130-189)/() 130/90  SpO2:  [89 %-95 %] 92 %  on  Flow (L/min):  [0.5] 0.5;   Device (Oxygen Therapy): room air  Body mass index is 24.8 kg/m².    Elderly  Chronically ill  More awake today  Mouth is dry- difficulty to say if slurred speech or not with her dry mouth  perphaps very slight facial asymetry  4/5 gross strength in bilateral upper and lower extremities  Physical Exam   HENT:   Head: Normocephalic and atraumatic.   Eyes: Conjunctivae are normal. No scleral icterus.   Neck: Neck supple. No tracheal deviation present.   Cardiovascular: Normal rate and regular rhythm.   Pulmonary/Chest: Effort normal and breath sounds normal.   Decreased bs at bases   Abdominal: Soft. There is no tenderness. There is no guarding.   Neurological: She exhibits normal muscle tone.   Skin: Skin is warm and dry.       Results Review:       I reviewed the patient's new clinical results.  Results from last 7 days   Lab Units 20  0540 20  0551 20  0622 20  0758   WBC 10*3/mm3 11.55* 14.20* 14.48* 12.31*   HEMOGLOBIN g/dL 11.5* 11.5* 12.8 12.4   PLATELETS 10*3/mm3 199 221 301 279     Results from last 7 days   Lab Units 20  0540 20  0758 20  0711   SODIUM mmol/L 142 139 143   POTASSIUM mmol/L 3.5 4.1 3.5   CHLORIDE mmol/L 103 102 105   CO2 mmol/L 26.4 27.1  30.3*   BUN mg/dL 20 25* 19   CREATININE mg/dL 0.63 0.70 0.70   GLUCOSE mg/dL 86 91 96   Estimated Creatinine Clearance: 41.1 mL/min (by C-G formula based on SCr of 0.63 mg/dL).      Results from last 7 days   Lab Units 08/08/20  0540 08/05/20  0758 08/02/20  0711   CALCIUM mg/dL 8.8 9.3 9.5         Coag     HbA1C No results found for: HGBA1C  Infection     Radiology(recent) No radiology results for the last day  No results found for: TROPONINT, TROPONINI, BNP  No components found for: TSH;2      ALPRAZolam 1 mg Oral TID   aspirin 81 mg Oral Daily   budesonide-formoterol 2 puff Inhalation BID - RT   dilTIAZem  mg Oral Q24H   enoxaparin 40 mg Subcutaneous Q24H   furosemide 20 mg Oral Daily   levothyroxine 112 mcg Oral Daily   losartan 100 mg Oral Q24H   multivitamin with minerals 1 tablet Oral Daily   OLANZapine 5 mg Intramuscular Once   OLANZapine 7.5 mg Oral Daily Before Supper   pantoprazole 40 mg Oral QAM   polyethylene glycol 17 g Oral Daily   potassium chloride 20 mEq Oral Daily   predniSONE 20 mg Oral Daily With Breakfast   senna 2 tablet Oral BID   tiotropium bromide monohydrate 2 puff Inhalation Daily - RT   traZODone 100 mg Oral Nightly      Diet Dysphagia; IV - Mechanical Soft No Mixed Consistencies; Nectar / Syrup Thick      Assessment/Plan      Active Hospital Problems    Diagnosis  POA   • **Acute respiratory failure with hypoxia (CMS/[facility]) [J96.01]  Yes   • Pneumonia due to COVID-19 virus [U07.1, J12.89]  No   • Aspiration pneumonia of right lung due to vomit (CMS/[facility]) [J69.0]  Yes   • Acute on chronic combined systolic and diastolic CHF (congestive heart failure) (CMS/[facility]) [I50.43]  Yes   • Asthma exacerbation [J45.901]  Yes   • Rheumatoid arthritis involving multiple sites (CMS/[facility]) [M06.9]  Yes   • Polypharmacy [Z79.899]  Not Applicable   • Elevated lactic acid level [R79.89]  Yes   • 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       Aspiration Pneumonia  - has bilateral infiltrates, worse on the right  - completed 7d course of augmentin with improvement in respiratory status  - pulmonary toilet     Asthma Exacerbation  - continue on steroids (prednisone changed to decadron with COVID-19 diagnosis) and nebulized bronchodilators  - on 10 mg prednisone daily at home  - appreciate pulmonology recs     COVID-19 PNA  - admission test was negative, diagnosed after her , who has been with her consistently, was diagnosed  - continue on steroids which have been adjusted by Pulmonary  - Sometimes on RA, sometimes on oxygen  - appreciate ID recs, they have signed off     Hypothyroidism  - continue on levothyroxine     Multifocal Atrial Tachycardia  - now resolved  - on diltiazem  - appreciate cardiology recs     Acute on Chronic Systolic CHF  - volume status is improved after IV lasix  - on her home dose of lasix 20mg daily     Metabolic Encephalopathy/Delrium  -Placed on Zyprexa per Psychiatry  -Will decrease that dose a little now that she is perhaps less confused- to try to avoid sedation    -Reviewed her Toño- She is prescribed xanax 1mg 120pills/month. She is getting less than that here- seems to be 0.5mg 1-2 x/day prn. While I would not recommend that dosing for a patient her age there could be an element of w/d going on so I scheduled 1mg TID and see if that improves symptoms.  -possible slurred speech today but seemed more issue of dry mouth. DW Stroke Neurology and she would not be a TPA candidate. She is already on asa. Will hold off additional testing with her COVID status and the fact it would not  and also may worsenen her confusion to get sedation and that is her  biggest issue right now    Dysphagia  On modified diet      SCDs for DVT prophylaxis.  Discussed with patient, nursing staff, consulting provider and care team on multidisciplinary rounds.  Anticipate discharge TBD.  in 2-3 days. Likely home with HH when she is more stable- I think he confusion would be better at home than the hospital.       DW RN  Greater than 37 minutes spent with greater than 50% counseling and coordinating care      Cameron Chowdary MD  [facility]  08/08/20  4:15 PM

## 2020-08-08 NOTE — NURSING NOTE
Patient found on floor at 1555. Team placed patient back in bed. Assessment and vitals done. No signs of injury. Notified home manager and charge nurse.     1614 Spoke with Dr. Chowdary. New orders received.     1618 Spoke with family.

## 2020-08-08 NOTE — PLAN OF CARE
Patient alert follows commands confusion at baseline. Plans for discharge home with family and caretakers, possibly over this weekend or Monday. Imani changed this shift pericare provided. Prn anxiety meds given. No c/o pain or nausea noted. Overnight oximetry on room air, did not have to apply oxygen at any time this shift, so far. No acute distress noted. Q2h turn. Skin care provided. Will continue to monitor

## 2020-08-09 NOTE — PLAN OF CARE
Oriented to self only. Talks illogical with occasional yelling out. Becomes anxious, Xanax given as ordered. Posey vest on.  Total care. Appetite poor. Meds crushed in applesauce. C/o occ. neck pain. Telemetry SB/SR w/ BBB.  Problem: Patient Care Overview  Goal: Plan of Care Review  Outcome: Ongoing (interventions implemented as appropriate)  Goal: Individualization and Mutuality  Outcome: Ongoing (interventions implemented as appropriate)  Goal: Discharge Needs Assessment  Outcome: Ongoing (interventions implemented as appropriate)  Goal: Interprofessional Rounds/Family Conf  Outcome: Ongoing (interventions implemented as appropriate)     Problem: Patient Care Overview  Goal: Plan of Care Review  Outcome: Ongoing (interventions implemented as appropriate)  Goal: Individualization and Mutuality  Outcome: Ongoing (interventions implemented as appropriate)  Goal: Discharge Needs Assessment  Outcome: Ongoing (interventions implemented as appropriate)  Goal: Interprofessional Rounds/Family Conf  Outcome: Ongoing (interventions implemented as appropriate)     Problem: Fall Risk (Adult)  Goal: Absence of Fall  Outcome: Ongoing (interventions implemented as appropriate)     Problem: Skin Injury Risk (Adult)  Goal: Skin Health and Integrity  Outcome: Ongoing (interventions implemented as appropriate)     Problem: Pain, Chronic (Adult)  Goal: Acceptable Pain/Comfort Level and Functional Ability  Outcome: Ongoing (interventions implemented as appropriate)     Problem: Pneumonia (Adult)  Goal: Signs and Symptoms of Listed Potential Problems Will be Absent, Minimized or Managed (Pneumonia)  Outcome: Ongoing (interventions implemented as appropriate)     Problem: Restraint, Nonbehavioral (Nonviolent)  Goal: Rationale and Justification  Outcome: Ongoing (interventions implemented as appropriate)  Goal: Nonbehavioral (Nonviolent) Restraint: Absence of Injury/Harm  Outcome: Ongoing (interventions implemented as appropriate)  Goal:  Nonbehavioral (Nonviolent) Restraint: Achievement of Discontinuation Criteria  Outcome: Ongoing (interventions implemented as appropriate)  Goal: Nonbehavioral (Nonviolent) Restraint: Preservation of Dignity and Wellbeing  Outcome: Ongoing (interventions implemented as appropriate)

## 2020-08-09 NOTE — PLAN OF CARE
Patient alert confused follows commands, posey restraint, monitoring and charting q2h at least. Prn pain and anxiety meds given. Q2h turned, incont care provided. Pericare barrier cream applied. No nausea noted no acute distress noted. Meds crushed and given with applesauce. Patient is on room air. Will continue to monitor

## 2020-08-09 NOTE — PROGRESS NOTES
Name: Yeni Jimenez ADMIT: 2020   : 1932  PCP: Arian Moyer MD    MRN: [id number] LOS: 15 days   AGE/SEX: 87 y.o. female  ROOM: [facility]   Subjective   Chief Complaint   Patient presents with   • Altered Mental Status   • Shortness of Breath     Still with confusion  Is on zyprexa from Psychiatry  On steroids now and chronically  On xanax as outpatient - 1mg QID PRN    More awake today  Off/on oxygen  Had fall yesterday  Denies any specific complaints or injury from it    ROS  Unreliable due to mental status    Objective   Vital Signs  Temp:  [94.7 °F (34.8 °C)-97.8 °F (36.6 °C)] 97.3 °F (36.3 °C)  Heart Rate:  [61-97] 61  Resp:  [16-18] 16  BP: (117-157)/(64-90) 157/90  SpO2:  [88 %-92 %] 88 %  on  Flow (L/min):  [1] 1;   Device (Oxygen Therapy): room air  Body mass index is 25.13 kg/m².    Elderly  Chronically ill  More awake today  No slurred speech  4/5 gross strength in bilateral upper extremities  Physical Exam   HENT:   Head: Normocephalic and atraumatic.   Eyes: Conjunctivae are normal. No scleral icterus.   Neck: Neck supple. No tracheal deviation present.   Cardiovascular: Normal rate and regular rhythm.   Pulmonary/Chest: Effort normal and breath sounds normal.   Decreased bs at bases   Abdominal: Soft. There is no tenderness. There is no guarding.   Neurological: She exhibits normal muscle tone.   Skin: Skin is warm and dry.       Results Review:       I reviewed the patient's new clinical results.  Results from last 7 days   Lab Units 20  0540 20  0551 20  0622 20  0758   WBC 10*3/mm3 11.55* 14.20* 14.48* 12.31*   HEMOGLOBIN g/dL 11.5* 11.5* 12.8 12.4   PLATELETS 10*3/mm3 199 221 301 279     Results from last 7 days   Lab Units 20  0540 20  0758   SODIUM mmol/L 142 139   POTASSIUM mmol/L 3.5 4.1   CHLORIDE mmol/L 103 102   CO2 mmol/L 26.4 27.1   BUN mg/dL 20 25*   CREATININE mg/dL 0.63 0.70   GLUCOSE mg/dL 86 91   Estimated Creatinine  Clearance: 41.3 mL/min (by C-G formula based on SCr of 0.63 mg/dL).      Results from last 7 days   Lab Units 08/08/20  0540 08/05/20  0758   CALCIUM mg/dL 8.8 9.3         Coag     HbA1C No results found for: HGBA1C  Infection     Radiology(recent) No radiology results for the last day  No results found for: TROPONINT, TROPONINI, BNP  No components found for: TSH;2      ALPRAZolam 1 mg Oral TID   aspirin 81 mg Oral Daily   budesonide-formoterol 2 puff Inhalation BID - RT   dilTIAZem  mg Oral Q24H   enoxaparin 40 mg Subcutaneous Q24H   furosemide 20 mg Oral Daily   levothyroxine 112 mcg Oral Daily   losartan 100 mg Oral Q24H   multivitamin with minerals 1 tablet Oral Daily   OLANZapine 5 mg Intramuscular Once   OLANZapine 7.5 mg Oral Daily Before Supper   pantoprazole 40 mg Oral QAM   polyethylene glycol 17 g Oral Daily   potassium chloride 20 mEq Oral Daily   predniSONE 20 mg Oral Daily With Breakfast   senna 2 tablet Oral BID   tiotropium bromide monohydrate 2 puff Inhalation Daily - RT   traZODone 100 mg Oral Nightly      Diet Dysphagia; IV - Mechanical Soft No Mixed Consistencies; Nectar / Syrup Thick      Assessment/Plan      Active Hospital Problems    Diagnosis  POA   • **Acute respiratory failure with hypoxia (CMS/[facility]) [J96.01]  Yes   • Pneumonia due to COVID-19 virus [U07.1, J12.89]  No   • Aspiration pneumonia of right lung due to vomit (CMS/[facility]) [J69.0]  Yes   • Acute on chronic combined systolic and diastolic CHF (congestive heart failure) (CMS/[facility]) [I50.43]  Yes   • Asthma exacerbation [J45.901]  Yes   • Rheumatoid arthritis involving multiple sites (CMS/[facility]) [M06.9]  Yes   • Polypharmacy [Z79.899]  Not Applicable   • Elevated lactic acid level [R79.89]  Yes   • 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       Aspiration Pneumonia  - has bilateral infiltrates, worse on the right  - completed 7d course of augmentin with improvement in respiratory status  - pulmonary toilet     Asthma Exacerbation  - continue on steroids (prednisone changed to decadron with COVID-19 diagnosis) and nebulized bronchodilators  - on 10 mg prednisone daily at home  - appreciate pulmonology recs     COVID-19 PNA  - admission test was negative, diagnosed after her , who has been with her consistently, was diagnosed  - continue on steroids which have been adjusted by Pulmonary  - Sometimes on RA, sometimes on oxygen  - appreciate ID recs, they have signed off     Hypothyroidism  - continue on levothyroxine     Multifocal Atrial Tachycardia  - now resolved  - on diltiazem  - appreciate cardiology recs     Acute on Chronic Systolic CHF  - volume status is improved after IV lasix  - on her home dose of lasix 20mg daily     Metabolic Encephalopathy/Delrium  -Placed on Zyprexa per Psychiatry  -decreased that dose a little now that she is perhaps less confused- to try to avoid sedation    -Reviewed her Toño- She is prescribed xanax 1mg 120pills/month. She is getting less than that here- seems to be 0.5mg 1-2 x/day prn. While I would not recommend that dosing for a patient her age there could be an element of w/d going on so I scheduled 1mg TID and see if that improves symptoms.  -possible slurred speech today but seemed more issue of dry mouth. DW Stroke Neurology and she would not be a TPA candidate. She is already on asa. Will hold off additional testing with her COVID status and the fact it would not  and also may worsenen her confusion to get sedation and that is her biggest issue right now. The symptoms seem to be resolved today. Had tried to order CT scan yesterday but unable to get done apparently-  will cancel now with symptoms resolved.     Dysphagia  On modified diet      SCDs for DVT prophylaxis.  Discussed with patient, nursing staff, consulting provider and care team on multidisciplinary rounds.  Anticipate discharge TBD. . Likely home with HH when she is more stable- I think he confusion would be better at home than the hospital.       DW RN  Greater than 38 minutes spent with greater than 50% counseling and coordinating care      Cameron Chowdary MD  [facility]  08/09/20  4:15 PM

## 2020-08-09 NOTE — PROGRESS NOTES
"[facility]         Dr Caldera Sayied   LOS: 15 days   Patient Care Team:  Arian Moyer MD as PCP - General  Arian Moyer MD as PCP - Family Medicine    Chief Complaint: Acute hypoxemic respiratory failure bilateral pulmonary infiltrate with COVID-19 pneumonia    Interval History: Remains on isolation.  Discussed with hospitalist Dr. Chowdary and nursing staff.  Still ongoing confusion and remains on room air.    REVIEW OF SYSTEMS:   Patient confused.     Ventilator/Non-Invasive Ventilation Settings (From admission, onward)   Currently on room air      Vital Signs  Temp:  [94.7 °F (34.8 °C)-97.7 °F (36.5 °C)] 97.3 °F (36.3 °C)  Heart Rate:  [61-97] 61  Resp:  [16-18] 16  BP: (117-157)/(64-90) 157/90  No intake or output data in the 24 hours ending 08/09/20 1430  Flowsheet Rows      First Filed Value   Admission Height  154.9 cm (61\") Documented at 07/25/2020 1033   Admission Weight  63.5 kg (140 lb) Documented at 07/25/2020 1033          Physical Exam: In order to preserve PPE and chart reviewed and noted stable clinical status.  Discussed with hospitalist.  Vital signs reviewed  Patient remains on room air    Results Review:        Results from last 7 days   Lab Units 08/08/20  0540 08/05/20  0758   SODIUM mmol/L 142 139   POTASSIUM mmol/L 3.5 4.1   CHLORIDE mmol/L 103 102   CO2 mmol/L 26.4 27.1   BUN mg/dL 20 25*   CREATININE mg/dL 0.63 0.70   GLUCOSE mg/dL 86 91   CALCIUM mg/dL 8.8 9.3         Results from last 7 days   Lab Units 08/08/20  0540 08/07/20  0551 08/06/20  0622   WBC 10*3/mm3 11.55* 14.20* 14.48*   HEMOGLOBIN g/dL 11.5* 11.5* 12.8   HEMATOCRIT % 34.4 35.5 37.7   PLATELETS 10*3/mm3 199 221 301                           I reviewed the patient's new clinical results.  I personally viewed and interpreted the patient's CXR        Medication Review:     ALPRAZolam 1 mg Oral TID   aspirin 81 mg Oral Daily   budesonide-formoterol 2 puff Inhalation BID - RT   dilTIAZem "  mg Oral Q24H   enoxaparin 40 mg Subcutaneous Q24H   furosemide 20 mg Oral Daily   levothyroxine 112 mcg Oral Daily   losartan 100 mg Oral Q24H   multivitamin with minerals 1 tablet Oral Daily   OLANZapine 5 mg Intramuscular Once   OLANZapine 7.5 mg Oral Daily Before Supper   pantoprazole 40 mg Oral QAM   polyethylene glycol 17 g Oral Daily   potassium chloride 20 mEq Oral Daily   [START ON 8/10/2020] predniSONE 10 mg Oral Daily With Breakfast   senna 2 tablet Oral BID   tiotropium bromide monohydrate 2 puff Inhalation Daily - RT   traZODone 100 mg Oral Nightly            ASSESSMENT:   1. Acute respiratory failure, hypoxia  2. COVID-19 pneumonia  3. Bilateral pulmonary infiltrates, R>L  4. Dysphagia with pills only previously  5. Acute systolic and diastolic CHF  6. Acute lactic acidosis  7. Hx RA  8. Chronic prednisone 5 mg  9. Asthma exacerbation  10. Diuretic induced hypokalemia  11. Delirium vs steroid psychosis      PLAN:  Improving oxygen requirement although she is still desaturates in her sleep and minimal exertion.  Discussed with Dr. Chowdary.  She has completed 7 days of antibiotics.  Continue weaning down steroids as tolerated.  ID has already signed off  Her confusion seems to be improving per Dr. Chowdary.  This seems to be her biggest issue.  Limit sedation medications as much as possible.  I am weaning down steroids this may help to with her confusion.  Tolerating p.o. diet  Continue current supportive care.  Monitor clinical status closely      Werner Barker MD  08/09/20  14:30

## 2020-08-10 NOTE — PROGRESS NOTES
Continued Stay Note  [facility]     Patient Name: Yeni Jimenez  MRN: [id number]  Today's Date: 8/10/2020    Admit Date: 7/25/2020    Discharge Plan     Row Name 08/10/20 1207       Plan    Plan  Plan home with spouse, private caregivers, and [facility].  CARLITSO Robbins RN    Patient/Family in Agreement with Plan  yes    Plan Comments  PT IS IN COVID ISOLATION.  Called and spoke to pt's spouse ( Dr. Jimenez [phone]).  Called and spoke with pt's daughter  (Carolyn Hopkins [phone]) .   Pt to be discharged in am.  [facility] called and states they can transport on 8/11 but it will be 2100.   [facility] booked for 8/11 @ 2100.   Angelica  ( [phone]) called to deliver hospital bed and follow for O2 needs.  Home [facility] ( [phone]) called and notified of pt's anticipated discharge on 8/11.  Olivia  ( [phone]) called  with [facility].  Plan home with spouse, private caregivers, and [facility].  CARLITOS Robbins RN        Discharge Codes    No documentation.             Lynda Robbins, RN

## 2020-08-10 NOTE — PLAN OF CARE
Problem: Patient Care Overview  Goal: Plan of Care Review  Outcome: Ongoing (interventions implemented as appropriate)  Flowsheets (Taken 8/10/2020 8097)  Outcome Summary: Alert, but disoriented.  Still in posey vest but will attempt d/c of restraint after dinner.  Planned discharge tomorrow at 2100.  Vitals stable.  Remains in COVID isolation.  Will continue to monitor.  Goal: Individualization and Mutuality  Outcome: Ongoing (interventions implemented as appropriate)  Goal: Discharge Needs Assessment  Outcome: Ongoing (interventions implemented as appropriate)  Goal: Interprofessional Rounds/Family Conf  Outcome: Ongoing (interventions implemented as appropriate)     Problem: Fall Risk (Adult)  Goal: Absence of Fall  Outcome: Ongoing (interventions implemented as appropriate)     Problem: Skin Injury Risk (Adult)  Goal: Skin Health and Integrity  Outcome: Ongoing (interventions implemented as appropriate)     Problem: Pain, Chronic (Adult)  Goal: Acceptable Pain/Comfort Level and Functional Ability  Outcome: Ongoing (interventions implemented as appropriate)     Problem: Pneumonia (Adult)  Goal: Signs and Symptoms of Listed Potential Problems Will be Absent, Minimized or Managed (Pneumonia)  Outcome: Ongoing (interventions implemented as appropriate)     Problem: Restraint, Nonbehavioral (Nonviolent)  Goal: Rationale and Justification  Outcome: Ongoing (interventions implemented as appropriate)  Goal: Nonbehavioral (Nonviolent) Restraint: Absence of Injury/Harm  Outcome: Ongoing (interventions implemented as appropriate)  Goal: Nonbehavioral (Nonviolent) Restraint: Achievement of Discontinuation Criteria  Outcome: Ongoing (interventions implemented as appropriate)  Goal: Nonbehavioral (Nonviolent) Restraint: Preservation of Dignity and Wellbeing  Outcome: Ongoing (interventions implemented as appropriate)

## 2020-08-10 NOTE — PROGRESS NOTES
Name: Yeni Jimenez ADMIT: 2020   : 1932  PCP: Arian Moyer MD    MRN: [id number] LOS: 16 days   AGE/SEX: 87 y.o. female  ROOM: [facility]   Subjective   Chief Complaint   Patient presents with   • Altered Mental Status   • Shortness of Breath     Still with confusion  Is on zyprexa from Psychiatry  On steroids now and chronically  On xanax as outpatient - 1mg QID PRN    More awake past few days but still confused  Off/on oxygen      ROS  Unreliable due to mental status    Objective   Vital Signs  Temp:  [97.1 °F (36.2 °C)-98 °F (36.7 °C)] 97.2 °F (36.2 °C)  Heart Rate:  [70-85] 80  Resp:  [16-20] 16  BP: (128-157)/(72-89) 131/77  SpO2:  [91 %-95 %] 94 %  on   ;   Device (Oxygen Therapy): room air  Body mass index is 24.73 kg/m².    Elderly  Chronically ill  Awake  Confused, poor memory  No slurred speech  Physical Exam   HENT:   Head: Normocephalic and atraumatic.   Eyes: Conjunctivae are normal. No scleral icterus.   Neck: Neck supple. No tracheal deviation present.   Cardiovascular: Normal rate and regular rhythm.   Pulmonary/Chest: Effort normal and breath sounds normal.   Decreased bs at bases   Abdominal: Soft. There is no tenderness. There is no guarding.   Neurological: She exhibits normal muscle tone.   Skin: Skin is warm and dry.       Results Review:       I reviewed the patient's new clinical results.  Results from last 7 days   Lab Units 20  0540 20  0551 20  0622 20  0758   WBC 10*3/mm3 11.55* 14.20* 14.48* 12.31*   HEMOGLOBIN g/dL 11.5* 11.5* 12.8 12.4   PLATELETS 10*3/mm3 199 221 301 279     Results from last 7 days   Lab Units 20  0540 20  0758   SODIUM mmol/L 142 139   POTASSIUM mmol/L 3.5 4.1   CHLORIDE mmol/L 103 102   CO2 mmol/L 26.4 27.1   BUN mg/dL 20 25*   CREATININE mg/dL 0.63 0.70   GLUCOSE mg/dL 86 91   Estimated Creatinine Clearance: 41 mL/min (by C-G formula based on SCr of 0.63 mg/dL).      Results from last 7 days   Lab Units  08/08/20  0540 08/05/20  0758   CALCIUM mg/dL 8.8 9.3         Coag     HbA1C No results found for: HGBA1C  Infection     Radiology(recent) No radiology results for the last day  No results found for: TROPONINT, TROPONINI, BNP  No components found for: TSH;2      ALPRAZolam 1 mg Oral TID   aspirin 81 mg Oral Daily   budesonide-formoterol 2 puff Inhalation BID - RT   dilTIAZem  mg Oral Q24H   enoxaparin 40 mg Subcutaneous Q24H   furosemide 20 mg Oral Daily   levothyroxine 112 mcg Oral Daily   losartan 100 mg Oral Q24H   multivitamin with minerals 1 tablet Oral Daily   OLANZapine 5 mg Intramuscular Once   OLANZapine 7.5 mg Oral Daily Before Supper   pantoprazole 40 mg Oral QAM   polyethylene glycol 17 g Oral Daily   potassium chloride 20 mEq Oral Daily   predniSONE 10 mg Oral Daily With Breakfast   senna 2 tablet Oral BID   tiotropium bromide monohydrate 2 puff Inhalation Daily - RT   traZODone 100 mg Oral Nightly      Diet Dysphagia; IV - Mechanical Soft No Mixed Consistencies; Nectar / Syrup Thick      Assessment/Plan      Active Hospital Problems    Diagnosis  POA   • **Acute respiratory failure with hypoxia (CMS/[facility]) [J96.01]  Yes   • Pneumonia due to COVID-19 virus [U07.1, J12.89]  No   • Aspiration pneumonia of right lung due to vomit (CMS/[facility]) [J69.0]  Yes   • Acute on chronic combined systolic and diastolic CHF (congestive heart failure) (CMS/[facility]) [I50.43]  Yes   • Asthma exacerbation [J45.901]  Yes   • Rheumatoid arthritis involving multiple sites (CMS/[facility]) [M06.9]  Yes   • Polypharmacy [Z79.899]  Not Applicable   • Elevated lactic acid level [R79.89]  Yes   • 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       Aspiration Pneumonia  - has bilateral infiltrates, worse on the right  - completed 7d course of augmentin with improvement in respiratory status  - pulmonary toilet     Asthma Exacerbation  - continue on steroids (prednisone changed to decadron with COVID-19 diagnosis) and nebulized bronchodilators  - on 10 mg prednisone daily at home  - appreciate pulmonology recs     COVID-19 PNA  - admission test was negative, diagnosed after her , who has been with her consistently, was diagnosed  - continue on steroids which have been adjusted by Pulmonary  - Sometimes on RA, sometimes on oxygen  - appreciate ID recs, they have signed off     Hypothyroidism  - continue on levothyroxine     Multifocal Atrial Tachycardia  - now resolved  - on diltiazem  - appreciate cardiology recs     Acute on Chronic Systolic CHF  - volume status is improved after IV lasix  - on her home dose of lasix 20mg daily     Metabolic Encephalopathy/Delrium  -Placed on Zyprexa per Psychiatry  -decreased that dose a little now that she is perhaps less confused- to try to avoid sedation    -Reviewed her Toño- She is prescribed xanax 1mg 120pills/month. She is getting less than that here- seems to be 0.5mg 1-2 x/day prn. While I would not recommend that dosing for a patient her age there could be an element of w/d going on so I scheduled 1mg TID and see if that improves symptoms.  -possible slurred speech today but seemed more issue of dry mouth. DW Stroke Neurology and she would not be a TPA candidate. She is already on asa. Will hold off additional testing with her COVID status and the fact it would not  and also may worsenen her confusion to get sedation and that is her biggest issue right now. The symptoms seem to be resolved 8/9 and 8/10. Had tried to order CT scan yesterday but unable to get done apparently- will cancel now with symptoms resolved.     Dysphagia  On modified diet      SCDs for DVT  prophylaxis.  Discussed with patient, nursing staff, consulting provider and care team on multidisciplinary rounds.  Anticipate discharge TBD. . Likely home with HH when she is more stable- I think he confusion would be better at home than the hospital.       DW RN  Greater than 36 minutes spent with greater than 50% counseling and coordinating care. Lots of effort in coordinating care as lots of needs for her at discharge.       Cameron Chowdary MD  [facility]  08/10/20  4:15 PM

## 2020-08-10 NOTE — NURSING NOTE
Day shift RN stated that Dr. Chowdary wants pt's sats at least 88% or higher, without O2. MD does not want to send pt home on oxygen. Pt's O2 was sustaining between 85-86%. Called [facility], spoke with FABIANO Hunt, who stated to give pt's PRN inhaler to see if sats increased. If sats did not increase then she stated to put pt on O2. RN called RT to get PRN treatment and he stated that it would not help with SOA or low sats. RN stated not to worry about the treatment since it will not help. Pt's sats now at 90%. Will continue to monitor.

## 2020-08-10 NOTE — PLAN OF CARE
No c/o pain or SOA at this time. A&O to self only. Medications administered crushed in apple sauce. Q2 turn. [place] vest in place for pt's safety and to limit pt's mobility, no signs of injury present. Incontinence care complete throughout shift. Pt is on RA, sating 88% or greater. VSS. No s/s of distress at this time. Will continue to monitor.       Problem: Patient Care Overview  Goal: Plan of Care Review  Outcome: Ongoing (interventions implemented as appropriate)  Flowsheets (Taken 8/10/2020 3883)  Progress: no change  Plan of Care Reviewed With: patient  Goal: Individualization and Mutuality  Outcome: Ongoing (interventions implemented as appropriate)  Goal: Discharge Needs Assessment  Outcome: Ongoing (interventions implemented as appropriate)  Goal: Interprofessional Rounds/Family Conf  Outcome: Ongoing (interventions implemented as appropriate)     Problem: Fall Risk (Adult)  Goal: Absence of Fall  Outcome: Ongoing (interventions implemented as appropriate)     Problem: Skin Injury Risk (Adult)  Goal: Skin Health and Integrity  Outcome: Ongoing (interventions implemented as appropriate)     Problem: Pain, Chronic (Adult)  Goal: Acceptable Pain/Comfort Level and Functional Ability  Outcome: Ongoing (interventions implemented as appropriate)     Problem: Pneumonia (Adult)  Goal: Signs and Symptoms of Listed Potential Problems Will be Absent, Minimized or Managed (Pneumonia)  Outcome: Ongoing (interventions implemented as appropriate)     Problem: Restraint, Nonbehavioral (Nonviolent)  Goal: Rationale and Justification  Outcome: Ongoing (interventions implemented as appropriate)  Goal: Nonbehavioral (Nonviolent) Restraint: Absence of Injury/Harm  Outcome: Ongoing (interventions implemented as appropriate)  Goal: Nonbehavioral (Nonviolent) Restraint: Achievement of Discontinuation Criteria  Outcome: Ongoing (interventions implemented as appropriate)  Goal: Nonbehavioral (Nonviolent) Restraint: Preservation of  Dignity and Wellbeing  Outcome: Ongoing (interventions implemented as appropriate)

## 2020-08-11 NOTE — PROGRESS NOTES
"Physicians Statement of Medical Necessity for  Ambulance Transportation    GENERAL INFORMATION     Name: Yeni Jimenez  YOB: 1932  Medicare #: [id number]  Transport Date: 8/11/2020  (Valid for round trips this date, or for scheduled repetitive trips for 60 days from the date signed below.)  Origin: [facility]  Destination: [address] [place] [address] PL[place]  Is the Patient's stay covered under Medicare Part A (PPS/DRG?)Yes   Closest appropriate facility? Yes  If this a hosp-hosp transfer? No  Is this a hospice patient? No    MEDICAL NECESSITY QUESTIONAIRE    Ambulance Transportation is medically necessary only if other means of transportation are contraindicated or would be potentially harmful to the patient.  To meet this requirement, the patient must be either \"bed confined\" or suffer from a condition such that transport by means other than an ambulance is contraindicated by the patient's condition.  The following questions must be answered by the healthcare professional signing below for this form to be valid:     1) Describe the MEDICAL CONDITION (physical and/or mental) of this patient AT THE TIME OF AMBULANCE TRANSPORT that requires the patient to be transported in an ambulance, and why transport by other means is contraindicated by the patient's condition: PT WITH DEMENTIA  Past Medical History:   Diagnosis Date   • Anxiety    • Arthritis    • Chronic back pain    • Disease of thyroid gland    • Hypertension    • Hypothyroidism    • Left bundle branch block    • Osteoarthritis    • Osteoporosis    • Palpitation    • Rheumatoid arthritis (CMS/HCC)    • 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HCC)       Past Surgical History:   Procedure Laterality Date   • COLON RESECTION WITH COLOSTOMY     • COLONOSCOPY     • COLOSTOMY CLOSURE     • SIGMOIDOSCOPY     • TONSILLECTOMY        2) Is this patient \"bed confined\" as defined below?No    To be \"bed confined\" the patient must satisfy all three of the following " criteria:  (1) unable to get up from bed without assistance; AND (2) unable to ambulate;  AND (3) unable to sit in a chair or wheelchair.  3) Can this patient safely be transported by car or wheelchair van (I.e., may safely sit during transport, without an attendant or monitoring?)No   4. In addition to completing questions 1-3 above, please check any of the following conditions that apply*:          *Note: supporting documentation for any boxes checked must be maintained in the patient's medical records Patient is confused      SIGNATURE OF PHYSICIAN OR OTHER AUTHORIZED HEALTHCARE PROFESSIONAL    I certify that the above information is true and correct based on my evaluation of this patient, and represent that the patient requires transport by ambulance and that other forms of transport are contraindicated.  I understand that this information will be used by the Centers for Medicare and Medicaid Services (CMS) to support the determiniation of medical necessity for ambulance services, and I represent that I have personal knowledge of the patient's condition at the time of transport.    xx   If this box is checked, I also certify that the patient is physically or mentally incapable of signing the ambulance service's claim form and that the institution with which I am affiliated has furnished care, services or assistance to the patient.  My signature below is made on behalf of the patient pursuant to 42 .36(b)(4). In accordance with 42 .37, the specific reason(s) that the patient is physically or mentally incapable of signing the claim for is as follows: PT WITH CONFUSION    Signature of Physician or Healthcare Professional  Date/Time:   8/11/2020     (For Scheduled repetitive transport, this form is not valid for transports performed more than 60 days after this date).        SRINIVAS KEITA RN                                                                                                                                     --------------------------------------------------------------------------------------------  Printed Name and Credentials of Physician or Authorized Healthcare Professional     *Form must be signed by patient's attending physician for scheduled, repetitive transports,.  For non-repetitive ambulance transports, if unable to obtain the signature of the attending physician, any of the following may sign (please select below):     Physician  Clinical Nurse Specialist  Registered Nurse XX    Physician Assistant  Discharge Planner XX Licensed Practical Nurse     Nurse Practitioner   XX

## 2020-08-11 NOTE — DISCHARGE SUMMARY
NAME: Yeni Jimenez ADMIT: 2020   : 1932  PCP: Arian Moyer MD    MRN: [id number] LOS: 17 days   AGE/SEX: 87 y.o. female  ROOM: 49/     Date of Admission:  2020  Date of Discharge:  2020    PCP: Arian Moyer MD    CHIEF COMPLAINT  Altered Mental Status and Shortness of Breath      DISCHARGE DIAGNOSIS  Active Hospital Problems    Diagnosis  POA   • **Acute respiratory failure with hypoxia (CMS/[facility]) [J96.01]  Yes   • Pneumonia due to COVID-19 virus [U07.1, J12.89]  No   • Aspiration pneumonia of right lung due to vomit (CMS/[facility]) [J69.0]  Yes   • Acute on chronic combined systolic and diastolic CHF (congestive heart failure) (CMS/[facility]) [I50.43]  Yes   • Asthma exacerbation [J45.901]  Yes   • Rheumatoid arthritis involving multiple sites (CMS/[facility]) [M06.9]  Yes   • Polypharmacy [Z79.899]  Not Applicable   • Elevated lactic acid level [R79.89]  Yes   • 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       SECONDARY DIAGNOSES  Past Medical History:   Diagnosis Date   • Anxiety    • Arthritis    • Chronic back pain    • Disease of thyroid gland    • Hypertension    • Hypothyroidism    • Left bundle branch block    • Osteoarthritis    • Osteoporosis    • Palpitation    • Rheumatoid arthritis (CMS/HCC)    • 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HCC)        CONSULTS   Pulmonary  ID  Cardiology  Psychaitry    HOSPITAL COURSE  Patient is a 87 y.o. female presented to [facility] complaining of shortness of breath, increased confusion.  Please see the admitting history and physical for further details.  She was admitted for community acquired pna rt lung and acute  hypoxic resp failure. She was started on antibiotics. Pulmonology was consulted. She was initially placed on nonrebreather then required bipap due to decline in respiratory status. CXR showed interstitial edema. She was started on IV diuresis and Cardiology followed. Code status remained full code. There was concern for aspiration. SLP evaluated and she was placed on modified diet once deemed safe for po intake. She completed 7 day course of antibiotics for pna and had improvement in resp status. On admission, Covid screen was negative. Repeat testing was positive. She was treated with dexamethasone and ID followed. Psychiatry evaluated delirium and started zyprexa which has been decreased to avoid sedation. Cognition continues to wax and wane, decision has been made to discharge pt home as cognition will likely improve out of the hospital.         DIAGNOSTICS  Glucose 86 mg/dL   BUN 20 mg/dL   Creatinine 0.63 mg/dL   Sodium 142 mmol/L   Potassium 3.5 mmol/L   Chloride 103 mmol/L   CO2 26.4 mmol/L   Calcium 8.8 mg/dL   eGFR Non African Am 89 mL/min/1.73   BUN/Creatinine Ratio 31.7High     Anion Gap 12.6 mmol/L     WBC 11.55High  10*3/mm3   RBC 3.59Low  10*6/mm3   Hemoglobin 11.5Low  g/dL   Hematocrit 34.4 %   MCV 95.8 fL   MCH 32.0 pg   MCHC 33.4 g/dL   RDW 13.2 %   RDW-SD 46.9 fl   MPV 11.0 fL   Platelets 199 10*3/mm3   Neutrophil Rel % 78.7High  %   Lymphocyte Rel % 9.4Low  %   Monocyte Rel % 7.0 %   Eosinophil Rel % 0.4 %   Basophil Rel % 0.3 %   Immature Granulocyte Rel % 4.2High  %   Neutrophils Absolute 9.10High  10*3/mm3   Lymphocytes Absolute 1.08 10*3/mm3   Monocytes Absolute 0.81 10*3/mm3   Eosinophils Absolute 0.05 10*3/mm3   Basophils Absolute 0.03 10*3/mm3   Immature Grans, Absolute 0.48High  10*3/mm3   nRBC 0.0 /100 WBC           PHYSICAL EXAM  Objective    Alert  nad  No resp distress    CONDITION ON DISCHARGE  Stable.      DISCHARGE DISPOSITION   Home or Self Care      DISCHARGE MEDICATIONS          Your medication list      START taking these medications      Instructions Last Dose Given Next Dose Due   albuterol sulfate  (90 Base) MCG/ACT inhaler  Commonly known as:  PROVENTIL HFA;VENTOLIN HFA;PROAIR HFA      Inhale 2 puffs Every 4 (Four) Hours As Needed for Wheezing.       aspirin 81 MG chewable tablet  Start taking on:  August 12, 2020      Chew 1 tablet Daily.       dilTIAZem  MG 24 hr capsule  Commonly known as:  CARDIZEM CD  Start taking on:  August 12, 2020      Take 1 capsule by mouth Daily.       mometasone-formoterol 200-5 MCG/ACT inhaler  Commonly known as:  Dulera      Inhale 2 puffs 2 (Two) Times a Day.       potassium chloride 10 MEQ CR capsule  Commonly known as:  MICRO-K  Start taking on:  August 12, 2020      Take 2 capsules by mouth Daily.       tiotropium bromide monohydrate 2.5 MCG/ACT aerosol solution inhaler  Commonly known as:  SPIRIVA RESPIMAT  Start taking on:  August 12, 2020      Inhale 2 puffs Daily.          CHANGE how you take these medications      Instructions Last Dose Given Next Dose Due   predniSONE 10 MG tablet  Commonly known as:  DELTASONE  What changed:    · medication strength  · how much to take  · additional instructions      Take 1 tablet by mouth Daily. Continue 10mg daily until instructed to taper down by PCP          CONTINUE taking these medications      Instructions Last Dose Given Next Dose Due   acetaminophen-codeine 300-30 MG per tablet  Commonly known as:  TYLENOL #3      Take 2 tablets by mouth Every 4 (Four) Hours As Needed for Moderate Pain .       ALPRAZolam 1 MG tablet  Commonly known as:  XANAX      Take 1 mg by mouth Every 6 (Six) Hours As Needed for Anxiety.       ascorbic acid 1000 MG tablet  Commonly known as:  VITAMIN C      Take 1,000 mg by mouth Daily.       diclofenac 1 % gel gel  Commonly known as:  VOLTAREN      See Admin Instructions.       diphenhydrAMINE 25 mg capsule  Commonly known as:  BENADRYL      Take 25 mg by mouth  Every 6 (Six) Hours As Needed (Difficulty swallowing).       Donnatal 16.2 MG per tablet  Generic drug:  PB-Hyoscy-Atropine-Scopolamine      Donnatal 16.2 mg-0.1037 mg-0.0194 mg tablet   Take as needed for irritable bowel.       furosemide 20 MG tablet  Commonly known as:  LASIX      Take 20 mg by mouth Daily As Needed (for ankle edema).       guaiFENesin 100 MG/5ML syrup  Commonly known as:  ROBITUSSIN      Take 200 mg by mouth Every 6 (Six) Hours As Needed for Cough.       lansoprazole 30 MG capsule  Commonly known as:  PREVACID      Take 30 mg by mouth 2 (Two) Times a Day.       levothyroxine 112 MCG tablet  Commonly known as:  SYNTHROID, LEVOTHROID      Take 112 mcg by mouth Daily.       lidocaine 5 %  Commonly known as:  LIDODERM      Place 1 patch on the skin as directed by provider Daily As Needed for Mild Pain  (either knee). Remove & Discard patch within 12 hours or as directed by MD       losartan 50 MG tablet  Commonly known as:  COZAAR      Take 50 mg by mouth 2 (Two) Times a Day.       Movantik 25 MG tablet  Generic drug:  Naloxegol Oxalate      TAKE 1 TABLET BY MOUTH DAILY       OLANZapine 10 MG tablet  Commonly known as:  zyPREXA      Take 10 mg by mouth Daily Before Supper.       polyethylene glycol 17 g packet  Commonly known as:  MIRALAX      Take 17 g by mouth Daily.       PreserVision/Lutein capsule      Take 1 capsule by mouth 2 (two) times a day.       multivitamin with minerals tablet tablet      Take 1 tablet by mouth Daily.       salsalate 500 MG tablet  Commonly known as:  DISALCID      Take 1,000 mg by mouth Every 6 (Six) Hours As Needed.       senna 8.6 MG tablet  Commonly known as:  SENOKOT      Take 2 tablets by mouth 2 (Two) Times a Day.       traZODone 100 MG tablet  Commonly known as:  DESYREL      Take 100 mg by mouth Every Night.       Vitamin D-3 25 MCG (1000 UT) capsule      Take 1,000 Units by mouth Daily.          STOP taking these medications    amLODIPine 5 MG  tablet  Commonly known as:  NORVASC        leflunomide 20 MG tablet  Commonly known as:  ARAVA        metoprolol succinate XL 25 MG 24 hr tablet  Commonly known as:  TOPROL-XL              Where to Get Your Medications      These medications were sent to [id number] - [place], [place] - [address] AT [facility] - [phone]  - [phone] FX  [address], [facility] [address]    Phone:  [phone]   · albuterol sulfate  (90 Base) MCG/ACT inhaler  · dilTIAZem  MG 24 hr capsule  · mometasone-formoterol 200-5 MCG/ACT inhaler  · potassium chloride 10 MEQ CR capsule  · predniSONE 10 MG tablet  · tiotropium bromide monohydrate 2.5 MCG/ACT aerosol solution inhaler     Information about where to get these medications is not yet available    Ask your nurse or doctor about these medications  · aspirin 81 MG chewable tablet        No future appointments.  Additional Instructions for the Follow-ups that You Need to Schedule     Ambulatory Referral to Home Health   As directed      Face to Face Visit Date:  8/11/2020    Follow-up provider for Plan of Care?:  I treated the patient in an acute care facility and will not continue treatment after discharge.    Follow-up provider:  VIPUL HERNANDEZ [[id number]]    Reason/Clinical Findings:  hypoxia, dementia, COVID-19    Describe mobility limitations that make leaving home difficult:  hypoxia, dementia, COVID-19    Nursing/Therapeutic Services Requested:  Skilled Nursing Physical Therapy Speech Therapy    Skilled nursing orders:  Medication education COPD management O2 instruction    PT orders:  Therapeutic exercise Strengthening    SLP orders:  Dysphagia therapy           Follow-up Information     Sheldon Pereyra MD Follow up in 2 week(s).    Specialty:  Cardiology  Why:  We will call with appointment  Contact information:  [address] [address] [phone]             Comfort  Arian SIGALA MD .    Specialty:  Internal Medicine  Contact information:  [address] [id number]  [phone]                   TEST  RESULTS PENDING AT DISCHARGE         Cameron Chowdary MD  [facility]  08/11/20  15:50      Time: greater than 32 minutes on discharge  It was a pleasure taking care of this patient while in the hospital.

## 2020-08-11 NOTE — PLAN OF CARE
Pt denies pain and SOA  throughout shift. Medications administered crushed in apple sauce, tolerated well. Q2 turn. A&O to self only. Restraint order renewed d/t pt yelling, stating she's getting up, and not following commands from RN to stay in bed. RN felt that pt's safety was priority d/t recent fall, weakness, and confusion. VSS. No s/s of distress at this time. Will continue to monitor.       Problem: Patient Care Overview  Goal: Plan of Care Review  Outcome: Ongoing (interventions implemented as appropriate)  Flowsheets (Taken 8/11/2020 6684)  Progress: no change  Plan of Care Reviewed With: patient  Goal: Individualization and Mutuality  Outcome: Ongoing (interventions implemented as appropriate)  Goal: Discharge Needs Assessment  Outcome: Ongoing (interventions implemented as appropriate)  Goal: Interprofessional Rounds/Family Conf  Outcome: Ongoing (interventions implemented as appropriate)     Problem: Fall Risk (Adult)  Goal: Absence of Fall  Outcome: Ongoing (interventions implemented as appropriate)     Problem: Skin Injury Risk (Adult)  Goal: Skin Health and Integrity  Outcome: Ongoing (interventions implemented as appropriate)     Problem: Pain, Chronic (Adult)  Goal: Acceptable Pain/Comfort Level and Functional Ability  Outcome: Ongoing (interventions implemented as appropriate)     Problem: Pneumonia (Adult)  Goal: Signs and Symptoms of Listed Potential Problems Will be Absent, Minimized or Managed (Pneumonia)  Outcome: Ongoing (interventions implemented as appropriate)     Problem: Restraint, Nonbehavioral (Nonviolent)  Goal: Rationale and Justification  Outcome: Ongoing (interventions implemented as appropriate)  Goal: Nonbehavioral (Nonviolent) Restraint: Absence of Injury/Harm  Outcome: Ongoing (interventions implemented as appropriate)  Goal: Nonbehavioral (Nonviolent) Restraint: Achievement of Discontinuation Criteria  Outcome: Ongoing (interventions implemented as appropriate)  Goal:  Nonbehavioral (Nonviolent) Restraint: Preservation of Dignity and Wellbeing  Outcome: Ongoing (interventions implemented as appropriate)

## 2020-08-11 NOTE — PROGRESS NOTES
"Adult Nutrition  Assessment/PES    Patient Name:  Yeni Jimenez  YOB: 1932  MRN: [id number]  Admit Date:  7/25/2020    Assessment Date:  8/11/2020    Comments:  Nutrition follow up.  Alert to self only.  [place] vest.  Likely home with HH when more stable.  Eating 25-75% at meals, PO intake improving.    RD to continue to follow.    Reason for Assessment     Row Name 08/11/20 1406          Reason for Assessment    Reason For Assessment  follow-up protocol         Anthropometrics     Row Name 08/11/20 1406          Anthropometrics    Height  154.9 cm (60.98\")        Admit Weight    Admit Weight  -- 131# 8/11        Ideal Body Weight (IBW)    Ideal Body Weight (IBW) (kg)  48.11        Body Mass Index (BMI)    BMI Assessment  BMI 18.5-24.9: normal 24.69         Labs/Tests/Procedures/Meds     Row Name 08/11/20 1407          Labs/Procedures/Meds    Lab Results Reviewed  reviewed, pertinent     Lab Results Comments  WBC, Hgb, Hct        Diagnostic Tests/Procedures    Diagnostic Test/Procedure Reviewed  reviewed, pertinent        Medications    Pertinent Medications Reviewed  reviewed, pertinent     Pertinent Medications Comments  lasix, synthroid, MVI, protonix, miralax, KCl, prednisone, senokot         Physical Findings     Row Name 08/11/20 1407          Physical Findings    Skin  edema;other (see comments) B=14, bruised, excoriation         Estimated/Assessed Needs     Row Name 08/11/20 1406          Calculation Measurements    Height  154.9 cm (60.98\")         Nutrition Prescription Ordered     Row Name 08/11/20 1408          Nutrition Prescription PO    Current PO Diet  Dysphagia     Dysphagia Level  4  Mechanical soft no mixed consistencies     Fluid Consistency  Nectar/syrup thick     Supplement  Mighty Shake;Magic Cup     Supplement Frequency  2 times a day;Daily         Evaluation of Received Nutrient/Fluid Intake     Row Name 08/11/20 1408          PO Evaluation    Number of Meals  4     % PO " Intake  50 25-75%         Problem/Interventions:    Intervention Goal     Row Name 08/11/20 1409          Intervention Goal    General  Maintain nutrition;Reduce/improve symptoms;Disease management/therapy     PO  Increase intake;PO intake (%)     PO Intake %  75 %     Weight  Maintain weight         Nutrition Intervention     Row Name 08/11/20 1410          Nutrition Intervention    RD/Tech Action  Follow Tx progress;Care plan reviewd     Recommended/Ordered  Supplement         Education/Evaluation     Row Name 08/11/20 1411          Education    Education  Will Instruct as appropriate        Monitor/Evaluation    Monitor  Per protocol;PO intake;Supplement intake;Pertinent labs;Weight;Skin status;Symptoms           Electronically signed by:  Missy Butler RD  08/11/20 14:12

## 2020-08-11 NOTE — PLAN OF CARE
Confused but cooperative. Room air with sats above 89%. Rare cough. Had several incontinent BM's. [facility] has arranged home equipment. Ready for discharge.  Problem: Patient Care Overview  Goal: Plan of Care Review  Outcome: Ongoing (interventions implemented as appropriate)  Goal: Individualization and Mutuality  Outcome: Ongoing (interventions implemented as appropriate)  Goal: Discharge Needs Assessment  Outcome: Ongoing (interventions implemented as appropriate)  Goal: Interprofessional Rounds/Family Conf  Outcome: Ongoing (interventions implemented as appropriate)     Problem: Patient Care Overview  Goal: Plan of Care Review  Outcome: Ongoing (interventions implemented as appropriate)  Goal: Individualization and Mutuality  Outcome: Ongoing (interventions implemented as appropriate)  Goal: Discharge Needs Assessment  Outcome: Ongoing (interventions implemented as appropriate)  Goal: Interprofessional Rounds/Family Conf  Outcome: Ongoing (interventions implemented as appropriate)     Problem: Fall Risk (Adult)  Goal: Absence of Fall  Outcome: Ongoing (interventions implemented as appropriate)     Problem: Skin Injury Risk (Adult)  Goal: Skin Health and Integrity  Outcome: Ongoing (interventions implemented as appropriate)     Problem: Pain, Chronic (Adult)  Goal: Acceptable Pain/Comfort Level and Functional Ability  Outcome: Ongoing (interventions implemented as appropriate)     Problem: Pneumonia (Adult)  Goal: Signs and Symptoms of Listed Potential Problems Will be Absent, Minimized or Managed (Pneumonia)  Outcome: Ongoing (interventions implemented as appropriate)     Problem: Restraint, Nonbehavioral (Nonviolent)  Goal: Rationale and Justification  Outcome: Ongoing (interventions implemented as appropriate)  Goal: Nonbehavioral (Nonviolent) Restraint: Absence of Injury/Harm  Outcome: Ongoing (interventions implemented as appropriate)  Goal: Nonbehavioral (Nonviolent) Restraint: Achievement of Discontinuation  Criteria  Outcome: Ongoing (interventions implemented as appropriate)  Goal: Nonbehavioral (Nonviolent) Restraint: Preservation of Dignity and Wellbeing  Outcome: Ongoing (interventions implemented as appropriate)

## 2020-08-11 NOTE — PROGRESS NOTES
Continued Stay Note  [facility]     Patient Name: Yeni Jimenez  MRN: [id number]  Today's Date: 8/11/2020    Admit Date: 7/25/2020    Discharge Plan     Row Name 08/11/20 1552       Plan    Plan  Plan home with spouse, private caregivers and [facility].  CARLITOS Robbins RN    Patient/Family in Agreement with Plan  yes    Plan Comments  Pt to be discharged today.   Ambulance arranged to transport pt home at 9 pm.  Lateness of discharge time due to pt being COVID +.  Pt's daughter  (Carolyn Hopkins [phone]) called and was informed of plan to discharge pt.  [employer] to deliver hospital bed and nocturnal O2.  Olivia  ( [phone]) with [facility] called and informed of pt's discharge today.  Plan home with spouse, private caregivers, and [facility]. CARLITOS Robbins RN         Discharge Codes    No documentation.       Expected Discharge Date and Time     Expected Discharge Date Expected Discharge Time    Aug 11, 2020             Lynda Robbins, RN

## 2020-08-12 NOTE — OUTREACH NOTE
COPD/PN Week 1 Survey      Responses   [facility] patient discharged from?  [place]   COVID-19 Test Status  Confirmed   Does the patient have one of the following disease processes/diagnoses(primary or secondary)?  COPD/Pneumonia   Is there a successful TCM telephone encounter documented?  No   Was the primary reason for admission:  Pneumonia   Week 1 attempt successful?  No   Unsuccessful attempts  Attempt 1          Evelin Marinelli LPN

## 2020-08-12 NOTE — PROGRESS NOTES
Case Management Discharge Note      Final Note: Pt discharged home with [facility] and private caregivers on 8/11.   CARLITOS Robbins RN    Provided Post Acute Provider List?: Refused  Provided Post Acute Provider Quality & Resource List?: Refused    Destination      No service has been selected for the patient.      Durable Medical Equipment - Selection Complete      Service Provider Request Status Selected Services Address Phone Number Fax Number    [facility] Selected Durable Medical Equipment [address], [facility] [address] [phone]      Dialysis/Infusion      No service has been selected for the patient.      Home Medical Care - Selection Complete      Service Provider Request Status Selected Services Address Phone Number Fax Number    [facility] [place] Selected Home Health Services [address] [place] [address] [address] [phone]      Therapy      No service has been selected for the patient.      Community Resources      No service has been selected for the patient.        Transportation Services  Ambulance: [facility]    Final Discharge Disposition Code: 06 - home with home health care

## 2020-08-12 NOTE — OUTREACH NOTE
Prep Survey      Responses   [facility] from?  [place]   Is LACE score < 7 ?  No   Eligibility  Readm Mgmt   Discharge diagnosis  pneumonia, Covid 19   COVID-19 Test Status  Confirmed   Does the patient have one of the following disease processes/diagnoses(primary or secondary)?  COPD/Pneumonia   Does the patient have Home health ordered?  Yes   What is the Home health agency?    [facility]   Is there a DME ordered?  Yes   What DME was ordered?  [facility] bed and O2   Prep survey completed?  Yes          Lore Camarillo RN

## 2020-08-12 NOTE — PROGRESS NOTES
Continued Stay Note  [facility]     Patient Name: Yeni Jimenez  MRN: [id number]  Today's Date: 8/12/2020    Admit Date: 7/25/2020    Discharge Plan     Row Name 08/12/20 1349       Plan    Plan  Plan home with spouse, private caregivers and [facility].  CARLITOS Robbins RN    Patient/Family in Agreement with Plan  yes    Plan Comments  Received phone from pt's daughter  (Carolyn Hopkins [phone]) requesting gel foam mattress.   Angelica  ( [phone]) called and states pt qualifies.  Caro to assist Angelica in obtaining an order from MD.  Plan home with spouse , private caregivers, and [facility] ARLET Robbins RN        Discharge Codes    No documentation.       Expected Discharge Date and Time     Expected Discharge Date Expected Discharge Time    Aug 11, 2020             Lynda Robbins RN

## 2020-08-13 NOTE — OUTREACH NOTE
COPD/PN Week 1 Survey      Responses   [facility]   COVID-19 Test Status  Confirmed   Does the patient have one of the following disease processes/diagnoses(primary or secondary)?  COPD/Pneumonia   Is there a successful TCM telephone encounter documented?  No   Was the primary reason for admission:  Pneumonia   Week 1 attempt successful?  Yes   Call start time  1212   Call end time  1214   General alerts for this patient   is a MD    Discharge diagnosis  pneumonia, Covid 19   Is patient permission given to speak with other caregiver?  Yes   List who call center can speak with  [place]-    Person spoke with today (if not patient) and relationship  [facility]    Meds reviewed with patient/caregiver?  Yes   Is the patient having any side effects they believe may be caused by any medication additions or changes?  No   Does the patient have all medications ordered at discharge?  Yes   Is the patient taking all medications as directed (includes completed medication regime)?  Yes   Does the patient have a primary care provider?   Yes   Does the patient have an appointment with their PCP or pulmonologist within 7 days of discharge?  Yes   Has the patient kept scheduled appointments due by today?  N/A   Has home health visited the patient within 72 hours of discharge?  Yes   What DME was ordered?  [facility] bed and O2   Has all DME been delivered?  Yes   Pulse Ox monitoring  Intermittent   Pulse Ox device source  Patient   O2 Sat: education provided  Sat levels   Psychosocial issues?  No   Did the patient receive a copy of their discharge instructions?  Yes   Nursing interventions  Reviewed instructions with patient   What is the patient's perception of their health status since discharge?  Improving   Nursing Interventions  Nurse provided patient education   Is the patient/caregiver able to teach back the hierarchy of who to call/visit for  symptoms/problems? PCP, Specialist, Home health nurse, Urgent Care, ED, 911  Yes   Is the patient/caregiver able to teach back signs and symptoms of worsening condition:  Fever/chills, Shortness of breath, Chest pain   Is the patient/caregiver able to teach back importance of completing antibiotic course of treatment?  Yes   Week 1 call completed?  Yes          Mariel Albrecht RN

## 2020-08-14 NOTE — OUTREACH NOTE
COPD/PN Week 1 Survey      Responses   [facility]   COVID-19 Test Status  Confirmed   Does the patient have one of the following disease processes/diagnoses(primary or secondary)?  COPD/Pneumonia   Is there a successful TCM telephone encounter documented?  No   Was the primary reason for admission:  Pneumonia   Week 1 attempt successful?  Yes   Call start time  1102   Call end time  1109   Is patient permission given to speak with other caregiver?  Yes   Person spoke with today (if not patient) and relationship  [facility] reviewed with patient/caregiver?  Yes   Is the patient having any side effects they believe may be caused by any medication additions or changes?  No   Does the patient have all medications ordered at discharge?  Yes   Is the patient taking all medications as directed (includes completed medication regime)?  Yes   Does the patient have a primary care provider?   Yes   Has the patient kept scheduled appointments due by today?  N/A   Has home health visited the patient within 72 hours of discharge?  Yes   Has all DME been delivered?  Yes   Pulse Ox monitoring  Intermittent   Pulse Ox device source  Patient   O2 Sat comments  O2 sat is at 97% today. Also has current health.   O2 Sat: education provided  Sat levels, Monitoring frequency, When to seek care   O2 Sat education comments  States knows to call if stays below 92%.   Psychosocial issues?  No   Psychosocial comments   states home health has been helping them. Daughter is available to help from a distance due to her health problems.   Did the patient receive a copy of their discharge instructions?  Yes   Nursing interventions  Reviewed instructions with patient   What is the patient's perception of their health status since discharge?  Same   Nursing Interventions  Nurse provided patient education   Is the patient/caregiver able to teach back the hierarchy of who to call/visit for  symptoms/problems? PCP, Specialist, Home health nurse, Urgent Care, ED, 911  Yes   Is the patient/caregiver able to teach back signs and symptoms of worsening condition:  Fever/chills, Shortness of breath, Chest pain   Is the patient/caregiver able to teach back importance of completing antibiotic course of treatment?  Yes   Week 1 call completed?  Yes   Wrap up additional comments   states is about the same-[facility] home health has visited this morning. Denies any needs at this time.          Fany Lopez RN

## 2020-08-17 NOTE — OUTREACH NOTE
COPD/PN Week 1 Survey      Responses   [facility]   COVID-19 Test Status  Confirmed   Does the patient have one of the following disease processes/diagnoses(primary or secondary)?  COPD/Pneumonia   Is there a successful TCM telephone encounter documented?  No   Was the primary reason for admission:  Pneumonia   Week 1 attempt successful?  Yes   Call start time  1008   Call end time  1040   Is patient permission given to speak with other caregiver?  Yes   List who call center can speak with   or any caregiver   Person spoke with today (if not patient) and relationship  Yu-caregiver   Meds reviewed with patient/caregiver?  Yes   Is the patient having any side effects they believe may be caused by any medication additions or changes?  No   Does the patient have all medications ordered at discharge?  Yes   Is the patient taking all medications as directed (includes completed medication regime)?  Yes   Does the patient have a primary care provider?   Yes   Does the patient have an appointment with their PCP or pulmonologist within 7 days of discharge?  Yes   Has the patient kept scheduled appointments due by today?  N/A   Has home health visited the patient within 72 hours of discharge?  Yes   Home health comments   nurse will visit today.   Has all DME been delivered?  Yes   Pulse Ox monitoring  Intermittent   Pulse Ox device source  Patient   O2 Sat comments  caregiver had not checked O2 sats today yet due to patient resting   Psychosocial issues?  No   Psychosocial comments  Now has 2 caregivers who stay with them around the clock.   What is the patient's perception of their health status since discharge?  Same   Nursing Interventions  Nurse provided patient education   Is the patient/caregiver able to teach back signs and symptoms of worsening condition:  Fever/chills, Shortness of breath, Chest pain   Week 1 call completed?  Yes   Wrap up additional comments   Caregiver states is about the same. STates now has decubitus forming on sacral area-states will discuss with home health nurse today at visit. Denies any needs today.          Fany Lopez RN

## 2020-08-20 NOTE — OUTREACH NOTE
COPD/PN Week 2 Survey      Responses   [facility]   COVID-19 Test Status  Confirmed   Does the patient have one of the following disease processes/diagnoses(primary or secondary)?  COPD/Pneumonia   Was the primary reason for admission:  Pneumonia   Week 2 attempt successful?  Yes   Call start time  1151   Call end time  1152   Discharge diagnosis  pneumonia, Covid 19   Is patient permission given to speak with other caregiver?  Yes   List who call center can speak with   or any caregiver   Person spoke with today (if not patient) and relationship  [facility]-     Meds reviewed with patient/caregiver?  Yes   Is the patient having any side effects they believe may be caused by any medication additions or changes?  No   Does the patient have all medications ordered at discharge?  Yes   Is the patient taking all medications as directed (includes completed medication regime)?  Yes   Does the patient have a primary care provider?   Yes   Does the patient have an appointment with their PCP or pulmonologist within 7 days of discharge?  Yes   Has the patient kept scheduled appointments due by today?  Yes   Pulse Ox monitoring  Intermittent   Pulse Ox device source  Patient   Psychosocial issues?  No   Did the patient receive a copy of their discharge instructions?  Yes   Nursing interventions  Reviewed instructions with patient, Educated on MyChart   What is the patient's perception of their health status since discharge?  Improving   Nursing Interventions  Nurse provided patient education   Is the patient/caregiver able to teach back the hierarchy of who to call/visit for symptoms/problems? PCP, Specialist, Home health nurse, Urgent Care, ED, 911  Yes   Is the patient/caregiver able to teach back signs and symptoms of worsening condition:  Fever/chills, Shortness of breath, Chest pain   Is the patient/caregiver able to teach back importance of completing antibiotic  course of treatment?  Yes   Week 2 call completed?  Yes          Mariel Albrecht RN

## 2020-08-23 NOTE — OUTREACH NOTE
COPD/PN Week 2 Survey      Responses   [facility]   COVID-19 Test Status  Confirmed   Does the patient have one of the following disease processes/diagnoses(primary or secondary)?  COPD/Pneumonia   Was the primary reason for admission:  Pneumonia   Week 2 attempt successful?  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  Yes   List who call center can speak with   or any caregiver   Person spoke with today (if not patient) and relationship  [place]-     Meds reviewed with patient/caregiver?  Yes   Is the patient having any side effects they believe may be caused by any medication additions or changes?  No   Does the patient have all medications ordered at discharge?  Yes   Is the patient taking all medications as directed (includes completed medication regime)?  Yes   Does the patient have a primary care provider?   Yes   Does the patient have an appointment with their PCP or pulmonologist within 7 days of discharge?  Yes   Has the patient kept scheduled appointments due by today?  Yes   What is the Home health agency?    [facility]    Has home health visited the patient within 72 hours of discharge?  Yes   Home health comments   nurse will visit today.   What DME was ordered?  [facility] bed and O2   Has all DME been delivered?  Yes   Pulse Ox monitoring  Intermittent   Pulse Ox device source  Patient   O2 Sat comments   reports oxygen saturations are 98% on room air.    O2 Sat: education provided  Sat levels, Monitoring frequency, When to seek care   Psychosocial issues?  No   Psychosocial comments  Now has 2 caregivers who stay with them around the clock.   Comments   reports pt is making improvements and denies any SOA, fever, or chills.    Did the patient receive a copy of their  discharge instructions?  Yes   Nursing interventions  Reviewed instructions with patient   What is the patient's perception of their health status since discharge?  Improving   Nursing Interventions  Nurse provided patient education   Is the patient/caregiver able to teach back the hierarchy of who to call/visit for symptoms/problems? PCP, Specialist, Home health nurse, Urgent Care, ED, 911  Yes   Is the patient/caregiver able to teach back signs and symptoms of worsening condition:  Fever/chills, Shortness of breath, Chest pain   Is the patient/caregiver able to teach back importance of completing antibiotic course of treatment?  Yes   Week 2 call completed?  Yes          Que Carrillo, RN

## 2020-08-30 NOTE — OUTREACH NOTE
COPD/PN Week 3 Survey      Responses   [facility]   COVID-19 Test Status  Confirmed   Does the patient have one of the following disease processes/diagnoses(primary or secondary)?  COPD/Pneumonia   Was the primary reason for admission:  Pneumonia   Week 3 attempt successful?  Yes   Call start time  1149   Call end time  1159   General alerts for this patient   is a MD    Discharge diagnosis  pneumonia, Covid 19   Is patient permission given to speak with other caregiver?  Yes   List who call center can speak with   or any caregiver   Person spoke with today (if not patient) and relationship  [place]-     Meds reviewed with patient/caregiver?  Yes   Is the patient having any side effects they believe may be caused by any medication additions or changes?  No   Does the patient have all medications ordered at discharge?  Yes   Is the patient taking all medications as directed (includes completed medication regime)?  Yes   Does the patient have a primary care provider?   Yes   Does the patient have an appointment with their PCP or pulmonologist within 7 days of discharge?  No   Nursing Interventions  Educated patient on importance of making appointment, Advised patient to make appointment   Has the patient kept scheduled appointments due by today?  N/A   What is the Home health agency?    [facility]    Has home health visited the patient within 72 hours of discharge?  Yes   Home health comments   nurse will visit today.   What DME was ordered?  [facility] bed and O2   Has all DME been delivered?  Yes   Pulse Ox monitoring  Intermittent   Pulse Ox device source  Patient   O2 Sat comments  reports oxygen saturations are 92-95 on oxygen, and drops to 88% when off oxygen.    O2 Sat: education provided  Sat levels, Monitoring frequency, When to seek care   O2 Sat education comments  Advised to wear oxygen at all times.    Psychosocial issues?  No    Psychosocial comments  Now has 2 caregivers who stay with them around the clock.   Comments   reports pt is making improvements and denies any SOA, fever, or chills.    Did the patient receive a copy of their discharge instructions?  Yes   Nursing interventions  Reviewed instructions with patient   What is the patient's perception of their health status since discharge?  Improving   Nursing Interventions  Nurse provided patient education   Is the patient/caregiver able to teach back the hierarchy of who to call/visit for symptoms/problems? PCP, Specialist, Home health nurse, Urgent Care, ED, 911  Yes   Is the patient/caregiver able to teach back signs and symptoms of worsening condition:  Fever/chills, Shortness of breath, Chest pain   Is the patient/caregiver able to teach back importance of completing antibiotic course of treatment?  Yes   Week 3 call completed?  Yes          Que Carrillo RN

## 2020-09-07 NOTE — OUTREACH NOTE
"COPD/PN Week 4 Survey      Responses   [facility] patient discharged from?  [place]   COVID-19 Test Status  Confirmed   Does the patient have one of the following disease processes/diagnoses(primary or secondary)?  COPD/Pneumonia   Was the primary reason for admission:  Pneumonia   Week 4 attempt successful?  Yes   Call start time  1146   Call end time  1156   Is patient permission given to speak with other caregiver?  Yes   Person spoke with today (if not patient) and relationship  [place]-   Meds reviewed with patient/caregiver?  Yes   Is the patient having any side effects they believe may be caused by any medication additions or changes?  No   Is the patient taking all medications as directed (includes completed medication regime)?  Yes   Has the patient kept scheduled appointments due by today?  Yes   Is the patient still receiving Home Health Services?  Yes   DME comments  Has lift chair arriving 09/10/20.  states  is working on getting a lift for patient.   Pulse Ox monitoring  Intermittent   Pulse Ox device source  Patient   O2 Sat comments   states O2 sats staying up \"pretty well\" over 92%.   Psychosocial issues?  No   What is the patient's perception of their health status since discharge?  Improving   Is the patient/caregiver able to teach back the hierarchy of who to call/visit for symptoms/problems? PCP, Specialist, Home health nurse, Urgent Care, ED, 911  Yes   Is the patient/caregiver able to teach back signs and symptoms of worsening condition:  Fever/chills, Shortness of breath, Chest pain   Week 4 call completed?  Yes   Would the patient like one additional call?  No   Graduated  Yes   Wrap up additional comments   states is doing as well as expected. States eating well-denies any fever, cough, SOA or chest pain. States  nurse will return on Wednesday. Denies any needs today.          Fany Lopez RN  "

## 2020-09-16 NOTE — TELEPHONE ENCOUNTER
Suzanne Dan, Carla Rep  [facility]   Phone Number: [phone]             Pt  called, wife needs medication but this medication does not have a generic stated MD called in a substitute but pharmacy is telling him they have not recieved         MOVANTIK 25 MG tablet .         Pharmacy:  [id number] [address] - [phone]  - [phone] FX   Phone:  [phone]   Fax:  [phone]   Address:  [address], [facility] [address]

## 2020-10-09 NOTE — ED PROVIDER NOTES
EMERGENCY DEPARTMENT ENCOUNTER    Room Number:  15/15  Date of encounter:  10/9/2020  PCP: Arian Moyer MD    HPI:  Context: Yeni Jimenez is a 87 y.o. female who presents to the ED c/o chief complaint of generalized weakness.  Patient has a history of dementia, unable to provide history, history limited.  Patient brought in by EMS port.  Per report, patient lives at home with her , both tested positive for COVID 2 weeks prior.  Patient sent in for generalized weakness.  No family available for further history.    MEDICAL HISTORY REVIEW  Reviewed in EPIC    PAST MEDICAL HISTORY  Active Ambulatory Problems     Diagnosis Date Noted   • Hypokalemia 08/19/2019   • Rheumatoid arthritis (CMS/[facility]) 08/19/2019   • Essential hypertension 08/19/2019   • Hypocalcemia 08/19/2019   • Generalized weakness 08/19/2019   • Hypomagnesemia 08/19/2019   • HCAP (healthcare-associated pneumonia) 08/24/2019   • Hypophosphatemia 08/24/2019   • Sepsis due to pneumonia (CMS/[facility]) 08/25/2019   • Pneumonia due to gram-negative bacteria (CMS/[facility]) 08/26/2019   • Altered mental status 05/18/2020   • Acute pain of left knee 05/21/2020   • Hypothyroidism (acquired) 05/21/2020   • Osteoarthritis 05/21/2020   • Chronic back pain 05/21/2020   • Anxiety disorder 05/21/2020   • Community acquired pneumonia of right upper lobe of lung 07/25/2020   • Rheumatoid arthritis involving multiple sites (CMS/[facility]) 07/26/2020   • Polypharmacy 07/26/2020   • Elevated lactic acid level 07/26/2020   • 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   • Acute respiratory failure with hypoxia (CMS/[facility]) 07/26/2020   • Asthma exacerbation 08/02/2020   • Aspiration pneumonia of right lung due to vomit (CMS/[facility]) 08/03/2020   • Acute on chronic combined systolic and diastolic CHF (congestive heart failure) (CMS/[facility]) 08/03/2020   • Pneumonia due to COVID-19 virus 08/04/2020     Resolved Ambulatory Problems     Diagnosis Date Noted   • No Resolved Ambulatory  Problems     Past Medical History:   Diagnosis Date   • Anxiety    • Arthritis    • Disease of thyroid gland    • Hypertension    • Hypothyroidism    • Left bundle branch block    • Osteoporosis    • Palpitation        PAST SURGICAL HISTORY  Past Surgical History:   Procedure Laterality Date   • COLON RESECTION WITH COLOSTOMY     • COLONOSCOPY     • COLOSTOMY CLOSURE     • SIGMOIDOSCOPY     • TONSILLECTOMY         FAMILY HISTORY  Family History   Problem Relation Age of Onset   • Stomach cancer Paternal Grandmother    • Cancer Mother    • Stroke Father    • Aneurysm Father    • Diabetes Sister        SOCIAL HISTORY  Social History     Socioeconomic History   • Marital status:      Spouse name: Not on file   • Number of children: Not on file   • Years of education: Not on file   • Highest education level: Not on file   Tobacco Use   • Smoking status: Never Smoker   • Smokeless tobacco: Never Used   • Tobacco comment: caff use   Substance and Sexual Activity   • Alcohol use: No   • Drug use: No   • Sexual activity: Defer       ALLERGIES  Marigold [calendula officinalis (marigold)], Sulfa antibiotics, and Sulfites    The patient's allergies have been reviewed    REVIEW OF SYSTEMS  Unable to obtain history secondary to altered mental status/dementia  PHYSICAL EXAM  I have reviewed the triage vital signs and nursing notes.  ED Triage Vitals [10/09/20 1023]   Temp Heart Rate Resp BP SpO2   -- 76 -- 127/62 100 %      Temp src Heart Rate Source Patient Position BP Location FiO2 (%)   -- -- -- -- --     GENERAL: No acute distress  HENT: NCAT, PERRL, Nares patent  EYES: no scleral icterus  NECK: trachea midline, no ROM limitations  CV: regular rhythm, regular rate  RESPIRATORY: normal effort, trace crackles at lung bases  ABDOMEN: soft, nondistended, nontender palpation : deferred  MUSCULOSKELETAL: no deformity  NEURO: alert and oriented to place only, no facial droop, speech clear and fluid but repetitive  questioning, repeatedly asked my name and where she is, moves all extremities, sensation intact light touch all extremities, follows commands, no focal deficits  SKIN: warm, dry    LAB RESULTS  Recent Results (from the past 24 hour(s))   CBC Auto Differential    Collection Time: 10/08/20  4:45 PM    Specimen: Blood   Result Value Ref Range    WBC 16.12 (H) 3.40 - 10.80 10*3/mm3    RBC 2.51 (L) 3.77 - 5.28 10*6/mm3    Hemoglobin 7.8 (L) 12.0 - 15.9 g/dL    Hematocrit 24.0 (L) 34.0 - 46.6 %    MCV 95.6 79.0 - 97.0 fL    MCH 31.1 26.6 - 33.0 pg    MCHC 32.5 31.5 - 35.7 g/dL    RDW 14.7 12.3 - 15.4 %    RDW-SD 50.6 37.0 - 54.0 fl    MPV 10.9 6.0 - 12.0 fL    Platelets 281 140 - 450 10*3/mm3    Neutrophil % 90.5 (H) 42.7 - 76.0 %    Lymphocyte % 4.1 (L) 19.6 - 45.3 %    Monocyte % 3.6 (L) 5.0 - 12.0 %    Eosinophil % 0.1 (L) 0.3 - 6.2 %    Basophil % 0.2 0.0 - 1.5 %    Immature Grans % 1.5 (H) 0.0 - 0.5 %    Neutrophils, Absolute 14.59 (H) 1.70 - 7.00 10*3/mm3    Lymphocytes, Absolute 0.66 (L) 0.70 - 3.10 10*3/mm3    Monocytes, Absolute 0.58 0.10 - 0.90 10*3/mm3    Eosinophils, Absolute 0.01 0.00 - 0.40 10*3/mm3    Basophils, Absolute 0.04 0.00 - 0.20 10*3/mm3    Immature Grans, Absolute 0.24 (H) 0.00 - 0.05 10*3/mm3    nRBC 0.1 0.0 - 0.2 /100 WBC   Respiratory Panel PCR w/COVID-19(SARS-CoV-2) [facility]ILY/[facility] In-House, NP Swab in UTM/VTM, 3-4 HR TAT - Swab, Nasopharynx    Collection Time: 10/09/20 11:58 AM    Specimen: Nasopharynx; Swab   Result Value Ref Range    ADENOVIRUS, PCR Not Detected Not Detected    Coronavirus 229E Not Detected Not Detected    Coronavirus HKU1 Not Detected Not Detected    Coronavirus NL63 Not Detected Not Detected    Coronavirus OC43 Not Detected Not Detected    COVID19 Not Detected Not Detected - Ref. Range    Human Metapneumovirus Not Detected Not Detected    Human Rhinovirus/Enterovirus Not Detected Not Detected    Influenza A PCR Not Detected Not Detected    Influenza A H1 Not  Detected Not Detected    Influenza A H1 2009 PCR Not Detected Not Detected    Influenza A H3 Not Detected Not Detected    Influenza B PCR Not Detected Not Detected    Parainfluenza Virus 1 Not Detected Not Detected    Parainfluenza Virus 2 Not Detected Not Detected    Parainfluenza Virus 3 Not Detected Not Detected    Parainfluenza Virus 4 Not Detected Not Detected    RSV, PCR Not Detected Not Detected    Bordetella pertussis pcr Not Detected Not Detected    Bordetella parapertussis PCR Not Detected Not Detected    Chlamydophila pneumoniae PCR Not Detected Not Detected    Mycoplasma pneumo by PCR Not Detected Not Detected   Comprehensive Metabolic Panel    Collection Time: 10/09/20 12:57 PM    Specimen: Blood   Result Value Ref Range    Glucose 87 65 - 99 mg/dL    BUN 14 8 - 23 mg/dL    Creatinine 0.39 (L) 0.57 - 1.00 mg/dL    Sodium 135 (L) 136 - 145 mmol/L    Potassium 4.4 3.5 - 5.2 mmol/L    Chloride 104 98 - 107 mmol/L    CO2 22.2 22.0 - 29.0 mmol/L    Calcium 8.5 (L) 8.6 - 10.5 mg/dL    Total Protein 5.0 (L) 6.0 - 8.5 g/dL    Albumin 2.10 (L) 3.50 - 5.20 g/dL    ALT (SGPT) 11 1 - 33 U/L    AST (SGOT) 16 1 - 32 U/L    Alkaline Phosphatase 148 (H) 39 - 117 U/L    Total Bilirubin 0.3 0.0 - 1.2 mg/dL    eGFR Non African Amer >150 >60 mL/min/1.73    Globulin 2.9 gm/dL    A/G Ratio 0.7 g/dL    BUN/Creatinine Ratio 35.9 (H) 7.0 - 25.0    Anion Gap 8.8 5.0 - 15.0 mmol/L   Lactate Dehydrogenase    Collection Time: 10/09/20 12:57 PM    Specimen: Blood   Result Value Ref Range     (H) 135 - 214 U/L   Procalcitonin    Collection Time: 10/09/20 12:57 PM    Specimen: Blood   Result Value Ref Range    Procalcitonin 0.11 0.00 - 0.25 ng/mL   C-reactive Protein    Collection Time: 10/09/20 12:57 PM    Specimen: Blood   Result Value Ref Range    C-Reactive Protein 3.26 (H) 0.00 - 0.50 mg/dL   Troponin    Collection Time: 10/09/20 12:57 PM    Specimen: Blood   Result Value Ref Range    Troponin T 0.040 (C) 0.000 - 0.030  ng/mL   BNP    Collection Time: 10/09/20 12:57 PM    Specimen: Blood   Result Value Ref Range    proBNP 3,583.0 (H) 0.0-1,800.0 pg/mL   Lactic Acid, Plasma    Collection Time: 10/09/20 12:57 PM    Specimen: Blood   Result Value Ref Range    Lactate 1.5 0.5 - 2.0 mmol/L   Magnesium    Collection Time: 10/09/20 12:57 PM    Specimen: Blood   Result Value Ref Range    Magnesium 1.9 1.6 - 2.4 mg/dL   T4, Free    Collection Time: 10/09/20 12:57 PM    Specimen: Blood   Result Value Ref Range    Free T4 0.77 (L) 0.93 - 1.70 ng/dL   TSH    Collection Time: 10/09/20 12:57 PM    Specimen: Blood   Result Value Ref Range    TSH 35.400 (H) 0.270 - 4.200 uIU/mL   Type & Screen    Collection Time: 10/09/20  1:09 PM    Specimen: Blood   Result Value Ref Range    ABO Type A     RH type Positive     Antibody Screen Negative     T&S Expiration Date 10/12/2020 11:59:59 PM    Urinalysis With Microscopic If Indicated (No Culture) - Urine, Catheter    Collection Time: 10/09/20  1:25 PM    Specimen: Urine, Catheter   Result Value Ref Range    Color, UA Dark Yellow (A) Yellow, Straw    Appearance, UA Clear Clear    pH, UA 5.5 5.0 - 8.0    Specific Gravity, UA 1.029 1.005 - 1.030    Glucose, UA Negative Negative    Ketones, UA Trace (A) Negative    Bilirubin, UA Negative Negative    Blood, UA Negative Negative    Protein, UA Trace (A) Negative    Leuk Esterase, UA Negative Negative    Nitrite, UA Negative Negative    Urobilinogen, UA 1.0 E.U./dL 0.2 - 1.0 E.U./dL   CBC Auto Differential    Collection Time: 10/09/20  3:25 PM    Specimen: Blood   Result Value Ref Range    WBC 15.29 (H) 3.40 - 10.80 10*3/mm3    RBC 2.56 (L) 3.77 - 5.28 10*6/mm3    Hemoglobin 7.9 (L) 12.0 - 15.9 g/dL    Hematocrit 24.4 (L) 34.0 - 46.6 %    MCV 95.3 79.0 - 97.0 fL    MCH 30.9 26.6 - 33.0 pg    MCHC 32.4 31.5 - 35.7 g/dL    RDW 14.6 12.3 - 15.4 %    RDW-SD 51.1 37.0 - 54.0 fl    MPV 10.3 6.0 - 12.0 fL    Platelets 270 140 - 450 10*3/mm3    Neutrophil % 76.1 (H)  42.7 - 76.0 %    Lymphocyte % 10.6 (L) 19.6 - 45.3 %    Monocyte % 8.4 5.0 - 12.0 %    Eosinophil % 0.9 0.3 - 6.2 %    Basophil % 0.6 0.0 - 1.5 %    Immature Grans % 3.4 (H) 0.0 - 0.5 %    Neutrophils, Absolute 11.64 (H) 1.70 - 7.00 10*3/mm3    Lymphocytes, Absolute 1.62 0.70 - 3.10 10*3/mm3    Monocytes, Absolute 1.29 (H) 0.10 - 0.90 10*3/mm3    Eosinophils, Absolute 0.13 0.00 - 0.40 10*3/mm3    Basophils, Absolute 0.09 0.00 - 0.20 10*3/mm3    Immature Grans, Absolute 0.52 (H) 0.00 - 0.05 10*3/mm3    nRBC 0.1 0.0 - 0.2 /100 WBC       I ordered the above labs and reviewed the results.    RADIOLOGY  Xr Chest Ap    Result Date: 10/9/2020  XR CHEST AP-  10/09/2020  HISTORY: Cough fever. Covid evaluation.  Heart size is within normal limits. The lungs have partially cleared since the previous study of 07/28/2020 with only some minimal scar atelectasis or inflammatory change in the right infrahilar region with a small somewhat stellate density in the left upper lobe which may represent atelectasis or inflammatory change. A skinfold overlies the left hemithorax. There is mild soft tissue thickening of the superior mediastinum stable since previous study of 07/28/2020.      There is been significant clearing of the bilateral lungs since 07/28/2020 particularly on the right. 2. There is mild right infrahilar and left upper lobe probable atelectasis or inflammatory change. 3. Prominent soft tissue in the superior mediastinum appears similar to the 07/28/2020 study but further evaluation may be helpful. 4. Consider short-term follow-up chest radiograph or CT of the chest with contrast for further evaluation.  This report was finalized on 10/9/2020 11:46 AM by Dr. Joaquín Prather M.D.        I ordered the above noted radiological studies. I reviewed the images and results. I agree with the radiologist interpretation.    PROCEDURES  Procedures    MEDICATIONS GIVEN IN ER  Medications   sodium chloride 0.9 % bolus 500 mL (500  mL Intravenous New Bag 10/9/20 9783)       PROGRESS, DATA ANALYSIS, CONSULTS, AND MEDICAL DECISION MAKING  A complete history and physical exam have been performed.  All available laboratory and imaging results have been reviewed by myself prior to disposition.  Patient was placed in face mask in first look. Patient was wearing facemask when I entered the room and throughout our encounter. I wore full protective equipment throughout this patient encounter including a face mask, and gloves. Hand hygiene was performed before donning protective equipment and after removal when leaving the room.  MDM  After the initial H&P, I discussed pertinent information from history and physical exam with patient/family.  Discussed differential diagnosis.  Discussed plan for ED evaluation/work-up/treatment.  All questions answered.  Patient/family is agreeable with plan.  ED Course as of Oct 09 1746   Fri Oct 09, 2020   1116 EKG independently viewed and contemporaneously interpreted by ED physician. Time: 10:44 AM.  Rate 76.  Interpretation: Normal sinus rhythm, normal axis, left bundle branch block, appropriate discordant ST changes, Sgarbossa criteria negative.  No significant change from prior 7/28/2020.    [JG]   1118 Patient was placed in face mask in first look. Patient was wearing facemask when I entered the room and throughout our encounter. I wore full protective equipment throughout this patient encounter including a face mask, eye shield, gown and gloves. Hand hygiene was performed before donning protective equipment and after removal when leaving the room.        [JG]   1147 I spoke with patient's caregiver over the phone.  Patient has a history of dementia, alert and oriented only to person.  Patient was reportedly COVID -3 months ago, last tested on Thursday, negative at that time.  Patient had home health nursing who had come by several times and attempted to obtain routine blood work, patient was felt to be  dehydrated, patient seen by Dr. Larios, primary care physician yesterday, blood work was obtained.  Per report, patient's previous hemoglobin was 11, had decreased to 7.  Patient sent in for possible blood transfusion.    [JG]   1220 Rectal exam performed.  Chaperone present throughout exam, Marlin, ED nurse  Multiple external hemorrhoids, nonthrombosed, nonbleeding, normal stool on digital rectal exam.  Hemoccult negative,  Pass.    [JG]   1334 Patient Kovic test is negative here today.  Per initial report patient was recently COVID positive by caregiver reports that patient was COVID +3 months prior, had cleared infection, was recently tested for COVID and is negative.  Reassured by COVID negative test here today.  Discontinuing isolation.    [JG]   1415 Troponin minimally elevated.  EKG shows no acute findings.  Comparison to prior shows troponin decline.  Not consistent with ACS.    [JG]   1515 Chest x-ray abnormal, possible inflammatory changes.  Patient afebrile, vital signs stable, lactic acid negative, procalcitonin normal.  Unlikely to be infection.  Patient will need outpatient follow-up for CT imaging for further evaluation.    [JG]   1557 Caregiver at bedside, discussed ED work-up and results up to present.  Discussed abnormal chest x-ray, discussed need for repeat evaluation as an outpatient.  Discussed currently pending CBC.  Work-up so far is significant for hypothyroidism but this can be managed outpatient.  If patient is severely anemic, requires transfusion, will require hospitalization.  If CBC is reassuring, patient would be appropriate for discharge.  Caregiver agrees with plan.  Caregiver reports that patient's  would like her to be discharged home if it is safe.    [JG]   1614 CBC shows leukocytosis but infectious work-up negative.  Hemoglobin is 7.9.  Patient has no signs of GI bleed, caregiver denies any signs of blood loss.  Patient does not meet criteria for  transfusion as not having symptomatic anemia.  Anemia likely secondary to extremely poor nutritional status.  Discussed this with caregiver and need to ensure supplemental nutrition.  Plan for discharge with primary care follow-up.    [JG]   1730 Phone call with patient's  who is a retired physician.  I discussed the patient's ED work-up , course, and results.  I discussed that although she is anemic she does not meet criteria for transfusion.  I did discuss that she was noted to have leukocytosis but there was no clear infectious process.  I told him that blood cultures were obtained and would be watch for any signs of bacteremia.  I discussed her abnormal chest x-ray that would need further evaluation as an outpatient.  I offered admission for further evaluation.  After extensive discussion of the risks and benefits, patient's  declines admission, is comfortable with discharge.  I told him that she is free to return to the emergency department at any time.  I encouraged close follow-up with her primary care physician.  Extensive discussion of return precautions were given.  Patient's  is comfortable with plan.    [JG]      ED Course User Index  [JG] Wyatt Dennison MD       AS OF 16:17 EDT VITALS:    BP - 104/70  HR - 87  TEMP - 95.7 °F (35.4 °C) (Tympanic)  O2 SATS - 100%    DIAGNOSIS  Final diagnoses:   Hypothyroidism, unspecified type   Very poor nutrition   Dementia without behavioral disturbance, unspecified dementia type (CMS/HCC)   Abnormal CXR   Anemia, unspecified type         DISPOSITION  DISCHARGE    Patient discharged in stable condition.    Reviewed implications of results, diagnosis, meds, responsibility to follow up, warning signs and symptoms of possible worsening, potential complications and reasons to return to ER.    Patient/Family voiced understanding of above instructions.    Discussed plan for discharge, as there is no emergent indication for admission. Patient  referred to primary care provider for BP management due to today's BP. Pt/family is agreeable and understands need for follow up and repeat testing.  Pt is aware that discharge does not mean that nothing is wrong but it indicates no emergency is present that requires admission and they must continue care with follow-up as given below or physician of their choice.     FOLLOW-UP  Arian Moyer MD  [facility] [place] [address] [place] [address]  [phone]    Schedule an appointment as soon as possible for a visit in 2 days  even if well, and to arrange for further evaluation of abnormal chest x-ray with outpatient imaging.         Medication List      No changes were made to your prescriptions during this visit.          Wyatt Dennison MD  10/09/20 1610       Wyatt Dennison MD  10/09/20 1077

## 2020-10-09 NOTE — ED NOTES
Pts daughter updated via phone on pt condition and status. Daughter informed that pt is due to be discharged and we will arrange for ride back. Pts daughter concerned about her being discharged based on her labs.      Radha Mars RN  10/09/20 0236

## 2020-10-09 NOTE — ED NOTES
"Pt continuously asking \"where am I?, WHo are you?, call the police.\" Pt is confused, does not answer questions (unsure if related to hard of hearing or mental status), does not follow commands; attempted iv placement, unsuccessful d/t difficult stick. IV therapy paged     Ninoska Duncan, RN  10/09/20 1200    "

## 2020-10-09 NOTE — ED NOTES
IV team paged for IV. Pt stuck 3 times by this RN and 2 times by another RN.      Radha Mars, RN  10/09/20 1297

## 2020-10-09 NOTE — ED NOTES
Contacted infection control contacted on pt regarding pt previous covid positive.      Radha Mars, RN  10/09/20 7510

## 2020-10-09 NOTE — ED NOTES
.Pt masked, RN wearing mask, goggles, faceshield, hair covering, gown, and gloves during encounter.       Ninoska Duncan, RN  10/09/20 8808

## 2020-10-09 NOTE — ED NOTES
"Paged IV team again for IV. States she \"will be here as soon as I can.\"     Radha Mars RN  10/09/20 9058    "

## 2020-10-09 NOTE — ED TRIAGE NOTES
Pt arrived via ems with complaints of general weakness with unknown onset. Pt has a hx of dementia. Pt lives at home with  both tested positive for covid 2wks ago.     Pt was wearing a mask during assessment.  This RN wore appropriate PPE

## 2020-10-09 NOTE — ED NOTES
PT wearing mask. RN performs hand hygeine before and during pt encounter. RN wearing mask, eye protection and gloves during encounter.       Naomi Mart, RN  10/09/20 4407

## 2020-10-21 PROBLEM — R65.20 SEVERE SEPSIS (HCC): Status: ACTIVE | Noted: 2020-01-01

## 2020-10-21 PROBLEM — A41.9 SEVERE SEPSIS (HCC): Status: ACTIVE | Noted: 2020-01-01

## 2020-10-23 LAB — BACTERIA BLD CULT: ABNORMAL

## 2020-10-25 LAB
BACTERIA SPEC AEROBE CULT: ABNORMAL
GRAM STN SPEC: ABNORMAL
ISOLATED FROM: ABNORMAL

## 2020-11-02 NOTE — DISCHARGE SUMMARY
[facility]    Admit date: 10/21/2020  Discharge date: 10/22/2020    Admission/discharge diagnosis:  1. Sepsis with septic shock --   2. Bacteremia  3. UTI  4. Pneumonia  5. Decubitus ulcer   6. Anemia  7. Hypothyroidism  8. LANG --   9. Encephalopathy with dementia  10. Dysphagia --   11. Rheumatoid arthritis  12. Chronic systolic chf    HPI: reviewed as per Dr. White:  Yeni Jimenez is a 87 y.o. female with history of rheumatoid arthritis, hypertension hypocalcemia, history of pneumonias in the past, hypothyroidism, Acute on chronic combined systolic and diastolic congestive heart failure, tachybradycardia syndrome, asthma, history of aspiration who presented to the emergency room with the chief complaint of generalized weakness.  She has underlying dementia, unable to provide any history at the time of presentation became even more lethargic at the time of my assessment.  She was brought by EMS, lives at home with her  who is a retired physician and both tested positive for COVID-19 several weeks ago.  Patient is noted to be on steroids for her rheumatoid arthritis, and she was hypotensive, was bolused with IV fluid, and was started on the antibiotic however her blood pressure was still low.  She does have sulfite allergy and every pressor we have that can be given has sulfite in it as a preservative.  This was discussed with the pharmacy and with the ER physician, her reported reaction was throat swelling.  Since she is on steroids she was given stress dose of Solu-Cortef and she was also given Benadryl in preparation for any possible need for pressors since she was already maxed out on the amount of fluid that can be given.    Hospital Course;   Mrs. Jimenez was in continued distress and continued to be very ill, conversation was had with  and he opted for DNR/DNI, shortly after this patient had pea arrest.

## 2025-03-17 NOTE — PROGRESS NOTES
Subjective:     Encounter Date:10/12/2018      Patient ID: Yeni Jimenez is a 85 y.o. female.    Chief Complaint: palpitations  History of Present Illness  Dear Dr. Moyer,    I had the pleasure of seeing this patient in the office today for initial evaluation and consultation.  She comes in for evaluation of palpitations.  I appreciate the you sent her to see us.    She has previously followed with Dr. Christianson.  She saw him in April of this year.  Evaluation at that time was largely unremarkable; she did complain of some increasing fatigue at that time.  She had an echocardiogram performed in December 2013 and this demonstrated normal biventricular size and function.  She had an injection fraction of 60-65 percent.  She also wore an event monitor March 2017 that showed sinus rhythm with a interventricular conduction delay.  No significant arrhythmia was seen. She has followed with a cardiologist in [place] due to a heart murmur.    She then had another event monitor in 8/2017:  Interpretation Summary        · An abnormal monitor study.     Sinus rhythm with frequent (16%) PACs.     She states that she does have a rare palpitations.  No tachycardia.  Occasionally she feels a little dizzy if she stands up fast; she came in today in a wheelchair and spends most of her time sitting.  She has not had any complaints of chest pain, pressure, tightness, squeezing, or heartburn.  Occasionally she has some lower extremity edema, which is been attributed to the fact that she is usually in a wheelchair or sitting.  She's not been hospitalized recently.          The following portions of the patient's history were reviewed and updated as appropriate: allergies, current medications, past family history, past medical history, past social history, past surgical history and problem list.    Past Medical History:   Diagnosis Date   • Anxiety    • Arthritis    • Chronic back pain    • Disease of thyroid gland    •  Patient phoned, VML for patient  [employer] message also sent with results    Encounter closed   Hypertension    • Hypothyroidism    • Left bundle branch block    • Osteoarthritis    • Osteoporosis    • Palpitation    • Rheumatoid arthritis (CMS/HCC)    • 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HCC)        Past Surgical History:   Procedure Laterality Date   • COLON RESECTION WITH COLOSTOMY     • COLONOSCOPY     • COLOSTOMY CLOSURE     • SIGMOIDOSCOPY     • TONSILLECTOMY         Social History     Social History   • Marital status:      Spouse name: N/A   • Number of children: N/A   • Years of education: N/A     Occupational History   • Not on file.     Social History Main Topics   • Smoking status: Never Smoker   • Smokeless tobacco: Not on file      Comment: caff use   • Alcohol use No   • Drug use: No   • Sexual activity: Not on file     Other Topics Concern   • Not on file     Social History Narrative   • No narrative on file       Review of Systems   Constitution: Positive for weakness and malaise/fatigue. Negative for chills, decreased appetite, fever and night sweats.   HENT: Negative for ear discharge, ear pain, hearing loss, nosebleeds and sore throat.    Eyes: Negative for blurred vision, double vision and pain.   Cardiovascular: Negative for cyanosis.   Respiratory: Negative for hemoptysis and sputum production.    Endocrine: Negative for cold intolerance and heat intolerance.   Hematologic/Lymphatic: Negative for adenopathy.   Skin: Negative for dry skin, itching, nail changes, rash and suspicious lesions.   Musculoskeletal: Negative for arthritis, gout, muscle cramps, muscle weakness, myalgias and neck pain.   Gastrointestinal: Negative for anorexia, bowel incontinence, constipation, diarrhea, dysphagia, hematemesis and jaundice.   Genitourinary: Negative for bladder incontinence, dysuria, flank pain, frequency, hematuria and nocturia.   Neurological: Negative for focal weakness, numbness, paresthesias and seizures.   Psychiatric/Behavioral: Negative for altered mental status, hallucinations,  "hypervigilance, suicidal ideas and thoughts of violence.   Allergic/Immunologic: Negative for persistent infections.         ECG 12 Lead  Date/Time: 10/12/2018 2:52 PM  Performed by: EVE YOUNG III.  Authorized by: EVE YOUNG III   Comparison: compared with previous ECG   Similar to previous ECG  Rhythm: sinus rhythm  Rate: normal  Conduction: non-specific intraventricular conduction delay  ST Segments: ST segments normal  T Waves: T waves normal  QRS axis: normal  Other: no other findings  Clinical impression: non-specific ECG               Objective:     Vitals:    10/12/18 1426   BP: 116/76   BP Location: Left arm   Patient Position: Sitting   Pulse: 65   Weight: 48.1 kg (106 lb)   Height: 152.4 cm (60\")         Physical Exam   Constitutional: She is oriented to person, place, and time. She appears well-developed and well-nourished. No distress.   HENT:   Head: Normocephalic and atraumatic.   Nose: Nose normal.   Mouth/Throat: Oropharynx is clear and moist.   Eyes: Pupils are equal, round, and reactive to light. Conjunctivae and EOM are normal. Right eye exhibits no discharge. Left eye exhibits no discharge.   Neck: Normal range of motion. Neck supple. No tracheal deviation present. No thyromegaly present.   Cardiovascular: Normal rate, regular rhythm, S1 normal, S2 normal, normal heart sounds and normal pulses.  Exam reveals no S3.    Pulmonary/Chest: Effort normal and breath sounds normal. No stridor. No respiratory distress. She exhibits no tenderness.   Abdominal: Soft. Bowel sounds are normal. She exhibits no distension and no mass. There is no tenderness. There is no rebound and no guarding.   Musculoskeletal: Normal range of motion. She exhibits no tenderness or deformity.   Lymphadenopathy:     She has no cervical adenopathy.   Neurological: She is alert and oriented to person, place, and time. She has normal reflexes.   Skin: Skin is warm and dry. No rash noted. She is not diaphoretic. No " erythema.   Psychiatric: She has a normal mood and affect. Thought content normal.       Lab Review:             Performed        Assessment:          Diagnosis Plan   1. Palpitations  ECG 12 Lead   2. IVCD (intraventricular conduction defect)  ECG 12 Lead          Plan:       1.  Palpitations-patient is having rare palpitations.  She's fairly asymptomatic with this.  2.  Interventricular conduction delay, old, follow    Thank you very much for allowing us to participate in the care of this pleasant patient.  Please don't hesitate to call if I can be of assistance in any way.      Current Outpatient Prescriptions:   •  albuterol (PROVENTIL HFA;VENTOLIN HFA) 108 (90 BASE) MCG/ACT inhaler, Inhale 2 puffs every 4 (four) hours as needed for wheezing., Disp: 1 inhaler, Rfl: 2  •  ALPRAZolam (XANAX) 1 MG tablet, Take  by mouth., Disp: , Rfl:   •  amLODIPine (NORVASC) 5 MG tablet, Take 5 mg by mouth 2 (Two) Times a Day., Disp: , Rfl:   •  ascorbic acid (VITAMIN C) 1000 MG tablet, Take  by mouth., Disp: , Rfl:   •  Cholecalciferol (VITAMIN D-3) 1000 UNITS capsule, Take  by mouth., Disp: , Rfl:   •  cycloSPORINE (RESTASIS) 0.05 % ophthalmic emulsion, Apply  to eye., Disp: , Rfl:   •  eszopiclone (LUNESTA) 2 MG tablet, Take  by mouth., Disp: , Rfl:   •  lansoprazole (PREVACID) 30 MG capsule, Take  by mouth., Disp: , Rfl:   •  leflunomide (ARAVA) 20 MG tablet, Take  by mouth., Disp: , Rfl:   •  levothyroxine (SYNTHROID, LEVOTHROID) 100 MCG tablet, Take  by mouth., Disp: , Rfl:   •  losartan (COZAAR) 50 MG tablet, Take 50 mg by mouth 2 (Two) Times a Day., Disp: , Rfl:   •  lubiprostone (AMITIZA) 24 MCG capsule, Take 1 capsule by mouth 2 (Two) Times a Day With Meals., Disp: 60 capsule, Rfl: 3  •  metoprolol succinate XL (TOPROL-XL) 25 MG 24 hr tablet, Take  by mouth., Disp: , Rfl:   •  MOVANTIK 25 MG tablet, TAKE 1 TABLET BY MOUTH DAILY, Disp: 30 tablet, Rfl: 5  •  Multiple Minerals-Vitamins (CALCIUM CITRATE +) tablet, Take  by  mouth., Disp: , Rfl:   •  Multiple Vitamin (MULTI VITAMIN DAILY PO), Take  by mouth., Disp: , Rfl:   •  PB-Hyoscy-Atropine-Scopolamine (DONNATAL) 16.2 MG per tablet, Take  by mouth., Disp: , Rfl:   •  polyethylene glycol (MIRALAX) packet, Take 17 g by mouth Daily., Disp: 100 each, Rfl: 0  •  predniSONE (DELTASONE) 1 MG tablet, Take 10 mg by mouth Daily., Disp: , Rfl:   •  senna (SENOKOT) 8.6 MG tablet tablet, Take 2 tablets by mouth Every Night., Disp: , Rfl:   •  Ubiquinol 100 MG capsule, Take  by mouth., Disp: , Rfl: